# Patient Record
Sex: FEMALE | Race: BLACK OR AFRICAN AMERICAN | NOT HISPANIC OR LATINO | Employment: OTHER | URBAN - METROPOLITAN AREA
[De-identification: names, ages, dates, MRNs, and addresses within clinical notes are randomized per-mention and may not be internally consistent; named-entity substitution may affect disease eponyms.]

---

## 2017-08-10 ENCOUNTER — GENERIC CONVERSION - ENCOUNTER (OUTPATIENT)
Dept: OBGYN CLINIC | Facility: CLINIC | Age: 62
End: 2017-08-10

## 2017-09-12 LAB
25(OH)D3 SERPL-MCNC: 52 NG/ML (ref 30–100)
BUN SERPL-MCNC: 12 MG/DL (ref 7–25)
CHOLEST SERPL-MCNC: 241 MG/DL (ref 0–199)
CREAT SERPL-MCNC: 0.99 MG/DL (ref 0.6–1.2)
EGFR (HISTORICAL): 73 ML/MIN
EST. AVERAGE GLUCOSE BLD GHB EST-MCNC: 120 MG/DL
FOLATE SERPL-MCNC: >24.8 NG/ML (ref 5.9–24.8)
GLUCOSE (HISTORICAL): 93 MG/DL (ref 70–100)
HBA1C MFR BLD HPLC: 5.8 % (ref 4.6–6.2)
HDLC SERPL-MCNC: 69 MG/DL (ref 40–60)
Lab: 158 MG/DL (ref 0–100)
NON-HDL-CHOL (CHOL-HDL) (HISTORICAL): 172 MG/DL (ref 0–129)
T4 FREE SERPL-MCNC: 0.74 NG/DL (ref 0.61–1.12)
TRIGL SERPL-MCNC: 71 MG/DL (ref 0–149)
TSH SERPL DL<=0.05 MIU/L-ACNC: 1.96 UIU/ML (ref 0.34–5.6)

## 2017-11-28 ENCOUNTER — ALLSCRIPTS OFFICE VISIT (OUTPATIENT)
Dept: OTHER | Facility: OTHER | Age: 62
End: 2017-11-28

## 2017-11-28 DIAGNOSIS — R10.9 ABDOMINAL PAIN: ICD-10-CM

## 2017-11-29 NOTE — PROGRESS NOTES
Assessment    1  Female pelvic pain (625 9) (R10 2)    Plan  Abdominal pain    · 1 - Faizan COLLIER, Radha Vazquez (Gastroenterology) Co-Management  *  Status: Active -Retrospective By Protocol Authorization  Requested for: [id number]   Ordered; Abdominal pain; Ordered By: David Ortega Performed:  Due: [id number]; Last Updated By: Adolfo Medrano; 11/28/2017 10:16:59 AM  Care Summary provided  : Yes   · * US PELVIS COMPLETE (TRANSABDOMINAL AND TRANSVAGINAL); Status:Hold For -Scheduling,Retrospective By Protocol Authorization; Requested for:28Nov2017;    Perform:[facility]; [phone]; Last Updated Mihiria Gilberto; 11/28/2017 10:17:19 AM;Ordered;pain; Tucker Passe By:Kayla Rice; Advised to see Gastroenterology if pelvic ultrasound is normal to r/o diverticular issues, pt agrees   Discussion/Summary  Goals and Barriers: The patient has the current Goals: Resolve LLQ pain  The patent has the current Barriers: Losing insurance 1/2018  Patient's Capacity to Self-Care: Patient is able to Self-Care  Medication SE Review and Pt Understands Tx: Possible side effects of new medications were reviewed with the patient/guardian today  The treatment plan was reviewed with the patient/guardian  The patient/guardian understands and agrees with the treatment plan   Self Referrals:   Self Referrals: Yes      Chief Complaint  Chief Complaint Free Text Note Form: Patient here due to pain near the left side of her vaginal area that leads to her left pelvic area sometimes  Patient states had this pain x few months  History of Present Illness  HPI: Chelsea 58year-old here for evaluation of left lower quadrant pain comes and goes  She states she had a colonoscopy 3 years ago which was normal  She denies vaginal issues  She does have history of partial hysterectomy for fibroids  She denies abnormal Pap history and is up-to-date, last Pap 2017 normal  Reports blood-tinged with BMs recently        Review of Systems  Focused-Female:  Constitutional: No fever, no chills, feels well, no tiredness, no recent weight gain or loss  Gastrointestinal: abdominal pain-- and-- bloody stools, but-- no nausea,-- no vomiting,-- no constipation-- and-- no diarrhea  Genitourinary: no complaints of dysuria, no incontinence, no pelvic pain, no dysmenorrhea, no vaginal discharge or abnormal vaginal bleeding  ROS Reviewed:   ROS reviewed  Active Problems  1  Abdominal pain (789 00) (R10 9)    Past Medical History  1  History of ovarian cyst (V13 29) (Z87 42)  Active Problems And Past Medical History Reviewed: The active problems and past medical history were reviewed and updated today  Surgical History    1  History of Hysterectomy  Surgical History Reviewed: The surgical history was reviewed and updated today  Family History  Sister    1  Family history of malignant neoplasm of breast (V16 3) (Z80 3)  Multiple Family Members    2  Family history of diabetes mellitus (V18 0) (Z83 3)   3  Family history of hypertension (V17 49) (Z82 49)  Maternal Relatives    4  Family history of diabetes mellitus (V18 0) (Z83 3)   5  Family history of hypertension (V17 49) (Z82 49)  Family History Reviewed: The family history was reviewed and updated today  Denies fam hx of gyn or colon cancers  Alber First is still a little      Social History   · Never a smoker   · No alcohol use   · Not currently sexually active  Social History Reviewed: The social history was reviewed and updated today  Current Meds   1  No Reported Medications Recorded  Medication List Reviewed: The medication list was reviewed and updated today  Allergies  1   No Known Drug Allergies    Vitals  Vital Signs    Recorded: [id number] 09:52AM   Temperature 98 1 F, Oral   Systolic 590, LUE, Sitting   Diastolic 72, LUE, Sitting   Height 5 ft 1 in   Weight 152 lb    BMI Calculated 28 72   BSA Calculated 1 68       Physical Exam   Constitutional  General appearance: No acute distress, well appearing and well nourished  Pulmonary  Respiratory effort: No increased work of breathing or signs of respiratory distress  Genitourinary  External genitalia: Normal and no lesions appreciated  Vagina: Normal, no lesions or dryness appreciated  Urethral meatus: Normal    Cervix: Normal, no palpable masses  Uterus: Surgically absent  Adnexa/parametria: Normal, non-tender and no fullness or masses appreciated     Psychiatric  Orientation to person, place, and time: Normal    Mood and affect: Normal        Signatures   Electronically signed by : ANGELA Bangura; Nov 28 2017 10:24AM EST                       (Author)    Electronically signed by : Lashay Copeland MD; Nov 28 2017 12:59PM EST

## 2017-11-30 ENCOUNTER — HOSPITAL ENCOUNTER (OUTPATIENT)
Dept: ULTRASOUND IMAGING | Facility: CLINIC | Age: 62
Discharge: HOME | End: 2017-11-30
Payer: COMMERCIAL

## 2017-11-30 DIAGNOSIS — R10.9 ABDOMINAL PAIN: ICD-10-CM

## 2017-11-30 PROCEDURE — 76830 TRANSVAGINAL US NON-OB: CPT

## 2017-11-30 PROCEDURE — 76856 US EXAM PELVIC COMPLETE: CPT

## 2017-12-04 ENCOUNTER — GENERIC CONVERSION - ENCOUNTER (OUTPATIENT)
Dept: OTHER | Facility: OTHER | Age: 62
End: 2017-12-04

## 2017-12-12 ENCOUNTER — ALLSCRIPTS OFFICE VISIT (OUTPATIENT)
Dept: OTHER | Facility: OTHER | Age: 62
End: 2017-12-12

## 2017-12-13 ENCOUNTER — TRANSCRIBE ORDERS (OUTPATIENT)
Dept: ADMINISTRATIVE | Facility: HOSPITAL | Age: 62
End: 2017-12-13

## 2017-12-13 DIAGNOSIS — R10.9 STOMACH ACHE: Primary | ICD-10-CM

## 2017-12-13 NOTE — CONSULTS
Assessment    1  Abdominal pain (789 00) (R10 9)    Plan  Abdominal pain    · Dicyclomine HCl - 20 MG Oral Tablet; TAKE 1 TABLET EVERY 6 HOURS ASNEEDED   Rx By: Erik Hayden; Dispense: 10 Days ; #:40 Tablet; Refill: 0;Abdominal pain; SHARLENE = N; [facility] [address]   · * CT ABDOMEN PELVIS W CONTRAST; Status:Need Information - Financial Authorization; Requested for:[id number];    Perform:[facility]; UZP:[id number];[place]; Mark Todd; Ordered By:Starla Gloria; Discussion/Summary  Discussion Summary:   She 68-year-old female with left lower quadrant and left groin pain which I think is from an inguinal canal strain or from spasm  She had a normal colonoscopy 3 years ago  She is no change in her bowel habits pain  The pain is severe  we'll do CAT scan of abdomen and pelvis with contrast   will give dissecting trial     Counseling Documentation With Imm: The patient was counseled regarding diagnostic results,-- prognosis,-- risks and benefits of treatment options  History of Present Illness  HPI: She is a 68-year-old female with left lower quadrant and left groin abdominal pain that radiates down the leg a little bit  She reports bowel habits that are normal she has no melena hematochezia  She had a normal colonoscopy 3 years ago  She is no fevers chills nausea vomiting  It does not appear to be movement or musculoskeletal related  She does transvaginal ultrasound and a gynecological exam that was normal  She denies NSAID use  She is no medication trials  Review of Systems  Complete-Female GI Adult:  Constitutional: No fever, no chills, feels well, no tiredness, no recent weight gain or weight loss  Eyes: No complaints of eye pain, no red eyes, no eyesight problems, no discharge, no dry eyes, no itching of eyes  ENT: no complaints of earache, no loss of hearing, no nose bleeds, no nasal discharge, no sore throat, no hoarseness    Cardiovascular: No complaints of slow heart rate, no fast heart rate, no chest pain, no palpitations, no leg claudication, no lower extremity edema  Respiratory: No complaints of shortness of breath, no wheezing, no cough, no SOB on exertion, no orthopnea, no PND  Gastrointestinal: No complaints of abdominal pain, no constipation, no nausea or vomiting, no diarrhea, no bloody stools  Genitourinary: No complaints of dysuria, no incontinence, no pelvic pain, no dysmenorrhea, no vaginal discharge or bleeding  Musculoskeletal: No complaints of arthralgias, no myalgias, no joint swelling or stiffness, no limb pain or swelling  Integumentary: No complaints of skin rash or lesions, no itching, no skin wounds, no breast pain or lump  Neurological: No complaints of headache, no confusion, no convulsions, no numbness, no dizziness or fainting, no tingling, no limb weakness, no difficulty walking  Psychiatric: Not suicidal, no sleep disturbance, no anxiety or depression, no change in personality, no emotional problems  Endocrine: No complaints of proptosis, no hot flashes, no muscle weakness, no deepening of the voice, no feelings of weakness  Hematologic/Lymphatic: No complaints of swollen glands, no swollen glands in the neck, does not bleed easily, does not bruise easily  ROS Reviewed:   ROS reviewed  Active Problems  1  Abdominal pain (789 00) (R10 9)   2  Female pelvic pain (625 9) (R10 2)    Past Medical History  1  History of ovarian cyst (V13 29) (Z87 42)  Active Problems And Past Medical History Reviewed: The active problems and past medical history were reviewed and updated today  Surgical History  1  History of Hysterectomy  Surgical History Reviewed: The surgical history was reviewed and updated today  Family History  Sister    1  Family history of malignant neoplasm of breast (V16 3) (Z80 3)  Multiple Family Members    2  Family history of diabetes mellitus (V18 0) (Z83 3)   3   Family history of hypertension (V17 49) (Z82 49)  Maternal Relatives    4  Family history of diabetes mellitus (V18 0) (Z83 3)   5  Family history of hypertension (V17 49) (Z82 49)  Family History Reviewed: The family history was reviewed and updated today  Social History   · Never a smoker   · No alcohol use   · Not currently sexually active  Social History Reviewed: The social history was reviewed and updated today  The social history was reviewed and is unchanged  Current Meds   1  No Reported Medications Recorded  Medication List Reviewed: The medication list was reviewed and updated today  Allergies  1  No Known Drug Allergies    Vitals  Vital Signs    Recorded: 85Oak4939 03:09PM   Heart Rate 90   Systolic 685   Diastolic 84   Height 5 ft 1 in   Weight 155 lb 4 oz   BMI Calculated 29 33   BSA Calculated 1 7       Physical Exam   Constitutional  General appearance: No acute distress, well appearing and well nourished  Eyes  Conjunctiva and lids: No swelling, erythema or discharge  Pupils and irises: Equal, round and reactive to light  Ears, Nose, Mouth, and Throat  External inspection of ears and nose: Normal    Otoscopic examination: Tympanic membranes translucent with normal light reflex  Canals patent without erythema  Nasal mucosa, septum, and turbinates: Normal without edema or erythema  Oropharynx: Normal with no erythema, edema, exudate or lesions  Pulmonary  Respiratory effort: No increased work of breathing or signs of respiratory distress  Auscultation of lungs: Clear to auscultation  Cardiovascular  Palpation of heart: Normal PMI, no thrills  Auscultation of heart: Normal rate and rhythm, normal S1 and S2, without murmurs  Examination of extremities for edema and/or varicosities: Normal    Carotid pulses: Normal    Abdomen  Abdomen: Abnormal  -- tender to palp in LLQ  Liver and spleen: No hepatomegaly or splenomegaly  Lymphatic  Palpation of lymph nodes in neck: No lymphadenopathy     Musculoskeletal  Gait and station: Normal    Digits and nails: Normal without clubbing or cyanosis  Inspection/palpation of joints, bones, and muscles: Normal    Skin  Skin and subcutaneous tissue: Normal without rashes or lesions     Psychiatric  Orientation to person, place, and time: Normal    Mood and affect: Normal          Signatures   Electronically signed by : Yeni Mccallum MD; Dec 12 2017  3:43PM EST                       (Author)

## 2017-12-15 ENCOUNTER — HOSPITAL ENCOUNTER (OUTPATIENT)
Dept: CT IMAGING | Facility: CLINIC | Age: 62
Discharge: HOME | End: 2017-12-15
Payer: COMMERCIAL

## 2017-12-15 ENCOUNTER — GENERIC CONVERSION - ENCOUNTER (OUTPATIENT)
Dept: OTHER | Facility: OTHER | Age: 62
End: 2017-12-15

## 2017-12-15 DIAGNOSIS — R10.9 ABDOMINAL PAIN: ICD-10-CM

## 2017-12-15 PROCEDURE — 74177 CT ABD & PELVIS W/CONTRAST: CPT

## 2017-12-15 RX ADMIN — IOHEXOL 100 ML: 350 INJECTION, SOLUTION INTRAVENOUS at 13:47

## 2018-01-13 VITALS
SYSTOLIC BLOOD PRESSURE: 128 MMHG | HEIGHT: 61 IN | BODY MASS INDEX: 28.7 KG/M2 | WEIGHT: 152 LBS | TEMPERATURE: 98.1 F | DIASTOLIC BLOOD PRESSURE: 72 MMHG

## 2018-01-23 VITALS
HEART RATE: 90 BPM | DIASTOLIC BLOOD PRESSURE: 84 MMHG | SYSTOLIC BLOOD PRESSURE: 122 MMHG | HEIGHT: 61 IN | BODY MASS INDEX: 29.31 KG/M2 | WEIGHT: 155.25 LBS

## 2018-01-23 NOTE — MISCELLANEOUS
Message   Recorded as Task   Date: 2017 07:15 AM, Created By: Charle Hamman   Task Name: Follow Up   Assigned To: Jami Apgar   Regarding Patient: Hardy Sears, Status: In Progress   Comment:    Kayla Rice - 04 Dec 2017 7:15 AM     TASK CREATED  pls notify pt her pelvic u/s showed nml ovaries so I would like her to see GI as discussed at our last visit,thx   Madison Low - 04 Dec 2017 9:28 AM     TASK IN PROGRESS   Madison Low - 04 Dec 2017 9:31 AM     TASK EDITED  Patient is aware of results  She has already made an appointment with a GI doctor  Active Problems    1  Abdominal pain (789 00) (R10 9)   2  Female pelvic pain (625 9) (R10 2)    Current Meds   1  No Reported Medications Recorded    Allergies    1   No Known Drug Allergies    Signatures   Electronically signed by : Gilford Jourdain, ; Dec  4 2017  9:31AM EST                       (Author)

## 2018-01-23 NOTE — RESULT NOTES
Verified Results  * [facility] [id number] 09:37AM Deleta Tamara Order Number: [id number]    - Patient Instructions: To schedule this appointment, please contact Central Scheduling at [phone]  Test Name Result Flag Reference   US PELVIS COMPLETE (TRANSABDOMINAL AND TRANSVAGINAL) (Report)     PELVIC ULTRASOUND, COMPLETE     INDICATION: Status post hysterectomy  Left lower quadrant pain radiating down leg  COMPARISON: None  TECHNIQUE:  Transabdominal pelvic ultrasound was performed in sagittal and transverse planes with a curvilinear transducer  Additional transvaginal imaging was performed to better evaluate the endometrium and ovaries  Imaging included volumetric    sweeps as well as traditional still imaging technique  FINDINGS:     UTERUS:   Status post hysterectomy  ENDOMETRIUM:    Status post hysterectomy  OVARIES/ADNEXA:   Right ovary: 1 6 x 2 1 x 1 4 cm  No suspicious right ovarian abnormality  Doppler flow within normal limits  Left ovary: 2 1 x 1 0 x 1 4 cm  No suspicious left ovarian abnormality  Doppler flow within normal limits  No suspicious adnexal mass or loculated collections  There is no free fluid  IMPRESSION:        1  Status post hysterectomy  2  Normal ovaries            Workstation performed: [id number]     Signed by:   Kina Sears MD   12/1/17

## 2018-01-23 NOTE — RESULT NOTES
Verified Results  * CT ABDOMEN PELVIS W CONTRAST [id number] 01:12PM Romina Buchanancorrine Order Number: [id number]    - Patient Instructions: To schedule this appointment, please contact Central Scheduling at [phone]  Test Name Result Flag Reference   CT ABDOMEN PELVIS W CONTRAST (Report)     CT ABDOMEN AND PELVIS WITH IV CONTRAST     INDICATION: Left lower abdominal pain and groin pain  Left-sided vaginal pain  COMPARISON: None  TECHNIQUE: CT examination of the abdomen and pelvis was performed  Reformatted images were created in axial, sagittal, and coronal planes  Radiation dose length product (DLP) for this visit: 661 8 mGy-cm   This examination, like all CT scans performed in the [facility], was performed utilizing techniques to minimize radiation dose exposure, including the use of iterative    reconstruction and automated exposure control  IV Contrast: 100 mL of iohexol (OMNIPAQUE)        Enteric Contrast: Enteric contrast was not administered  FINDINGS:     ABDOMEN     LOWER CHEST: No significant abnormalities identified in the lower chest      LIVER/BILIARY TREE: Unremarkable  GALLBLADDER: No calcified gallstones  No pericholecystic inflammatory change  SPLEEN: Unremarkable  PANCREAS: Unremarkable  ADRENAL GLANDS: Unremarkable  KIDNEYS/URETERS: Unremarkable  No hydronephrosis  STOMACH AND BOWEL: Unremarkable  APPENDIX: No findings to suggest appendicitis  ABDOMINOPELVIC CAVITY: Trace simple pelvic free fluid identified  VESSELS: Unremarkable for patient's age  PELVIS     REPRODUCTIVE ORGANS: Unremarkable for patient's age  URINARY BLADDER: Unremarkable  ABDOMINAL WALL/INGUINAL REGIONS: Unremarkable  OSSEOUS STRUCTURES: No acute fracture or destructive osseous lesion  IMPRESSION:     No acute intra-abdominal abnormality   No free air, free fluid, mesenteric inflammatory process, or bowel wall thickening         Workstation performed: [id number]     Signed by:   Mariela Lee DO   12/15/17

## 2018-02-15 ENCOUNTER — OFFICE VISIT (OUTPATIENT)
Dept: INTERNAL MEDICINE CLINIC | Facility: CLINIC | Age: 63
End: 2018-02-15
Payer: COMMERCIAL

## 2018-02-15 VITALS
WEIGHT: 151 LBS | TEMPERATURE: 97.5 F | HEIGHT: 61 IN | HEART RATE: 99 BPM | OXYGEN SATURATION: 99 % | BODY MASS INDEX: 28.51 KG/M2 | DIASTOLIC BLOOD PRESSURE: 70 MMHG | SYSTOLIC BLOOD PRESSURE: 114 MMHG

## 2018-02-15 DIAGNOSIS — J20.8 VIRAL BRONCHITIS: Primary | ICD-10-CM

## 2018-02-15 DIAGNOSIS — Z71.82 EXERCISE COUNSELING: ICD-10-CM

## 2018-02-15 DIAGNOSIS — E66.3 OVERWEIGHT (BMI 25.0-29.9): Chronic | ICD-10-CM

## 2018-02-15 DIAGNOSIS — R73.03 PREDIABETES: Chronic | ICD-10-CM

## 2018-02-15 DIAGNOSIS — Z71.3 DIETARY COUNSELING AND SURVEILLANCE: ICD-10-CM

## 2018-02-15 DIAGNOSIS — E78.00 HYPERCHOLESTEROLEMIA: Chronic | ICD-10-CM

## 2018-02-15 PROBLEM — R87.810 CERVICAL HIGH RISK HPV (HUMAN PAPILLOMAVIRUS) TEST POSITIVE: Status: ACTIVE | Noted: 2017-08-10

## 2018-02-15 PROCEDURE — 99204 OFFICE O/P NEW MOD 45 MIN: CPT | Performed by: INTERNAL MEDICINE

## 2018-02-15 RX ORDER — PROMETHAZINE HYDROCHLORIDE AND CODEINE PHOSPHATE 6.25; 1 MG/5ML; MG/5ML
5 SYRUP ORAL EVERY 4 HOURS PRN
Qty: 473 ML | Refills: 1 | Status: SHIPPED | OUTPATIENT
Start: 2018-02-15 | End: 2018-09-13 | Stop reason: CLARIF

## 2018-02-15 NOTE — PROGRESS NOTES
INTERNAL MEDICINE INITIAL OFFICE VISIT  [facility] [address] [place] [address]    NAME: Jeffery Dorsey  AGE: 61 y o  SEX: female  : 1955     DATE: 2/15/2018    Assessment and Plan     1  Viral bronchitis    Patient's history and physical consistent with viral bronchitis  Discussed supportive treatment options  Cough medicine prescription was given to her today  Rest/fluids  Discussed signs/symptoms that would warrant re-evaluation  - promethazine-codeine (PHENERGAN WITH CODEINE) 6 25-10 mg/5 mL syrup; Take 5 mL by mouth every 4 (four) hours as needed for cough  Dispense: 473 mL; Refill: 1    2  Hypercholesterolemia    Discussed diet/exercise with patient  Would like to repeat labs in 4 months  If no improvement in her cholesterol, will likely recommend statin  - Lipid Panel with Direct LDL reflex; Future  - Basic metabolic panel; Future    3  Prediabetes    Discussed diet and exercise with patient  She was recommend to decrease cholesterol and carbohydrate intake  Will repeat A1C before next appointment  - Hemoglobin A1c; Future    4  Overweight (BMI 25 0-29  9)    Dietary and exercise counseling was given to the patient  BMI goal <25  She will work on making changes in her lifestyle  Barriers to treatment: no insurance    Chief Complaint     Chief Complaint   Patient presents with    Cough    Nasal Congestion    Establish Care     History of Present Illness     61 y o   female presents to establish care  She was last receiving [facility] at [facility]  Labs from 2017 were reviewed  Patient has a history of overweight, hypercholesterolemia, and pre-diabetes (A1C 5 8%)  She admits to excess carbohydrate intake and poor exercise  The reason she presents today is due to illness  She started getting sick on Saturday  Her symptoms overall are mild, but her cough is moderate she states   She has been experiencing harsh dry cough, nasal congestion, rhinorrhea, and feeling hot/cold  She has not taken her temperature at home  She is feeling a little better today, but she still has a nasty cough  She denies SOB, wheezing, tobacco use, or history of lung disease  She denies any history of heart disease  She denies any recent sick contacts  She denies difficulty lying flat  She is UTD with mammo, pap smear, and colonoscopy  She has colonoscopy report at home that she will bring in  She believes she had it in 2014  She does not get the flu shot  She is not depressed  The following portions of the patient's history were reviewed and updated as appropriate: allergies, current medications, past family history, past medical history, past social history, past surgical history and problem list     Review of Systems   Review of Systems   Constitutional: Negative for appetite change, chills, fatigue and fever  HENT: Positive for congestion and rhinorrhea  Negative for hearing loss, postnasal drip, sinus pain, sore throat, tinnitus and trouble swallowing  Eyes: Negative for pain, discharge, redness and visual disturbance  Respiratory: Positive for cough (Dry)  Negative for chest tightness, shortness of breath and wheezing  Cardiovascular: Negative for chest pain, palpitations and leg swelling  Gastrointestinal: Negative for abdominal distention, abdominal pain, blood in stool, constipation, diarrhea, nausea and vomiting  Endocrine: Negative for cold intolerance, heat intolerance, polydipsia, polyphagia and polyuria  Genitourinary: Negative for difficulty urinating, dysuria, frequency, hematuria and urgency  Musculoskeletal: Negative for arthralgias, back pain, gait problem, joint swelling, myalgias, neck pain and neck stiffness  Skin: Negative for color change and rash  Neurological: Negative for dizziness, tremors, seizures, syncope, speech difficulty, weakness, light-headedness, numbness and headaches     Hematological: Negative for adenopathy  Does not bruise/bleed easily  Psychiatric/Behavioral: Negative for agitation, behavioral problems, confusion, hallucinations, sleep disturbance and suicidal ideas  The patient is not nervous/anxious  Past Medical History     Past Medical History:   Diagnosis Date    Postmenopausal atrophic vaginitis     Uterine prolapse      Past Surgical History     Past Surgical History:   Procedure Laterality Date    COLONOSCOPY  2014    TOTAL ABDOMINAL HYSTERECTOMY      supracervical     Social History   She has no tobacco, alcohol, and drug history on file  Family History   No family history on file  Current Medications     Current Outpatient Prescriptions:     dicyclomine (BENTYL) 20 mg tablet, Take 20 mg by mouth every 6 (six) hours as needed, Disp: , Rfl: 0    Allergies   Allergies not on file    Objective   There were no vitals taken for this visit  Physical Exam   Constitutional: She is oriented to person, place, and time  She appears well-developed and well-nourished  No distress  HENT:   Head: Normocephalic and atraumatic  Nose: Mucosal edema and rhinorrhea present  Right sinus exhibits no maxillary sinus tenderness and no frontal sinus tenderness  Left sinus exhibits no maxillary sinus tenderness and no frontal sinus tenderness  Eyes: Conjunctivae and EOM are normal  Pupils are equal, round, and reactive to light  Right eye exhibits no discharge  Left eye exhibits no discharge  No scleral icterus  Neck: Normal range of motion  Neck supple  No JVD present  No tracheal deviation present  No thyromegaly present  Cardiovascular: Normal rate, regular rhythm, normal heart sounds and intact distal pulses  Exam reveals no gallop and no friction rub  No murmur heard  Pulmonary/Chest: Effort normal and breath sounds normal  No stridor  No respiratory distress  She has no wheezes  She has no rales  She exhibits no tenderness  Abdominal: Soft   Bowel sounds are normal  She exhibits no distension and no mass  There is no tenderness  There is no rebound and no guarding  Musculoskeletal: Normal range of motion  She exhibits no edema, tenderness or deformity  Lymphadenopathy:     She has no cervical adenopathy  Neurological: She is alert and oriented to person, place, and time  No cranial nerve deficit  She exhibits normal muscle tone  Coordination normal    Skin: Skin is warm and dry  No rash noted  She is not diaphoretic  No erythema  No pallor  Psychiatric: She has a normal mood and affect  Her behavior is normal    Vitals reviewed  Laboratory Results: I have personally reviewed the pertinent laboratory results/reports      Chemistry Profile:   Results from last 6 Months  Lab Units 09/12/17  1127   BUN mg/dL 12   CREATININE mg/dL 0 99     Endocrine Studies:   Results from last 6 Months  Lab Units 09/12/17  1127   HEMOGLOBIN A1C % 5 8   TSH 3RD GENERATON uIU/mL 1 955   FREE T4 ng/dL 0 74   TRIGLYCERIDES mg/dL 71   CHOLESTEROL mg/dL 241*   HDL mg/dL 69*   VIT D 25 HYDROXY ng/mL 52     Radiology/Other Diagnostic Testing Results: I have personally reviewed pertinent reports        Health Maintenance     Health Maintenance   Topic Date Due    HIV SCREENING  1955    Hepatitis C Screening  1955    COLONOSCOPY  1955    DTaP,Tdap,and Td Vaccines (1 - Tdap) 02/10/1962    Depression Screening PHQ-9  02/15/2019    MAMMOGRAM  08/10/2019    PAP SMEAR  07/25/2020    INFLUENZA VACCINE  Addressed (Patient declines/refuses)     Valentino Mcguire DO  [facility] [address] [place] [address]

## 2018-02-15 NOTE — PATIENT INSTRUCTIONS
Acute Bronchitis   AMBULATORY CARE:   Acute bronchitis  is swelling and irritation in the air passages of your lungs  This irritation may cause you to cough or have other breathing problems  Acute bronchitis often starts because of another illness, such as a cold or the flu  The illness spreads from your nose and throat to your windpipe and airways  Bronchitis is often called a chest cold  Acute bronchitis lasts about 3 to 6 weeks and is usually not a serious illness  Your cough can last for several weeks  You may have any of the following symptoms:   · A cough with sputum that may be clear, yellow, or green    · Feeling more tired than usual, and body aches    · A fever and chills    · Wheezing when you breathe    · A tight chest or pain when you breathe or cough  Seek care immediately if:   · You cough up blood  · Your lips or fingernails turn blue  · You feel like you are not getting enough air when you breathe  Contact your healthcare provider if:   · You have a fever  · Your breathing problems do not go away or get worse  · Your cough does not get better within 4 weeks  · You have questions or concerns about your condition or care  Self-care:   · Get more rest   Rest helps your body to heal  Slowly start to do more each day  Rest when you feel it is needed  · Avoid irritants in the air  Avoid chemicals, fumes, and dust  Wear a face mask if you must work around dust or fumes  Stay inside on days when air pollution levels are high  If you have allergies, stay inside when pollen counts are high  Do not use aerosol products, such as spray-on deodorant, bug spray, and hair spray  · Do not smoke or be around others who smoke  Nicotine and other chemicals in cigarettes and cigars damages the cilia that move mucus out of your lungs  Ask your healthcare provider for information if you currently smoke and need help to quit  E-cigarettes or smokeless tobacco still contain nicotine   Talk to your healthcare provider before you use these products  · Drink liquids as directed  Liquids help keep your air passages moist and help you cough up mucus  You may need to drink more liquids when you have acute bronchitis  Ask how much liquid to drink each day and which liquids are best for you  · Use a humidifier or vaporizer  Use a cool mist humidifier or a vaporizer to increase air moisture in your home  This may make it easier for you to breathe and help decrease your cough  Prevent acute bronchitis by doing the following:   · Get the vaccinations you need  Ask your healthcare provider if you should get vaccinated against the flu or pneumonia  · Prevent the spread of germs  You can decrease your risk of acute bronchitis and other illnesses by doing the following:     [facility] [place] hands often with soap and water  Carry germ-killing hand lotion or gel with you  You can use the lotion or gel to clean your hands when soap and water are not available  ¨ Do not touch your eyes, nose, or mouth unless you have washed your hands first     ¨ Always cover your mouth when you cough to prevent the spread of germs  It is best to cough into a tissue or your shirt sleeve instead of into your hand  Ask those around you cover their mouths when they cough  ¨ Try to avoid people who have a cold or the flu  If you are sick, stay away from others as much as possible  Medicines: Your healthcare provider may  give you any of the following:  · Ibuprofen or acetaminophen  are medicines that help lower your fever  They are available without a doctor's order  Ask your healthcare provider which medicine is right for you  Ask how much to take and how often to take it  Follow directions  These medicines can cause stomach bleeding if not taken correctly  Ibuprofen can cause kidney damage  Do not take ibuprofen if you have kidney disease, an ulcer, or allergies to aspirin  Acetaminophen can cause liver damage   Do not take more than 4,000 milligrams in 24 hours  · Decongestants  help loosen mucus in your lungs and make it easier to cough up  This can help you breathe easier  · Cough suppressants  decrease your urge to cough  If your cough produces mucus, do not take a cough suppressant unless your healthcare provider tells you to  Your healthcare provider may suggest that you take a cough suppressant at night so you can rest     · Inhalers  may be given  Your healthcare provider may give you one or more inhalers to help you breathe easier and cough less  An inhaler gives your medicine to open your airways  Ask your healthcare provider to show you how to use your inhaler correctly  Follow up with your healthcare provider as directed:  Write down questions you have so you will remember to ask them during your follow-up visits  © 2017 [employer] [employer]  Information is for End User's use only and may not be sold, redistributed or otherwise used for commercial purposes  All illustrations and images included in CareNotes® are the copyrighted property of [employer] M [employer]  or Clayton Kim  The above information is an  only  It is not intended as medical advice for individual conditions or treatments  Talk to your doctor, nurse or pharmacist before following any medical regimen to see if it is safe and effective for you  Hemoglobin A1c   WHAT YOU NEED TO KNOW:   What is a hemoglobin A1c test, and why do I need one? A hemoglobin A1c is a blood test that measures your average blood sugar level for the past 2 to 3 months  It is also called an HbA1c or glycohemoglobin test  An A1c test can help diagnose prediabetes or diabetes  It can also tell you how well your diabetes plan is working  A1c testing can help your healthcare provider make changes to your treatment plan  These changes can help improve or control your blood sugar levels   Good control of your blood sugar levels can decrease your risk for problems caused by diabetes  Examples include heart attack, stroke, blindness, kidney disease, and neuropathy (nerve problems)  What increases my risk for diabetes? You may need an A1c test if you have any of the following risk factors for diabetes:  · Heart disease    · High blood pressure    · Polycystic ovarian syndrome    · A first-degree relative with diabetes, such as a parent, brother, sister, or child    · Being overweight    · Lack of exercise or activity     · History of gestational diabetes and poor nutrition while pregnant  Who should get an A1c test?   · Adults who are overweight and have 1 or more risk factors for diabetes    · Adults 39years of age or older    · Children 7 to 25 years who are overweight and have 2 or more risk factors for diabetes    · Anyone with signs or symptoms of diabetes, such as increased thirst, slow-healing infections, or increased urination  What do the results of an A1c test mean? The results are given in percentages  · An A1c of 5 6% or lower means you do not have diabetes  · An A1c of 5 7% to 6 4% means you are at risk for diabetes  This is also called prediabetes  · An A1c of 6 5% or higher means you have diabetes  · If you currently have diabetes in good control, your A1c goal may be 7% or lower  Your healthcare provider will decide what your goal should be  This decision is based on your diabetes history and other medical conditions  You may need an A1c test 2 to 4 times each year, depending on your blood sugar level control  How should I prepare for the test?  You do not need to do anything to prepare for the test  Wear a short-sleeved or loose shirt to the test  This will make it easier to draw your blood  Other tests may be needed to diagnose or monitor diabetes if you have certain conditions   Tell your healthcare provider if you have any of the following:  · Iron-deficiency or [id number] anemia    · Cystic fibrosis    · Recent heavy blood loss or a blood transfusion    · Recent erythropoietin therapy    · Sickle cell disease or thalassemia    · Kidney failure or liver disease  What will happen after the test?  Schedule a follow-up appointment with your healthcare provider to talk about your test results  · If your A1c is lower than your goal , your medicines may be changed  · If your A1c is at your goal , you may not need any changes to your diabetes treatment plan  · If your A1c is higher than your goal , you may need changes to your medicines, eating plan, or exercise plan  What else should I know about an A1c test?   · You may get an estimated Average Glucose (eAG) with your A1c results  The eAG gives your A1c result in numbers like you see on your glucose meter  For example, an A1c of 6% will be reported as an eAG of 126 mg/dL  This means your average blood sugar level was 126 mg/dL over the last 2 to 3 months  The eAG can help you understand if your A1c result is on target for what your healthcare provider recommends  · You are at risk for an uncommon type of hemoglobin if you are , Mediterranean, or [facility]  This type of hemoglobin can affect your A1c test results  Tell your healthcare provider if you come from any of these backgrounds  You may need to have your A1c sent to a lab with certain equipment  This will help make sure your results are accurate  CARE AGREEMENT:   You have the right to help plan your care  To help with this plan, you must learn about your lab tests  You can then discuss the results with your caregivers  Work with them to decide what care may be used to treat you  You always have the right to refuse treatment  The above information is an  only  It is not intended as medical advice for individual conditions or treatments  Talk to your doctor, nurse or pharmacist before following any medical regimen to see if it is safe and effective for you    © 2017 [employer]  Information is for End User's use only and may not be sold, redistributed or otherwise used for commercial purposes  All illustrations and images included in CareNotes® are the copyrighted property of A D A M , Inc  or Clayton Kim

## 2018-02-26 ENCOUNTER — TELEPHONE (OUTPATIENT)
Dept: INTERNAL MEDICINE CLINIC | Facility: CLINIC | Age: 63
End: 2018-02-26

## 2018-02-26 DIAGNOSIS — R05.3 PERSISTENT COUGH: Primary | ICD-10-CM

## 2018-02-26 NOTE — TELEPHONE ENCOUNTER
Patient is request a chest xray, has a bad cough for weeks needs to get it done by Wednesday   Uvaldo Wynn  Please call when ready for

## 2018-03-01 ENCOUNTER — APPOINTMENT (OUTPATIENT)
Dept: RADIOLOGY | Facility: CLINIC | Age: 63
End: 2018-03-01
Payer: COMMERCIAL

## 2018-03-01 ENCOUNTER — TELEPHONE (OUTPATIENT)
Dept: INTERNAL MEDICINE CLINIC | Facility: CLINIC | Age: 63
End: 2018-03-01

## 2018-03-01 DIAGNOSIS — R05.3 PERSISTENT COUGH: ICD-10-CM

## 2018-03-01 PROCEDURE — 71046 X-RAY EXAM CHEST 2 VIEWS: CPT

## 2018-03-02 ENCOUNTER — TRANSCRIBE ORDERS (OUTPATIENT)
Dept: ADMINISTRATIVE | Facility: HOSPITAL | Age: 63
End: 2018-03-02

## 2018-03-02 ENCOUNTER — TELEPHONE (OUTPATIENT)
Dept: INTERNAL MEDICINE CLINIC | Facility: CLINIC | Age: 63
End: 2018-03-02

## 2018-03-02 DIAGNOSIS — Z12.39 SCREENING BREAST EXAMINATION: Primary | ICD-10-CM

## 2018-03-02 DIAGNOSIS — Z12.39 SCREENING FOR BREAST CANCER: Primary | ICD-10-CM

## 2018-03-02 NOTE — TELEPHONE ENCOUNTER
----- Message from Kenny Thomas DO sent at 3/1/2018  6:52 PM EST -----  Let patient know there was no evidence of pneumonia on chest x-ray

## 2018-03-05 ENCOUNTER — OFFICE VISIT (OUTPATIENT)
Dept: OBGYN CLINIC | Facility: CLINIC | Age: 63
End: 2018-03-05
Payer: COMMERCIAL

## 2018-03-05 VITALS
HEIGHT: 61 IN | SYSTOLIC BLOOD PRESSURE: 110 MMHG | WEIGHT: 153 LBS | BODY MASS INDEX: 28.89 KG/M2 | DIASTOLIC BLOOD PRESSURE: 62 MMHG

## 2018-03-05 DIAGNOSIS — R87.810 CERVICAL HIGH RISK HPV (HUMAN PAPILLOMAVIRUS) TEST POSITIVE: ICD-10-CM

## 2018-03-05 DIAGNOSIS — Z01.411 ENCOUNTER FOR GYNECOLOGICAL EXAMINATION (GENERAL) (ROUTINE) WITH ABNORMAL FINDINGS: Primary | ICD-10-CM

## 2018-03-05 PROCEDURE — G0124 SCREEN C/V THIN LAYER BY MD: HCPCS | Performed by: PATHOLOGY

## 2018-03-05 PROCEDURE — 99396 PREV VISIT EST AGE 40-64: CPT | Performed by: NURSE PRACTITIONER

## 2018-03-05 PROCEDURE — G0145 SCR C/V CYTO,THINLAYER,RESCR: HCPCS | Performed by: PATHOLOGY

## 2018-03-05 PROCEDURE — 87624 HPV HI-RISK TYP POOLED RSLT: CPT | Performed by: NURSE PRACTITIONER

## 2018-03-05 NOTE — PROGRESS NOTES
Assessment/Plan:    No problem-specific Assessment & Plan notes found for this encounter  Diagnoses and all orders for this visit:    Encounter for gynecological examination (general) (routine) with abnormal findings    Cervical high risk HPV (human papillomavirus) test positive        Calcium/vit d supplementation, call with any bleeding or spotting  Subjective:      Patient ID: Theodoro Fothergill is a 61 y o  female  Pleasant 61 y o  postmenopausal female here for annual exam  She denies postmenopausal bleeding  Hx of Supracervical Hysterectomy  Denies history of abnormal pap smears  Last pap nml but HPV + non 16/18  Denies vaginal issues  Denies any further pelvic pain-pelvic u/s 7/2017 showed nml ovaries  Denies menopausal/postmenopausal issues  Gynecologic Exam   Pertinent negatives include no chills, constipation, diarrhea, dysuria, fever or hematuria  The following portions of the patient's history were reviewed and updated as appropriate:   She  has a past medical history of Postmenopausal atrophic vaginitis and Uterine prolapse  She   Patient Active Problem List    Diagnosis Date Noted    Overweight (BMI 25 0-29 9) 02/15/2018    Hypercholesterolemia 02/15/2018    Prediabetes 02/15/2018    Cervical high risk HPV (human papillomavirus) test positive 08/10/2017     She  has a past surgical history that includes Colonoscopy (2014) and Partial hysterectomy  Her family history includes Breast cancer in her sister; No Known Problems in her father and mother  She  reports that she has never smoked  She has never used smokeless tobacco  She reports that she does not drink alcohol or use drugs  Current Outpatient Prescriptions   Medication Sig Dispense Refill    promethazine-codeine (PHENERGAN WITH CODEINE) 6 25-10 mg/5 mL syrup Take 5 mL by mouth every 4 (four) hours as needed for cough 473 mL 1     No current facility-administered medications for this visit        Current Outpatient Prescriptions on File Prior to Visit   Medication Sig    promethazine-codeine (PHENERGAN WITH CODEINE) 6 25-10 mg/5 mL syrup Take 5 mL by mouth every 4 (four) hours as needed for cough     No current facility-administered medications on file prior to visit  She has No Known Allergies       Review of Systems   Constitutional: Negative for chills, fatigue, fever and unexpected weight change  Respiratory: Negative for shortness of breath  Gastrointestinal: Negative for anal bleeding, blood in stool, constipation and diarrhea  Genitourinary: Negative for difficulty urinating, dysuria and hematuria  Objective:      /62 (BP Location: Right arm, Patient Position: Sitting)   Ht 5' 1" (1 549 m)   Wt 69 4 kg (153 lb)   Breastfeeding? No   BMI 28 91 kg/m²          Physical Exam   Constitutional: She appears well-developed and well-nourished  No distress  HENT:   Head: Normocephalic  Neck: Normal range of motion  Neck supple  Pulmonary/Chest: Effort normal  Right breast exhibits no inverted nipple, no mass, no nipple discharge, no skin change and no tenderness  Left breast exhibits no inverted nipple, no mass, no nipple discharge, no skin change and no tenderness  Breasts are symmetrical    Abdominal: Soft  Genitourinary: No breast swelling, tenderness, discharge or bleeding  Pelvic exam was performed with patient supine  No labial fusion  There is no rash, tenderness, lesion or injury on the right labia  There is no rash, tenderness, lesion or injury on the left labia  No erythema or tenderness in the vagina  No foreign body in the vagina  No signs of injury around the vagina  No vaginal discharge found  Lymphadenopathy:        Right: No inguinal adenopathy present  Left: No inguinal adenopathy present

## 2018-03-05 NOTE — PATIENT INSTRUCTIONS
Menopause   WHAT YOU NEED TO KNOW:   Menopause is a normal stage in a woman's life when her monthly periods stop  Menopause starts when the ovaries slowly stop making the female hormones estrogen and progesterone  A woman who has not had a period for a full year after the age of 39 is considered to be in menopause  Perimenopause is a stage before menopause that may cause signs and symptoms similar to menopause  Perimenopause can last an average of 4 to 5 years  DISCHARGE INSTRUCTIONS:   Follow up with your healthcare provider as directed:  Write down your questions so you remember to ask them during your visits  Self-care:   · Manage hot flashes  Hot flashes are brief periods of feeling very warm, flushed, and sweaty  Hot flashes can last from a few seconds to several minutes  They may happen many times during the day, and are common at night  Layer your clothing so that you can easily remove some clothing and cool yourself during a hot flash  Cold drinks may also be helpful  · Reduce vaginal dryness  by using over-the-counter vaginal creams  Vaginal dryness may cause you to have pain or discomfort during sex  Only use creams that are made for vaginal use  Do  not  use petroleum jelly  You may need an estrogen cream to put in and around your vagina  Estrogen cream may help decrease vaginal dryness and lower your risk of vaginal infections  · Continue to use birth control  during perimenopause if you do not want to get pregnant  You may need to use birth control until it has been 1 year since your periods stopped  Ask your healthcare provider when you can stop using birth control to prevent pregnancy  Lead a healthy lifestyle:  After menopause, your risk for heart disease and bone loss increases  Ask about these and other ways to stay healthy:  · Exercise regularly  Exercise helps you maintain a healthy weight  Exercise can also help to control your blood pressure and cholesterol levels   Include weight-bearing exercise for strong bones  Ask your healthcare provider about the best exercise plan for you  · Eat a variety of healthy foods  Include fruits, vegetables, whole grains (whole-wheat bread, pasta, and cereals), low-fat dairy, and lean protein foods (beans, poultry, and fish)  Limit foods high in sodium (salt)  Ask your healthcare provider for more information about a meal plan that is right for you  · Maintain a healthy weight  Check with your healthcare provider before you start any weight loss program      · Take supplements as directed  You may need extra calcium and vitamin D to help prevent osteoporosis  · Limit alcohol and caffeine  Alcohol and caffeine may worsen your symptoms  · Do not smoke  If you smoke, it is never too late to quit  You are more likely to have a heart attack, lung disease, blood clots, and cancer if you smoke  Ask your healthcare provider for information if you need help quitting  Contact your healthcare provider if:   · You have vaginal bleeding after menopause  · You have questions or concerns about your condition or care  © 2017 [employer] [employer] Information is for End User's use only and may not be sold, redistributed or otherwise used for commercial purposes  All illustrations and images included in CareNotes® are the copyrighted property of [employer]  or [employer] Kim  The above information is an  only  It is not intended as medical advice for individual conditions or treatments  Talk to your doctor, nurse or pharmacist before following any medical regimen to see if it is safe and effective for you

## 2018-03-07 LAB — HPV RRNA GENITAL QL NAA+PROBE: ABNORMAL

## 2018-03-08 ENCOUNTER — HOSPITAL ENCOUNTER (OUTPATIENT)
Dept: MAMMOGRAPHY | Facility: CLINIC | Age: 63
Discharge: HOME | End: 2018-03-08
Payer: COMMERCIAL

## 2018-03-08 DIAGNOSIS — Z12.39 SCREENING FOR BREAST CANCER: ICD-10-CM

## 2018-03-08 DIAGNOSIS — Z12.39 SCREENING BREAST EXAMINATION: ICD-10-CM

## 2018-03-08 PROCEDURE — 77067 SCR MAMMO BI INCL CAD: CPT

## 2018-03-08 PROCEDURE — 77063 BREAST TOMOSYNTHESIS BI: CPT

## 2018-03-13 LAB
LAB AP GYN PRIMARY INTERPRETATION: NORMAL
Lab: NORMAL
PATH INTERP SPEC-IMP: NORMAL

## 2018-03-14 ENCOUNTER — OFFICE VISIT (OUTPATIENT)
Dept: INTERNAL MEDICINE CLINIC | Facility: CLINIC | Age: 63
End: 2018-03-14
Payer: COMMERCIAL

## 2018-03-14 ENCOUNTER — TELEPHONE (OUTPATIENT)
Dept: OBGYN CLINIC | Facility: CLINIC | Age: 63
End: 2018-03-14

## 2018-03-14 VITALS
HEART RATE: 100 BPM | HEIGHT: 63 IN | OXYGEN SATURATION: 98 % | SYSTOLIC BLOOD PRESSURE: 126 MMHG | BODY MASS INDEX: 27 KG/M2 | WEIGHT: 152.4 LBS | DIASTOLIC BLOOD PRESSURE: 86 MMHG

## 2018-03-14 DIAGNOSIS — R25.2 MUSCLE CRAMPS: ICD-10-CM

## 2018-03-14 DIAGNOSIS — M25.50 ARTHRALGIA, UNSPECIFIED JOINT: ICD-10-CM

## 2018-03-14 DIAGNOSIS — E78.00 HYPERCHOLESTEROLEMIA: Primary | Chronic | ICD-10-CM

## 2018-03-14 DIAGNOSIS — R73.03 PREDIABETES: Chronic | ICD-10-CM

## 2018-03-14 DIAGNOSIS — Z71.3 DIETARY COUNSELING AND SURVEILLANCE: ICD-10-CM

## 2018-03-14 PROCEDURE — 99214 OFFICE O/P EST MOD 30 MIN: CPT | Performed by: INTERNAL MEDICINE

## 2018-03-14 NOTE — PATIENT INSTRUCTIONS
1  Get lab testing as requested  Will call with results  2  Drink 6-8 glasses of water per day  3   OTC magnesium supplementation  4   Daily exercise

## 2018-03-14 NOTE — TELEPHONE ENCOUNTER
----- Message from Soha Wilburn, [address] sent at 3/14/2018  7:00 AM EDT -----  Please arrange for colposcopy, pap was ascus HPV +  Thanks

## 2018-03-14 NOTE — PROGRESS NOTES
INTERNAL MEDICINE FOLLOW-UP OFFICE VISIT  [facility] [address] [place] [address]    NAME: Miguel Antonio  AGE: 61 y o  SEX: female  : 1955       DATE: 3/14/2018    Assessment and Plan     Assessment  1  Hypercholesterolemia  2  Prediabetes  3  Muscle cramps  4  Arthralgia, unspecified joint    Plan    Get lab testing as requested  Discussed diet and exercise  Discussed daily stretching  Discussed medications that are beneficial with arthritis pain  Drink plenty of water per day  Stay away from excess carbohydrates and trans fat  Increase intake of lean protein  Exercise at least 150 minutes/week  Fruits/vegetables advised  Will call with lab results    Orders Placed This Encounter   Procedures    Lipid Panel with Direct LDL reflex    HEMOGLOBIN A1C W/ EAG ESTIMATION    Magnesium    CK (with reflex to MB)    Comprehensive metabolic panel    Sedimentation rate, automated     Chief Complaint     Chief Complaint   Patient presents with    Follow-up     questions/concerns     History of Present Illness     61 y o   female presents for follow-up  Labs from 2017 were reviewed  Patient has a history of overweight, hypercholesterolemia, and pre-diabetes (A1C 5 8%)  She is going to be losing her insurance  Wanted to know if she could get labs done today  She also has been having a lot of cramping in her arms and legs  She is unemployed  She gets aches and pains in hands, elbows, and knees  No fam history of autoimmune disease  No joint swelling  No fever or chills  The following portions of the patient's history were reviewed and updated as appropriate: allergies, current medications, past family history, past medical history, past social history, past surgical history and problem list     Review of Systems     Review of Systems   Constitutional: Negative for activity change, appetite change and fatigue     Respiratory: Negative for apnea, cough, chest tightness, shortness of breath and wheezing  Cardiovascular: Negative for chest pain, palpitations and leg swelling  Gastrointestinal: Negative for abdominal distention, abdominal pain, blood in stool, constipation, diarrhea, nausea and vomiting  Musculoskeletal: Positive for arthralgias and myalgias  Negative for back pain, gait problem and joint swelling  Skin: Negative for rash and wound  Psychiatric/Behavioral: Negative for sleep disturbance and suicidal ideas  Active Problem List     Patient Active Problem List   Diagnosis    Cervical high risk HPV (human papillomavirus) test positive    Overweight (BMI 25 0-29  9)    Hypercholesterolemia    Prediabetes     Objective     /86 (BP Location: Left arm, Patient Position: Sitting, Cuff Size: Standard)   Pulse 100   Ht 5' 3" (1 6 m)   Wt 69 1 kg (152 lb 6 4 oz) Comment: w/shoe  SpO2 98%   BMI 27 00 kg/m²     Physical Exam  GENERAL: Appears well-developed and well-nourished  Appears in no acute distress   CARDIOVASCULAR: S1 and S2 are present  Regular rate and rhythm  No murmurs, rubs, or gallops  RESPIRATORY: CTA B/L, no rales, rhonci or wheezes  Normal respiratory expansion  ABDOMINAL: Bowel sounds present in all 4 quadrants, non-tender, soft, non-distended  No organomegaly, rebound, or guarding  EXTREMITIES: 2+ DP and PT pulses bilaterally; no cyanosis, clubbing, edema  ROM intact  QUEVEDO x4   MUSCULOSKELETAL: No joint tenderness, deformity or swelling, full range of motion without pain     PSYCHIATRIC: Normal mood and affect     Pertinent Laboratory/Diagnostic Studies:    Laboratory Results: I have personally reviewed the pertinent laboratory results/reports     Current Medications       Current Outpatient Prescriptions:     promethazine-codeine (PHENERGAN WITH CODEINE) 6 25-10 mg/5 mL syrup, Take 5 mL by mouth every 4 (four) hours as needed for cough, Disp: 473 mL, Rfl: 1    Health Maintenance     Health Maintenance   Topic Date Due    HIV SCREENING  1955    Hepatitis C Screening  1955    COLONOSCOPY  1955    DTaP,Tdap,and Td Vaccines (1 - Tdap) 02/10/1976    Depression Screening PHQ-9  02/15/2019    MAMMOGRAM  08/10/2019    PAP SMEAR  03/05/2021    INFLUENZA VACCINE  Addressed       There is no immunization history on file for this patient      Avel Temple DO  [facility]

## 2018-03-16 ENCOUNTER — APPOINTMENT (OUTPATIENT)
Dept: LAB | Facility: CLINIC | Age: 63
End: 2018-03-16
Payer: COMMERCIAL

## 2018-03-16 DIAGNOSIS — R25.2 MUSCLE CRAMPS: ICD-10-CM

## 2018-03-16 DIAGNOSIS — M25.50 ARTHRALGIA, UNSPECIFIED JOINT: ICD-10-CM

## 2018-03-16 DIAGNOSIS — R73.03 PREDIABETES: ICD-10-CM

## 2018-03-16 DIAGNOSIS — E78.00 HYPERCHOLESTEROLEMIA: ICD-10-CM

## 2018-03-16 LAB
ALBUMIN SERPL BCP-MCNC: 3.5 G/DL (ref 3.5–5)
ALP SERPL-CCNC: 85 U/L (ref 46–116)
ALT SERPL W P-5'-P-CCNC: 16 U/L (ref 12–78)
ANION GAP SERPL CALCULATED.3IONS-SCNC: 8 MMOL/L (ref 4–13)
AST SERPL W P-5'-P-CCNC: 15 U/L (ref 5–45)
BILIRUB SERPL-MCNC: 0.2 MG/DL (ref 0.2–1)
BUN SERPL-MCNC: 28 MG/DL (ref 5–25)
CALCIUM SERPL-MCNC: 8.8 MG/DL (ref 8.3–10.1)
CHLORIDE SERPL-SCNC: 107 MMOL/L (ref 100–108)
CHOLEST SERPL-MCNC: 228 MG/DL (ref 50–200)
CK MB SERPL-MCNC: 1.5 NG/ML (ref 0–5)
CK MB SERPL-MCNC: <1 % (ref 0–2.5)
CK SERPL-CCNC: 190 U/L (ref 26–192)
CO2 SERPL-SCNC: 25 MMOL/L (ref 21–32)
CREAT SERPL-MCNC: 1.25 MG/DL (ref 0.6–1.3)
ERYTHROCYTE [SEDIMENTATION RATE] IN BLOOD: 17 MM/HOUR (ref 0–20)
EST. AVERAGE GLUCOSE BLD GHB EST-MCNC: 128 MG/DL
GFR SERPL CREATININE-BSD FRML MDRD: 53 ML/MIN/1.73SQ M
GLUCOSE P FAST SERPL-MCNC: 94 MG/DL (ref 65–99)
HBA1C MFR BLD: 6.1 % (ref 4.2–6.3)
HDLC SERPL-MCNC: 64 MG/DL (ref 40–60)
LDLC SERPL CALC-MCNC: 147 MG/DL (ref 0–100)
MAGNESIUM SERPL-MCNC: 2.3 MG/DL (ref 1.6–2.6)
POTASSIUM SERPL-SCNC: 4.1 MMOL/L (ref 3.5–5.3)
PROT SERPL-MCNC: 7.3 G/DL (ref 6.4–8.2)
SODIUM SERPL-SCNC: 140 MMOL/L (ref 136–145)
TRIGL SERPL-MCNC: 84 MG/DL

## 2018-03-16 PROCEDURE — 82553 CREATINE MB FRACTION: CPT

## 2018-03-16 PROCEDURE — 83735 ASSAY OF MAGNESIUM: CPT

## 2018-03-16 PROCEDURE — 80061 LIPID PANEL: CPT

## 2018-03-16 PROCEDURE — 36415 COLL VENOUS BLD VENIPUNCTURE: CPT

## 2018-03-16 PROCEDURE — 85652 RBC SED RATE AUTOMATED: CPT

## 2018-03-16 PROCEDURE — 83036 HEMOGLOBIN GLYCOSYLATED A1C: CPT

## 2018-03-16 PROCEDURE — 80053 COMPREHEN METABOLIC PANEL: CPT

## 2018-03-16 PROCEDURE — 82550 ASSAY OF CK (CPK): CPT

## 2018-03-16 NOTE — TELEPHONE ENCOUNTER
Certified letter sent today to Pt's mailing address in Pt's EHR in lieu of several attempts to contact Pt by phone  Pt needs appointment for colposcopy due to Pt's recent Pap test being positive for ASCUS and HPV per ANGELA Rice

## 2018-03-19 ENCOUNTER — TELEPHONE (OUTPATIENT)
Dept: INTERNAL MEDICINE CLINIC | Facility: CLINIC | Age: 63
End: 2018-03-19

## 2018-03-19 NOTE — TELEPHONE ENCOUNTER
----- Message from Reza Yañez sent at 3/19/2018  5:30 PM EDT -----  Spoke with patient and gave the results to her mammo     ----- Message -----  From: Kenny Thomas DO  Sent: 3/19/2018   5:22 PM  To: [facility]    Let patient know mammogram was normal

## 2018-03-29 ENCOUNTER — DOCUMENTATION (OUTPATIENT)
Dept: OBGYN CLINIC | Facility: CLINIC | Age: 63
End: 2018-03-29

## 2018-09-13 ENCOUNTER — OFFICE VISIT (OUTPATIENT)
Dept: INTERNAL MEDICINE CLINIC | Facility: CLINIC | Age: 63
End: 2018-09-13
Payer: COMMERCIAL

## 2018-09-13 ENCOUNTER — TELEPHONE (OUTPATIENT)
Dept: PHYSICAL THERAPY | Facility: OTHER | Age: 63
End: 2018-09-13

## 2018-09-13 VITALS
HEIGHT: 63 IN | DIASTOLIC BLOOD PRESSURE: 76 MMHG | BODY MASS INDEX: 26.65 KG/M2 | WEIGHT: 150.4 LBS | SYSTOLIC BLOOD PRESSURE: 116 MMHG | HEART RATE: 92 BPM | OXYGEN SATURATION: 98 %

## 2018-09-13 DIAGNOSIS — M54.42 ACUTE LEFT-SIDED BACK PAIN WITH SCIATICA: Primary | ICD-10-CM

## 2018-09-13 DIAGNOSIS — Z13.31 DEPRESSION SCREENING NEGATIVE: ICD-10-CM

## 2018-09-13 PROCEDURE — 99213 OFFICE O/P EST LOW 20 MIN: CPT | Performed by: INTERNAL MEDICINE

## 2018-09-13 RX ORDER — METHYLPREDNISOLONE 4 MG/1
TABLET ORAL
Qty: 21 TABLET | Refills: 0 | Status: SHIPPED | OUTPATIENT
Start: 2018-09-13 | End: 2018-11-14 | Stop reason: ALTCHOICE

## 2018-09-13 RX ORDER — CYCLOBENZAPRINE HCL 5 MG
10 TABLET ORAL 3 TIMES DAILY PRN
Qty: 60 TABLET | Refills: 1 | Status: SHIPPED | OUTPATIENT
Start: 2018-09-13 | End: 2018-11-14 | Stop reason: ALTCHOICE

## 2018-09-13 NOTE — PROGRESS NOTES
INTERNAL MEDICINE ACUTE OFFICE VISIT  [facility] - [facility]    NAME: Pb Herrera  AGE: 61 y o  SEX: female  : 1955     DATE: 2018     Assessment and Plan:     1  Acute left-sided back pain with sciatica    Discussed conservative treatment options - will prescribe steroids, anti-inflammatories, and muscle relaxer  Discussed side effects  Apply heat prn  Exercises printed out for patient  Avoid heavy lifting  Activity modification  Referral given to comprehensive spine program     - Ambulatory Referral to Comprehensive Spine Program; Future  - Methylprednisolone 4 MG TBPK; Use as directed on package  Dispense: 21 tablet; Refill: 0  - cyclobenzaprine (FLEXERIL) 5 mg tablet; Take 2 tablets (10 mg total) by mouth 3 (three) times a day as needed for muscle spasms  Dispense: 60 tablet; Refill: 1  - diclofenac sodium (VOLTAREN) 50 mg EC tablet; Take 1 tablet (50 mg total) by mouth 2 (two) times a day  Dispense: 60 tablet; Refill: 1     Chief Complaint:     Chief Complaint   Patient presents with    Follow-up     RD3 referred pt up to us; (L) side pain going down foot      History of Present Illness:     Patient presents due to acute low back pain with left sided sciatica  Symptoms started 3 weeks ago  Denies any injury to her lower back or left leg  Pain starts at her low back, travels down her buttock, and then down the leg into her foot  She is getting a lot of numbness in her feet which is causing her gait to be affected  She has been trying ibuprofen for the pain  The pain/numbness has been fairly constant for 3 weeks  No recent imaging on her back  Denies bowel or bladder incontinence       The following portions of the patient's history were reviewed and updated as appropriate: allergies, current medications, past family history, past medical history, past social history, past surgical history and problem list      Review of Systems:     Review of Systems Constitutional: Negative  Respiratory: Negative  Cardiovascular: Negative  Musculoskeletal: Positive for back pain and gait problem  Negative for arthralgias  Neurological: Positive for numbness  Negative for weakness  Problem List:     Patient Active Problem List   Diagnosis    Cervical high risk HPV (human papillomavirus) test positive    Overweight (BMI 25 0-29  9)    Hypercholesterolemia    Prediabetes      Objective:     /76 (BP Location: Left arm, Patient Position: Sitting, Cuff Size: Standard)   Pulse 92   Ht 5' 3" (1 6 m) Comment: w/ shoe denied off  Wt 68 2 kg (150 lb 6 4 oz) Comment: w/ shoe denied off  SpO2 98%   BMI 26 64 kg/m²     Physical Exam   Constitutional: She appears well-developed and well-nourished  No distress  Musculoskeletal:   No tenderness to palpation lumbar spine or gluteal muscle on left; negative straight leg raise; increased pain in her back when lying flat; normal reflexes; decreased sensation to pinprick distal left foot; normal strength LLE   Skin: She is not diaphoretic  PHQ-9  Negative for depression with PHQ2 score of 0  Current Medications:     Current Outpatient Prescriptions   Medication Sig Dispense Refill    cyclobenzaprine (FLEXERIL) 5 mg tablet Take 2 tablets (10 mg total) by mouth 3 (three) times a day as needed for muscle spasms 60 tablet 1    diclofenac sodium (VOLTAREN) 50 mg EC tablet Take 1 tablet (50 mg total) by mouth 2 (two) times a day 60 tablet 1    Methylprednisolone 4 MG TBPK Use as directed on package 21 tablet 0     No current facility-administered medications for this visit          Mandeep Staley DO  [facility]

## 2018-09-13 NOTE — PATIENT INSTRUCTIONS
Lower Back Exercises   AMBULATORY CARE:   Lower back exercises  help heal and strengthen your back muscles to prevent another injury  Ask your healthcare provider if you need to see a physical therapist for more advanced exercises  Seek care immediately if:   · You have severe pain that prevents you from moving  Contact your healthcare provider if:   · Your pain becomes worse  · You have new pain  · You have questions or concerns about your condition or care  Do lower back exercises safely:   · Do the exercises on a mat or firm surface  (not on a bed) to support your spine and prevent low back pain  · Move slowly and smoothly  Avoid fast or jerky motions  · Breathe normally  Do not hold your breath  · Stop if you feel pain  It is normal to feel some discomfort at first  Regular exercise will help decrease your discomfort over time  Lower back exercises: Your healthcare provider may recommend that you do back exercises 10 to 30 minutes each day  He may also recommend that you do exercises 1 to 3 times each day  Ask your healthcare provider which exercises are best for you and how often to do them  · Ankle pumps:  Lie on your back  Move your foot up (with your toes pointing toward your head)  Then, move your foot down (with your toes pointing away from you)  Repeat this exercise 10 times on each side  · Heel slides:  Lie on your back  Slowly bend one leg and then straighten it  Next, bend the other leg and then straighten it  Repeat 10 times on each side  · Pelvic tilt:  Lie on your back with your knees bent and feet flat on the floor  Place your arms in a relaxed position beside your body  Tighten the muscles of your abdomen and flatten your back against the floor  Hold for 5 seconds  Repeat 5 times  · Back stretch:  Lie on your back with your hands behind your head  Bend your knees and turn the lower half of your body to one side   Hold this position for 10 seconds  Repeat 3 times on each side  · Straight leg raises:  Lie on your back with one leg straight  Bend the other knee  Tighten your abdomen and then slowly lift the straight leg up about 6 to 12 inches off the floor  Hold for 1 to 5 seconds  Lower your leg slowly  Repeat 10 times on each leg  · Knee-to-chest:  Lie on your back with your knees bent and feet flat on the floor  Pull one of your knees toward your chest and hold it there for 5 seconds  Return your leg to the starting position  Lift the other knee toward your chest and hold for 5 seconds  Do this 5 times on each side  · Cat and camel:  Place your hands and knees on the floor  Arch your back upward toward the ceiling and lower your head  Round out your spine as much as you can  Hold for 5 seconds  Lift your head upward and push your chest downward toward the floor  Hold for 5 seconds  Do 3 sets or as directed  · Wall squats:  Stand with your back against a wall  Tighten the muscles of your abdomen  Slowly lower your body until your knees are bent at a 45 degree angle  Hold this position for 5 seconds  Slowly move back up to a standing position  Repeat 10 times  · Curl up:  Lie on your back with your knees bent and feet flat on the floor  Place your hands, palms down, underneath the curve in your lower back  Next, with your elbows on the floor, lift your shoulders and chest 2 to 3 inches  Keep your head in line with your shoulders  Hold this position for 5 seconds  When you can do this exercise without pain for 10 to 15 seconds, you may add a rotation  While your shoulders and chest are lifted off the ground, turn slightly to the left and hold  Repeat on the other side  · Bird dog:  Place your hands and knees on the floor  Keep your wrists directly below your shoulders and your knees directly below your hips  Pull your belly button in toward your spine  Do not flatten or arch your back   Tighten your abdominal muscles  Raise one arm straight out so that it is aligned with your head  Next, raise the leg opposite your arm  Hold this position for 15 seconds  Lower your arm and leg slowly and change sides  Do 5 sets  © 2017 [employer] Information is for End User's use only and may not be sold, redistributed or otherwise used for commercial purposes  All illustrations and images included in CareNotes® are the copyrighted property of A D A M [employer] Inc  or Clayton Kim  The above information is an  only  It is not intended as medical advice for individual conditions or treatments  Talk to your doctor, nurse or pharmacist before following any medical regimen to see if it is safe and effective for you

## 2018-09-14 ENCOUNTER — NURSE TRIAGE (OUTPATIENT)
Dept: PHYSICAL THERAPY | Facility: OTHER | Age: 63
End: 2018-09-14

## 2018-09-14 DIAGNOSIS — M54.42 ACUTE LEFT-SIDED LOW BACK PAIN WITH LEFT-SIDED SCIATICA: Primary | ICD-10-CM
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
## 2018-10-08 ENCOUNTER — TELEPHONE (OUTPATIENT)
Dept: OBGYN CLINIC | Facility: CLINIC | Age: 63
End: 2018-10-08

## 2018-10-08 NOTE — TELEPHONE ENCOUNTER
Pt was sen a letter 3/14/18 - she needs a colp - ascus pos hpv  Pt has not had ins since then but now has [employer]  I asked Mariann Padron if we take that ins - not sure  Pt calling ins and will cb  If pt says we take her ins, will explain about colp and sent task to Mariann Padron    Await call back

## 2018-10-08 NOTE — TELEPHONE ENCOUNTER
Pt is calling regarding an issue that her prov [place] asked her to call about,  Pt not sure why,   A  Letter was sent,  pls call pt to advise,  thanks

## 2018-10-24 ENCOUNTER — TELEPHONE (OUTPATIENT)
Dept: OBGYN CLINIC | Facility: CLINIC | Age: 63
End: 2018-10-24

## 2018-10-24 NOTE — TELEPHONE ENCOUNTER
Pt quite annoyed - had no ins and now wants colp asap  Pt originally told me her ins was " [place] [employer]"  When she called carrier she was told to say it is [employer]  She said phone # to ins is [phone] or [phone]  I discussed this with Milena Wadsworth 2 weeks ago and sent It to her but no one ever got back to pt  Not sure whom I should send this to now  So  Sorry

## 2018-10-24 NOTE — TELEPHONE ENCOUNTER
Pt is calling back to speak to Jade Smith regarding colpo Pt said she doesn't want to wait another week she want to get it schedule but is waiting on clearance? ?

## 2018-10-24 NOTE — TELEPHONE ENCOUNTER
Pt called back and I am unable to access previous note  She said to disregard previous phone numbers  She said her ID # is [id number]  Phone # to verify coverage is [phone]  I am so sorry about these tasks

## 2018-10-25 NOTE — TELEPHONE ENCOUNTER
Left message to call back to [facility] her colp - she has Multi plan

## 2018-11-05 NOTE — TELEPHONE ENCOUNTER
Please call patient to schedule this colpo ASAP  Dr Rg Zuniga has some availability on Wednesday 11/14 and Dr Rohit Arambula on 11/20  If no response please forward back to triage for a letter to go out

## 2018-11-14 ENCOUNTER — OFFICE VISIT (OUTPATIENT)
Dept: OBGYN CLINIC | Facility: CLINIC | Age: 63
End: 2018-11-14
Payer: COMMERCIAL

## 2018-11-14 VITALS — WEIGHT: 150.8 LBS | SYSTOLIC BLOOD PRESSURE: 120 MMHG | BODY MASS INDEX: 26.71 KG/M2 | DIASTOLIC BLOOD PRESSURE: 72 MMHG

## 2018-11-14 DIAGNOSIS — R87.810 CERVICAL HIGH RISK HPV (HUMAN PAPILLOMAVIRUS) TEST POSITIVE: Primary | ICD-10-CM

## 2018-11-14 PROCEDURE — 88305 TISSUE EXAM BY PATHOLOGIST: CPT | Performed by: PATHOLOGY

## 2018-11-14 PROCEDURE — 99213 OFFICE O/P EST LOW 20 MIN: CPT | Performed by: OBSTETRICS & GYNECOLOGY

## 2018-11-14 PROCEDURE — 57454 BX/CURETT OF CERVIX W/SCOPE: CPT | Performed by: OBSTETRICS & GYNECOLOGY

## 2018-11-14 NOTE — PROGRESS NOTES
Colposcopy  Date/Time: 11/14/2018 2:18 PM  Performed by: Boogie Majano  Authorized by: Boogie Majano     Consent:     Consent obtained:  Written    Consent given by:  Patient    Procedural risks discussed:  Bleeding and infection    Patient questions answered: yes      Patient agrees, verbalizes understanding, and wants to proceed: yes    Pre-procedure:     Prepped with: acetic acid    Indication:     Indication:  ASC-H  Procedure:     Procedure: Colposcopy w/ cervical biopsy and ECC      Under satisfactory analgesia the patient was prepped and draped in the dorsal lithotomy position: yes      [place] speculum was placed in the vagina: yes      Under colposcopic examination the transition zone was seen in entirety: yes      Endocervix was curetted using a Kevorkian curette: yes      Cervical biopsy performed with a cervical biopsy punch: yes      Monsel's solution was applied: yes      Biopsy(s): yes      Location:  2 O'Clock    Specimen to pathology: yes    Post-procedure:     Patient tolerance of procedure: Tolerated well, no immediate complications  Comments:      Colposcopic impression is that of normal ectocervix  Ectocervical biopsy and ECC were obtained  Patient instructed that I will call with results and likely recommendation will be to have Pap repeated at her annual visit in March

## 2019-01-15 ENCOUNTER — TELEPHONE (OUTPATIENT)
Dept: OBGYN CLINIC | Facility: CLINIC | Age: 64
End: 2019-01-15

## 2019-01-15 NOTE — TELEPHONE ENCOUNTER
Pt is calling for colpo results done in Nov  - pt unhappy - that she has not been notified of her results yet- pls call

## 2019-01-18 ENCOUNTER — APPOINTMENT (OUTPATIENT)
Dept: URGENT CARE | Facility: CLINIC | Age: 64
End: 2019-01-18

## 2019-01-18 ENCOUNTER — TRANSCRIBE ORDERS (OUTPATIENT)
Dept: URGENT CARE | Facility: CLINIC | Age: 64
End: 2019-01-18

## 2019-01-18 ENCOUNTER — APPOINTMENT (OUTPATIENT)
Dept: LAB | Facility: CLINIC | Age: 64
End: 2019-01-18

## 2019-01-18 DIAGNOSIS — Z02.1 PHYSICAL EXAM, PRE-EMPLOYMENT: Primary | ICD-10-CM

## 2019-01-18 DIAGNOSIS — Z02.1 PHYSICAL EXAM, PRE-EMPLOYMENT: ICD-10-CM

## 2019-01-18 LAB — RUBV IGG SERPL IA-ACNC: 120.6 IU/ML

## 2019-01-18 PROCEDURE — 36415 COLL VENOUS BLD VENIPUNCTURE: CPT

## 2019-01-18 PROCEDURE — 86762 RUBELLA ANTIBODY: CPT

## 2019-01-18 PROCEDURE — 86480 TB TEST CELL IMMUN MEASURE: CPT

## 2019-01-18 PROCEDURE — 86706 HEP B SURFACE ANTIBODY: CPT

## 2019-01-18 PROCEDURE — 86765 RUBEOLA ANTIBODY: CPT

## 2019-01-18 PROCEDURE — 86787 VARICELLA-ZOSTER ANTIBODY: CPT

## 2019-01-18 PROCEDURE — 86735 MUMPS ANTIBODY: CPT

## 2019-01-19 LAB — HBV SURFACE AB SER-ACNC: 5.7 MIU/ML

## 2019-01-21 LAB
GAMMA INTERFERON BACKGROUND BLD IA-ACNC: 0.03 IU/ML
M TB IFN-G BLD-IMP: NEGATIVE
M TB IFN-G CD4+ BCKGRND COR BLD-ACNC: -0.01 IU/ML
M TB IFN-G CD4+ BCKGRND COR BLD-ACNC: -0.01 IU/ML
MITOGEN IGNF BCKGRD COR BLD-ACNC: >10 IU/ML
VZV IGG SER IA-ACNC: NORMAL

## 2019-01-22 LAB
MEV IGG SER QL: NORMAL
MUV IGG SER QL: NORMAL

## 2019-02-06 ENCOUNTER — HOSPITAL ENCOUNTER (EMERGENCY)
Facility: HOSPITAL | Age: 64
Discharge: HOME | End: 2019-02-06
Attending: EMERGENCY MEDICINE | Admitting: EMERGENCY MEDICINE
Payer: OTHER MISCELLANEOUS

## 2019-02-06 VITALS
WEIGHT: 153.44 LBS | TEMPERATURE: 97.9 F | OXYGEN SATURATION: 99 % | RESPIRATION RATE: 18 BRPM | BODY MASS INDEX: 28.97 KG/M2 | SYSTOLIC BLOOD PRESSURE: 140 MMHG | DIASTOLIC BLOOD PRESSURE: 84 MMHG | HEIGHT: 61 IN | HEART RATE: 87 BPM

## 2019-02-06 DIAGNOSIS — S29.012A MUSCLE STRAIN OF RIGHT UPPER BACK, INITIAL ENCOUNTER: Primary | ICD-10-CM

## 2019-02-06 DIAGNOSIS — M79.2 RADICULAR PAIN IN RIGHT ARM: ICD-10-CM

## 2019-02-06 PROCEDURE — 99283 EMERGENCY DEPT VISIT LOW MDM: CPT

## 2019-02-06 RX ORDER — NAPROXEN 500 MG/1
500 TABLET ORAL 2 TIMES DAILY WITH MEALS
Qty: 10 TABLET | Refills: 0 | Status: SHIPPED | OUTPATIENT
Start: 2019-02-06 | End: 2019-02-14 | Stop reason: CLARIF

## 2019-02-06 RX ORDER — LIDOCAINE 50 MG/G
1 PATCH TOPICAL ONCE
Status: DISCONTINUED | OUTPATIENT
Start: 2019-02-06 | End: 2019-02-06 | Stop reason: HOSPADM

## 2019-02-06 RX ORDER — NAPROXEN 250 MG/1
500 TABLET ORAL ONCE
Status: COMPLETED | OUTPATIENT
Start: 2019-02-06 | End: 2019-02-06

## 2019-02-06 RX ADMIN — NAPROXEN 500 MG: 250 TABLET ORAL at 08:45

## 2019-02-06 RX ADMIN — LIDOCAINE 1 PATCH: 50 PATCH TOPICAL at 08:45

## 2019-02-06 NOTE — DISCHARGE INSTRUCTIONS
Muscle Strain   WHAT YOU NEED TO KNOW:   A muscle strain is a twist, pull, or tear of a muscle or tendon  A tendon is a strong elastic tissue that connects a muscle to a bone  Signs of a strained muscle include bruising and swelling over the area, pain with movement, and loss of strength  DISCHARGE INSTRUCTIONS:   Return to the emergency department if:   · You suddenly cannot feel or move your injured muscle  Contact your healthcare provider if:   · Your pain and swelling worsen or do not go away  · You have questions or concerns about your condition or care  Medicines:   · NSAIDs  help decrease swelling and pain or fever  This medicine is available with or without a doctor's order  NSAIDs can cause stomach bleeding or kidney problems in certain people  If you take blood thinner medicine, always ask your healthcare provider if NSAIDs are safe for you  Always read the medicine label and follow directions  · Muscle relaxers  help decrease pain and muscle spasms  · Take your medicine as directed  Contact your healthcare provider if you think your medicine is not helping or if you have side effects  Tell him of her if you are allergic to any medicine  Keep a list of the medicines, vitamins, and herbs you take  Include the amounts, and when and why you take them  Bring the list or the pill bottles to follow-up visits  Carry your medicine list with you in case of an emergency  Follow up with your healthcare provider as directed: Your healthcare provider may suggest that you have a follow-up visit before you go back to your usual activity  Write down your questions so you remember to ask them during your visits  Self-care:   · 3 to 7 days after the injury:  Use Rest, Ice, Compression, and Elevation (RICE) to help stop bruising and decrease pain and swelling  ¨ Rest:  Rest your muscle to allow your injury to heal  When the pain decreases, begin normal, slow movements   For mild and moderate muscle strains, you should rest your muscles for about 2 days  However, if you have a severe muscle strain, you should rest for 10 to 14 days  You may need to use crutches to walk if your muscle strain is in your legs or lower body  ¨ Ice:  Put an ice pack on the injured area  Put a towel between the ice pack and your skin  Do not put the ice pack directly on your skin  You can use a package of frozen peas instead of an ice pack  ¨ Compression:  You may need to wrap an elastic bandage around the area to decrease swelling  It should be tight enough for you to feel support  Do not wrap it too tightly  ¨ Elevation:  Keep the injured muscle raised above your heart if possible  For example if you have a strain of your lower leg muscle, lie down and prop your leg up on pillows  This helps decrease pain and swelling  · 3 to 21 days after the injury:  Start to slowly and regularly exercise your muscle  This will help it heal  If you feel pain, decrease how hard you are exercising  · 1 to 6 weeks after the injury:  Stretch the injured muscle  Hold the stretch for about 30 seconds  Do this 4 times a day  You may stretch the muscle until you feel a slight pull  Stop stretching if you feel pain  · 2 weeks to 6 months after the injury:  The goal of this phase is to return to the activity you were doing before the injury happened, without hurting the muscle again  · 3 weeks to 6 months after the injury:  Keep stretching and strengthening your muscles to avoid injury  Slowly increase the time and distance that you exercise  You may have signs and symptoms of muscle strain 6 months after the injury, even if you do things to help it heal  In this case, you may need surgery on the muscle  © 2017 [address] [employer] Information is for End User's use only and may not be sold, redistributed or otherwise used for commercial purposes   All illustrations and images included in CareNotes® are the copyrighted property of [employer]  or Clayton Kim  The above information is an  only  It is not intended as medical advice for individual conditions or treatments  Talk to your doctor, nurse or pharmacist before following any medical regimen to see if it is safe and effective for you

## 2019-02-06 NOTE — ED PROVIDER NOTES
History  Chief Complaint   Patient presents with    Back Pain     Pt states she had back pain and right arm pain last night  Pt states she feels better today  This is a pleasant 71-year-old female that presents here with a work-related injury  Yesterday she was exiting the laundry room when she accidentally tripped over a coworkers foot who was washing her hands at the time  This caused her to stumble into the hallway but she did not actually fall but she reports that she did sort of a running stumble  While she was falling she was reaching for objects and then later that night she started to developed right upper back pain and then some right-sided arm radiculopathy  Today she states she feels better and that she came here just as a formality  Prior to Admission Medications   Prescriptions Last Dose Informant Patient Reported? Taking?   diclofenac sodium (VOLTAREN) 50 mg EC tablet   No No   Sig: Take 1 tablet (50 mg total) by mouth 2 (two) times a day      Facility-Administered Medications: None       Past Medical History:   Diagnosis Date    Ovarian cyst     Postmenopausal atrophic vaginitis     Uterine prolapse        Past Surgical History:   Procedure Laterality Date    COLONOSCOPY  2014    PARTIAL HYSTERECTOMY      supracervical       Family History   Problem Relation Age of Onset    No Known Problems Mother     No Known Problems Father     Breast cancer Sister     Diabetes Family     Hypertension Family     Diabetes Other     Hypertension Other      I have reviewed and agree with the history as documented  Social History   Substance Use Topics    Smoking status: Never Smoker    Smokeless tobacco: Never Used    Alcohol use No        Review of Systems   Constitutional: Negative for activity change, fatigue and fever  HENT: Negative for congestion, ear pain, rhinorrhea and sore throat  Eyes: Negative      Respiratory: Negative for cough, shortness of breath and wheezing  Gastrointestinal: Negative for abdominal pain, diarrhea, nausea and vomiting  Endocrine: Negative  Genitourinary: Negative for difficulty urinating, dyspareunia, dysuria, flank pain, frequency, menstrual problem, pelvic pain, urgency, vaginal bleeding, vaginal discharge and vaginal pain  Musculoskeletal: Positive for back pain  Negative for arthralgias and myalgias  Skin: Negative for color change and pallor  Neurological: Negative for dizziness, speech difficulty, weakness and headaches  Hematological: Negative for adenopathy  Psychiatric/Behavioral: Negative for confusion  Physical Exam  Physical Exam   Constitutional: She is oriented to person, place, and time  She appears well-developed and well-nourished  She is cooperative  Non-toxic appearance  She does not have a sickly appearance  She does not appear ill  No distress  HENT:   Head: Normocephalic and atraumatic  Right Ear: Tympanic membrane and external ear normal    Left Ear: Tympanic membrane and external ear normal    Nose: No rhinorrhea, sinus tenderness or nasal deformity  No epistaxis  Right sinus exhibits no maxillary sinus tenderness and no frontal sinus tenderness  Left sinus exhibits no maxillary sinus tenderness and no frontal sinus tenderness  Mouth/Throat: Oropharynx is clear and moist and mucous membranes are normal  Normal dentition  Eyes: Pupils are equal, round, and reactive to light  EOM are normal    Neck: Normal range of motion  Neck supple  Cardiovascular: Normal rate, regular rhythm and normal heart sounds  No murmur heard  Pulmonary/Chest: Effort normal and breath sounds normal  No accessory muscle usage  No respiratory distress  She has no wheezes  She has no rales  She exhibits no tenderness  Abdominal: Soft  She exhibits no distension  There is no guarding  Musculoskeletal: Normal range of motion  She exhibits no edema          Thoracic back: She exhibits tenderness, pain and spasm         Back:    Lymphadenopathy:     She has no cervical adenopathy  Neurological: She is alert and oriented to person, place, and time  She exhibits normal muscle tone  Skin: Skin is warm and dry  No rash noted  No erythema  Psychiatric: She has a normal mood and affect  Nursing note and vitals reviewed        Vital Signs  ED Triage Vitals [02/06/19 0805]   Temperature Pulse Respirations Blood Pressure SpO2   97 9 °F (36 6 °C) 87 18 140/84 99 %      Temp Source Heart Rate Source Patient Position - Orthostatic VS BP Location FiO2 (%)   Oral Monitor Sitting Right arm --      Pain Score       No Pain           Vitals:    02/06/19 0805   BP: 140/84   Pulse: 87   Patient Position - Orthostatic VS: Sitting       Visual Acuity      ED Medications  Medications   lidocaine (LIDODERM) 5 % patch 1 patch (1 patch Topical Medication Applied 2/6/19 0845)   naproxen (NAPROSYN) tablet 500 mg (500 mg Oral Given 2/6/19 0845)       Diagnostic Studies  Results Reviewed     None                 No orders to display              Procedures  Procedures       Phone Contacts  ED Phone Contact    ED Course                               MDM  Number of Diagnoses or Management Options  Muscle strain of right upper back, initial encounter: new and requires workup  Radicular pain in right arm: new and requires workup  Diagnosis management comments: Supportive therapy with stretching and anti-inflammatories as needed if pain persist follow up with occupational medicine or Sports Medicine or PCP    Patient Progress  Patient progress: stable      Disposition  Final diagnoses:   Muscle strain of right upper back, initial encounter   Radicular pain in right arm     Time reflects when diagnosis was documented in both MDM as applicable and the Disposition within this note     Time User Action Codes Description Comment    2/6/2019  8:43 AM Melany Carlson Add [S29 012A] Muscle strain of right upper back, initial encounter     2/6/2019  8:43 AIDA Kwan Add [M79 2] Radicular pain in right arm       ED Disposition     ED Disposition Condition Date/Time Comment    Discharge  Wed Feb 6, 2019  8:43 AM Theodoro Fothergill discharge to home/self care  Condition at discharge: Stable        Follow-up Information     Follow up With Specialties Details Why [address]  Schedule an appointment as soon as possible for a visit For Continued Evaluation [address] [place] [address]  [phone]          Discharge Medication List as of 2/6/2019  8:45 AM      START taking these medications    Details   naproxen (NAPROSYN) 500 mg tablet Take 1 tablet (500 mg total) by mouth 2 (two) times a day with meals for 5 days, Starting Wed 2/6/2019, Until Mon 2/11/2019, Print         STOP taking these medications       diclofenac sodium (VOLTAREN) 50 mg EC tablet Comments:   Reason for Stopping:             No discharge procedures on file      ED Provider  Electronically Signed by           ANGELA Zhao  02/06/19 7077

## 2019-02-14 ENCOUNTER — OFFICE VISIT (OUTPATIENT)
Dept: INTERNAL MEDICINE CLINIC | Facility: CLINIC | Age: 64
End: 2019-02-14
Payer: COMMERCIAL

## 2019-02-14 ENCOUNTER — TELEPHONE (OUTPATIENT)
Dept: GASTROENTEROLOGY | Facility: CLINIC | Age: 64
End: 2019-02-14

## 2019-02-14 VITALS
DIASTOLIC BLOOD PRESSURE: 82 MMHG | TEMPERATURE: 99.1 F | HEIGHT: 61 IN | OXYGEN SATURATION: 98 % | HEART RATE: 98 BPM | WEIGHT: 147.6 LBS | SYSTOLIC BLOOD PRESSURE: 122 MMHG | BODY MASS INDEX: 27.87 KG/M2

## 2019-02-14 DIAGNOSIS — J06.9 ACUTE URI: Primary | ICD-10-CM

## 2019-02-14 DIAGNOSIS — R21 RASH: ICD-10-CM

## 2019-02-14 DIAGNOSIS — Z12.11 SCREENING FOR COLON CANCER: ICD-10-CM

## 2019-02-14 PROCEDURE — 3008F BODY MASS INDEX DOCD: CPT | Performed by: INTERNAL MEDICINE

## 2019-02-14 PROCEDURE — 99213 OFFICE O/P EST LOW 20 MIN: CPT | Performed by: INTERNAL MEDICINE

## 2019-02-14 RX ORDER — PROMETHAZINE HYDROCHLORIDE AND CODEINE PHOSPHATE 6.25; 1 MG/5ML; MG/5ML
5 SYRUP ORAL EVERY 4 HOURS PRN
Qty: 473 ML | Refills: 1 | Status: SHIPPED | OUTPATIENT
Start: 2019-02-14 | End: 2021-03-26 | Stop reason: CLARIF

## 2019-02-14 RX ORDER — TRIAMCINOLONE ACETONIDE 1 MG/G
CREAM TOPICAL 2 TIMES DAILY
Qty: 30 G | Refills: 0 | Status: SHIPPED | OUTPATIENT
Start: 2019-02-14 | End: 2021-03-26 | Stop reason: CLARIF

## 2019-02-14 NOTE — TELEPHONE ENCOUNTER
ptn passed OA, please call to schedule with Dr Terence Melgar   she only has ins till the end of the month

## 2019-02-14 NOTE — PATIENT INSTRUCTIONS
Urticaria   AMBULATORY CARE:   Urticaria  is also called hives  Hives can change size and shape, and appear anywhere on your skin  They can be mild or severe and last from a few minutes to a few days  Hives may be a sign of a severe allergic reaction called anaphylaxis that needs immediate treatment  Urticaria that lasts longer than 6 weeks may be a chronic condition that needs long-term treatment  Call 911 for signs or symptoms of anaphylaxis,  such as trouble breathing, swelling in your mouth or throat, or wheezing  You may also have itching, a rash, or feel like you are going to faint  Seek care immediately if:   · Your heart is beating faster than it normally does  · You have cramping or severe pain in your abdomen  Contact your healthcare provider if:   · You have a fever  · Your skin still itches 24 hours after you take your medicine  · You still have hives after 7 days  · Your joints are painful and swollen  · You have questions or concerns about your condition or care  Steps to take for signs or symptoms of anaphylaxis:   · Immediately  give 1 shot of epinephrine only into the outer thigh muscle  · Leave the shot in place  as directed  Your healthcare provider may recommend you leave it in place for up to 10 seconds before you remove it  This helps make sure all of the epinephrine is delivered  · Call 911 and go to the emergency department,  even if the shot improved symptoms  Do not drive yourself  Bring the used epinephrine shot with you  Treatment for mild urticaria  may not be needed  Chronic urticaria may need to be treated with more than one medicine, or other medicines than listed below  The following are common medicines used to treat urticaria:  · Antihistamines  decrease mild symptoms such as itching or a rash  · Steroids  decrease redness, pain, and swelling  · Epinephrine  is used to treat severe allergic reactions such as anaphylaxis    Safety precautions to take if you are at risk for anaphylaxis:   · Keep 2 shots of epinephrine with you at all times  You may need a second shot, because epinephrine only works for about 20 minutes and symptoms may return  Your healthcare provider can show you and family members how to give the shot  Check the expiration date every month and replace it before it expires  · Create an action plan  Your healthcare provider can help you create a written plan that explains the allergy and an emergency plan to treat a reaction  The plan explains when to give a second epinephrine shot if symptoms return or do not improve after the first  Give copies of the action plan and emergency instructions to family members, work and school staff, and  providers  Show them how to give a shot of epinephrine  · Be careful when you exercise  If you have had exercise-induced anaphylaxis, do not exercise right after you eat  Stop exercising right away if you start to develop any signs or symptoms of anaphylaxis  You may first feel tired, warm, or have itchy skin  Hives, swelling, and severe breathing problems may develop if you continue to exercise  · Carry medical alert identification  Wear medical alert jewelry or carry a card that explains the allergy  Ask your healthcare provider where to get these items  · Keep a record of triggers and symptoms  Record everything you eat, drink, or apply to your skin for 3 weeks  Include stressful events and what you were doing right before your hives started  Bring the record with you to follow-up visits with your healthcare provider  Manage urticaria:   · Cool your skin  This may help decrease itching  Apply a cool pack to your hives  Dip a hand towel in cool water, wring it out, and place it on your hives  You may also soak your skin in a cool oatmeal bath  · Do not rub your hives  This can irritate your skin and cause more hives  · Wear loose clothing    Tight clothes may irritate your skin and cause more hives  · Manage stress  Stress may trigger hives, or make them worse  Learn new ways to relax, such as deep breathing  Follow up with your healthcare provider as directed:  Write down your questions so you remember to ask them during your visits  © 2017 [address] [employer] Information is for End User's use only and may not be sold, redistributed or otherwise used for commercial purposes  All illustrations and images included in CareNotes® are the copyrighted property of [employer]  or Clayton Kim  The above information is an  only  It is not intended as medical advice for individual conditions or treatments  Talk to your doctor, nurse or pharmacist before following any medical regimen to see if it is safe and effective for you

## 2019-02-14 NOTE — PROGRESS NOTES
[facility] VIS[facility]  [facility] [address] [place] [address]    NAME: Dagmar Walsh  AGE: 59 y o  SEX: female  : 1955     DATE: 2019     Assessment and Plan:     1  Acute URI    Discussed supportive care  Use cough medicine as needed  No need for antibiotics  Increase fluid intake  Call if symptoms not improving     - promethazine-codeine (PHENERGAN WITH CODEINE) 6 25-10 mg/5 mL syrup; Take 5 mL by mouth every 4 (four) hours as needed for cough  Dispense: 473 mL; Refill: 1    2  Rash    Recommend use of steroid cream and anti-histamines OTC    - triamcinolone (KENALOG) 0 1 % cream; Apply topically 2 (two) times a day for 28 days  Dispense: 30 g; Refill: 0    3  Screening for colon cancer  - Ambulatory referral to Gastroenterology; Future    BMI Counseling: Body mass index is 27 89 kg/m²  Discussed the patient's BMI with her  The BMI is above average  BMI counseling and education was provided to the patient  Exercise recommendations include exercising 3-5 times per week  Chief Complaint:     Chief Complaint   Patient presents with    Sore Throat     since; Monday; hurts and dry in the morning  (very thristy in the morning)    Rash     (L) thigh, (R) arm, (both) feet, very itchy    Earache     (L) ear    Tongue Swelling    Headache      History of Present Illness:     Patient with sore/scratchy throat, ear ache, eye puffiness, and rash since Monday  A couple raised spots on right arm and left thigh  Dry cough at times  No nasal congestion or post-nasal drip  No sneezing  The following portions of the patient's history were reviewed and updated as appropriate: allergies, current medications, past family history, past medical history, past social history, past surgical history and problem list      Review of Systems:     Review of Systems   Constitutional: Negative for activity change, chills, fatigue and fever  HENT: Positive for sore throat  Negative for congestion  Respiratory: Positive for cough  Cardiovascular: Negative  Gastrointestinal: Negative  Skin: Positive for rash  Neurological: Positive for headaches  Negative for dizziness and light-headedness  Problem List:     Patient Active Problem List   Diagnosis    Cervical high risk HPV (human papillomavirus) test positive    Overweight (BMI 25 0-29  9)    Hypercholesterolemia    Prediabetes      Objective:     /82 (BP Location: Left arm, Patient Position: Sitting, Cuff Size: Standard)   Pulse 98   Temp 99 1 °F (37 3 °C) (Tympanic)   Ht 5' 1" (1 549 m) Comment: w/ shoe denied off  Wt 67 kg (147 lb 9 6 oz) Comment: w/ shoe denied off  SpO2 98%   BMI 27 89 kg/m²     Physical Exam   Constitutional: She appears well-developed and well-nourished  Non-toxic appearance  She does not appear ill  No distress  HENT:   Head: Normocephalic and atraumatic  Right Ear: Hearing, tympanic membrane and ear canal normal    Left Ear: Hearing, tympanic membrane and ear canal normal    Mouth/Throat: Uvula is midline, oropharynx is clear and moist and mucous membranes are normal  No tonsillar exudate  Eyes: Pupils are equal, round, and reactive to light  EOM are normal    Neck: Normal range of motion  Neck supple  Cardiovascular: Normal rate and normal heart sounds  No murmur heard  Pulmonary/Chest: Effort normal and breath sounds normal    Abdominal: Soft  Bowel sounds are normal    Lymphadenopathy:     She has no cervical adenopathy  Skin: Rash (hives) noted         Sabrina Ontiveros DO  [facility] [address] [place] [address]

## 2019-02-15 PROBLEM — Z12.11 SCREEN FOR COLON CANCER: Status: ACTIVE | Noted: 2019-02-15

## 2019-02-18 ENCOUNTER — TELEPHONE (OUTPATIENT)
Dept: OBGYN CLINIC | Facility: CLINIC | Age: 64
End: 2019-02-18

## 2019-02-18 NOTE — TELEPHONE ENCOUNTER
Pt said she called numerous times about her colp - never got results  I told her it looked normal to me - was done in Nov  She wants f/u, results and wants them in writing  Can we affirm all is neg  When to repap?   May I send her copy of tissue exam   Thx

## 2019-02-19 ENCOUNTER — TELEPHONE (OUTPATIENT)
Dept: GASTROENTEROLOGY | Facility: CLINIC | Age: 64
End: 2019-02-19

## 2019-02-19 ENCOUNTER — ANESTHESIA EVENT (OUTPATIENT)
Dept: PERIOP | Facility: HOSPITAL | Age: 64
End: 2019-02-19
Payer: COMMERCIAL

## 2019-02-19 NOTE — TELEPHONE ENCOUNTER
RODRIGUEZ: Spoke with patient  Patient states she "chugged" a glass of miralax prep and vomited some of it  Advised patient to take a break, start again in an hour, suck on a lemon wedge, drink prep slower

## 2019-02-19 NOTE — TELEPHONE ENCOUNTER
ptn is throwing up and shaking when drinking the miralax solution for her colon prep   Please advise

## 2019-02-19 NOTE — TELEPHONE ENCOUNTER
Yes, please  I signed-off the normal result  I am not sure why the message never got to her  I did tell her at time of colpo that I felt it was normal and that followup would be a PAP with her annual in March  I tried to call her this evening to offer my apologies, there was no answer

## 2019-02-20 ENCOUNTER — HOSPITAL ENCOUNTER (OUTPATIENT)
Facility: HOSPITAL | Age: 64
Setting detail: OUTPATIENT SURGERY
Discharge: HOME | End: 2019-02-20
Attending: INTERNAL MEDICINE | Admitting: INTERNAL MEDICINE
Payer: COMMERCIAL

## 2019-02-20 ENCOUNTER — ANESTHESIA (OUTPATIENT)
Dept: PERIOP | Facility: HOSPITAL | Age: 64
End: 2019-02-20
Payer: COMMERCIAL

## 2019-02-20 VITALS
DIASTOLIC BLOOD PRESSURE: 69 MMHG | TEMPERATURE: 98.9 F | WEIGHT: 146.39 LBS | RESPIRATION RATE: 16 BRPM | HEART RATE: 85 BPM | OXYGEN SATURATION: 100 % | HEIGHT: 61 IN | BODY MASS INDEX: 27.64 KG/M2 | SYSTOLIC BLOOD PRESSURE: 101 MMHG

## 2019-02-20 DIAGNOSIS — Z12.11 SCREEN FOR COLON CANCER: ICD-10-CM

## 2019-02-20 PROCEDURE — 88305 TISSUE EXAM BY PATHOLOGIST: CPT | Performed by: PATHOLOGY

## 2019-02-20 PROCEDURE — 45385 COLONOSCOPY W/LESION REMOVAL: CPT | Performed by: INTERNAL MEDICINE

## 2019-02-20 RX ORDER — PHENOL 1.4 %
600 AEROSOL, SPRAY (ML) MUCOUS MEMBRANE 2 TIMES DAILY WITH MEALS
COMMUNITY
End: 2021-03-26 | Stop reason: CLARIF

## 2019-02-20 RX ORDER — MULTIVITAMIN
1 CAPSULE ORAL DAILY
COMMUNITY

## 2019-02-20 RX ORDER — LIDOCAINE HYDROCHLORIDE 10 MG/ML
INJECTION, SOLUTION INFILTRATION; PERINEURAL AS NEEDED
Status: DISCONTINUED | OUTPATIENT
Start: 2019-02-20 | End: 2019-02-20 | Stop reason: SURG

## 2019-02-20 RX ORDER — MELATONIN
1000 DAILY
COMMUNITY

## 2019-02-20 RX ORDER — PROPOFOL 10 MG/ML
INJECTION, EMULSION INTRAVENOUS AS NEEDED
Status: DISCONTINUED | OUTPATIENT
Start: 2019-02-20 | End: 2019-02-20 | Stop reason: SURG

## 2019-02-20 RX ORDER — SODIUM CHLORIDE, SODIUM LACTATE, POTASSIUM CHLORIDE, CALCIUM CHLORIDE 600; 310; 30; 20 MG/100ML; MG/100ML; MG/100ML; MG/100ML
INJECTION, SOLUTION INTRAVENOUS CONTINUOUS PRN
Status: DISCONTINUED | OUTPATIENT
Start: 2019-02-20 | End: 2019-02-20 | Stop reason: SURG

## 2019-02-20 RX ORDER — ASPIRIN 81 MG/1
81 TABLET, CHEWABLE ORAL EVERY OTHER DAY
COMMUNITY
End: 2021-03-26 | Stop reason: CLARIF

## 2019-02-20 RX ORDER — SODIUM CHLORIDE, SODIUM LACTATE, POTASSIUM CHLORIDE, CALCIUM CHLORIDE 600; 310; 30; 20 MG/100ML; MG/100ML; MG/100ML; MG/100ML
125 INJECTION, SOLUTION INTRAVENOUS CONTINUOUS
Status: DISCONTINUED | OUTPATIENT
Start: 2019-02-20 | End: 2019-02-20 | Stop reason: HOSPADM

## 2019-02-20 RX ORDER — CHOLECALCIFEROL (VITAMIN D3) 125 MCG
CAPSULE ORAL DAILY
COMMUNITY

## 2019-02-20 RX ADMIN — LIDOCAINE HYDROCHLORIDE 50 MG: 10 INJECTION, SOLUTION INFILTRATION; PERINEURAL at 09:47

## 2019-02-20 RX ADMIN — PROPOFOL 50 MG: 10 INJECTION, EMULSION INTRAVENOUS at 09:57

## 2019-02-20 RX ADMIN — PROPOFOL 50 MG: 10 INJECTION, EMULSION INTRAVENOUS at 09:51

## 2019-02-20 RX ADMIN — PROPOFOL 130 MG: 10 INJECTION, EMULSION INTRAVENOUS at 09:47

## 2019-02-20 RX ADMIN — SODIUM CHLORIDE, SODIUM LACTATE, POTASSIUM CHLORIDE, AND CALCIUM CHLORIDE: .6; .31; .03; .02 INJECTION, SOLUTION INTRAVENOUS at 09:47

## 2019-02-20 RX ADMIN — SODIUM CHLORIDE, SODIUM LACTATE, POTASSIUM CHLORIDE, AND CALCIUM CHLORIDE 125 ML/HR: .6; .31; .03; .02 INJECTION, SOLUTION INTRAVENOUS at 09:41

## 2019-02-20 NOTE — OP NOTE
Postoperative Note  PATIENT NAME: Montez Bryant  : 1955  MRN: [id number]  [facility] RO[facility] 01    Surgery Date: 2019    POST-OP DIAGNOSIS: See the impression below    SEDATION: Monitored anesthesia care, check anesthesia records    PHYSICAL EXAM:  Vitals:    19 0935   BP: 114/72   Pulse: 76   Resp: 16   Temp: 97 9 °F (36 6 °C)   SpO2: 99%     Body mass index is 27 66 kg/m²  General: NAD  Heart: S1 & S2 normal, RRR  Lungs: CTA, No rales or rhonchi  Abdomen: Soft, nontender, nondistended, good bowel sounds    CONSENT:  Informed consent was obtained for the procedure, including sedation after explaining the risks and benefits of the procedure  Risks including but not limited to bleeding, perforation, infection, aspiration were discussed in detail  Also explained about less than 100%$ sensitivity with the exam and other alternatives  DESCRIPTION:   Procedure:  Fiberoptic colonoscopy with cold snare polypectomy    Indications:  80-year-old female for average risk screening colonoscopy  Last colonoscopy 10 years ago    ASA 2    Estimated blood loss: Insignificant    Premedicated with mac anesthesia    Patient is identified by me  She is examined prior to the procedure and found to be in stable condition  She is attached to the cardiac monitor and pulse oximeter and placed in the left lateral position  After adequate intravenous sedation the Olympus video colonoscope was inserted and passed easily the cecum  The ileocecal valve and the appendiceal orifice are identified and the scope slowly withdrawn with excellent circumferential visualization of the mucosa  Preparation is excellent  In the splenic flexure there is a single 5 mm sessile polyp removed with cold snare polypectomy technique with excellent hemostasis  The polyp was retrieved and sent to pathology  No other inflammatory neoplastic or vascular lesions noted    Retroversion is normal   She tolerated the procedure well was stable throughout  My impression:  Splenic flexure polyp, status post cold snare polypectomy    RECOMMENDATIONS:  Follow up biopsy results in 1 week  Follow-up colonoscopy in 5 years    COMPLICATIONS:  None; patient tolerated the procedure well  DISPOSITION: PACU  CONDITION: Stable    Melanie Higgins MD  2/20/2019,10:04 AM        Portions of the record may have been created with voice recognition software   Occasional wrong word or "sound a like" substitutions may have occurred due to the inherent limitations of voice recognition software   Read the chart carefully and recognize, using context, where substitutions have occurred

## 2019-02-20 NOTE — ANESTHESIA POSTPROCEDURE EVALUATION
Post-Op Assessment Note    CV Status:  Stable  Pain Score: 2    Pain management: adequate     Mental Status:  Alert and awake   Hydration Status:  Euvolemic   PONV Controlled:  None   Airway Patency:  Patent   Post Op Vitals Reviewed: Yes      Staff: Anesthesiologist, with CRNAs           BP      Temp      Pulse     Resp      SpO2

## 2019-02-20 NOTE — DISCHARGE INSTR - AVS FIRST PAGE
Postoperative Note  PATIENT NAME: Kaitlyn George  : 1955  MRN: [id number]  [facility] 01    Surgery Date: 2019    POST-OP DIAGNOSIS: See the impression below    SEDATION: Monitored anesthesia care, check anesthesia records    PHYSICAL EXAM:  Vitals:    19 0935   BP: 114/72   Pulse: 76   Resp: 16   Temp: 97 9 °F (36 6 °C)   SpO2: 99%     Body mass index is 27 66 kg/m²  General: NAD  Heart: S1 & S2 normal, RRR  Lungs: CTA, No rales or rhonchi  Abdomen: Soft, nontender, nondistended, good bowel sounds    CONSENT:  Informed consent was obtained for the procedure, including sedation after explaining the risks and benefits of the procedure  Risks including but not limited to bleeding, perforation, infection, aspiration were discussed in detail  Also explained about less than 100%$ sensitivity with the exam and other alternatives  DESCRIPTION:   Procedure:  Fiberoptic colonoscopy with cold snare polypectomy    Indications:  54-year-old female for average risk screening colonoscopy  Last colonoscopy 10 years ago    ASA 2    Estimated blood loss: Insignificant    Premedicated with mac anesthesia    Patient is identified by me  She is examined prior to the procedure and found to be in stable condition  She is attached to the cardiac monitor and pulse oximeter and placed in the left lateral position  After adequate intravenous sedation the Olympus video colonoscope was inserted and passed easily the cecum  The ileocecal valve and the appendiceal orifice are identified and the scope slowly withdrawn with excellent circumferential visualization of the mucosa  Preparation is excellent  In the splenic flexure there is a single 5 mm sessile polyp removed with cold snare polypectomy technique with excellent hemostasis  The polyp was retrieved and sent to pathology  No other inflammatory neoplastic or vascular lesions noted    Retroversion is normal   She tolerated the procedure well was stable throughout  My impression:  Splenic flexure polyp, status post cold snare polypectomy    RECOMMENDATIONS:  Follow up biopsy results in 1 week  Follow-up colonoscopy in 5 years    COMPLICATIONS:  None; patient tolerated the procedure well  DISPOSITION: PACU  CONDITION: Stable    Sonia Morejon MD  2/20/2019,10:04 AM        Portions of the record may have been created with voice recognition software   Occasional wrong word or "sound a like" substitutions may have occurred due to the inherent limitations of voice recognition software   Read the chart carefully and recognize, using context, where substitutions have occurred

## 2019-02-20 NOTE — ANESTHESIA PREPROCEDURE EVALUATION
Review of Systems/Medical History  Patient summary reviewed        Cardiovascular  Hyperlipidemia,    Pulmonary  Negative pulmonary ROS        GI/Hepatic  Negative GI/hepatic ROS          Negative  ROS        Endo/Other  Negative endo/other ROS      GYN  Negative gynecology ROS          Hematology  Negative hematology ROS      Musculoskeletal  Negative musculoskeletal ROS        Neurology  Negative neurology ROS      Psychology   Negative psychology ROS                   Anesthesia Plan  ASA Score- 2     Anesthesia Type- IV sedation with anesthesia with ASA Monitors  Additional Monitors:   Airway Plan:         Plan Factors-    Induction- intravenous  Postoperative Plan-     Informed Consent- Anesthetic plan and risks discussed with patient  I personally reviewed this patient with the CRNA  Discussed and agreed on the Anesthesia Plan with the CRNA  Lawson Smith

## 2019-02-20 NOTE — DISCHARGE INSTRUCTIONS
Colonoscopy   WHAT YOU NEED TO KNOW:   A colonoscopy is a procedure to examine the inside of your colon (intestine) with a scope  Polyps or tissue growths may have been removed during your colonoscopy  It is normal to feel bloated and to have some abdominal discomfort  You should be passing gas  If you have hemorrhoids or you had polyps removed, you may have a small amount of bleeding  DISCHARGE INSTRUCTIONS:   Seek care immediately if:   · You have a large amount of bright red blood in your bowel movements  · Your abdomen is hard and firm and you have severe pain  · You have sudden trouble breathing  Contact your healthcare provider if:   · You develop a rash or hives  · You have a fever within 24 hours of your procedure  · You have not had a bowel movement for 3 days after your procedure  · You have questions or concerns about your condition or care  Activity:   · Do not lift, strain, or run  for 3 days after your procedure  · Rest after your procedure  You have been given medicine to relax you  Do not  drive or make important decisions until the day after your procedure  Return to your normal activity as directed  · Relieve gas and discomfort from bloating  by lying on your right side with a heating pad on your abdomen  You may need to take short walks to help the gas move out  Eat small meals until bloating is relieved  If you had polyps removed: For 7 days after your procedure:    · Do not  go on long car rides  Help prevent constipation:   · Eat a variety of healthy foods  Healthy foods include fruit, vegetables, whole-grain breads, low-fat dairy products, beans, lean meat, and fish  Ask if you need to be on a special diet  Your healthcare provider may recommend that you eat high-fiber foods such as cooked beans  Fiber helps you have regular bowel movements  · Drink liquids as directed  Adults should drink between 9 and 13 eight-ounce cups of liquid every day   Ask what amount is best for you  For most people, good liquids to drink are water, juice, and milk  · Exercise as directed  Talk to your healthcare provider about the best exercise plan for you  Exercise can help prevent constipation, decrease your blood pressure and improve your health  Follow up with your healthcare provider as directed:  Write down your questions so you remember to ask them during your visits  © 2017 [address] [employer]  Information is for End User's use only and may not be sold, redistributed or otherwise used for commercial purposes  All illustrations and images included in CareNotes® are the copyrighted property of [employer]  or Clayton Kim  The above information is an  only  It is not intended as medical advice for individual conditions or treatments  Talk to your doctor, nurse or pharmacist before following any medical regimen to see if it is safe and effective for you

## 2019-02-20 NOTE — TELEPHONE ENCOUNTER
Pt aware of normal colp results - f/u pap in March   I did send pt colposcopy report with note stating March, 2020 repap

## 2019-02-27 ENCOUNTER — ANNUAL EXAM (OUTPATIENT)
Dept: OBGYN CLINIC | Age: 64
End: 2019-02-27
Payer: COMMERCIAL

## 2019-02-27 VITALS
BODY MASS INDEX: 28.13 KG/M2 | SYSTOLIC BLOOD PRESSURE: 120 MMHG | WEIGHT: 149 LBS | DIASTOLIC BLOOD PRESSURE: 62 MMHG | HEIGHT: 61 IN

## 2019-02-27 DIAGNOSIS — R87.810 CERVICAL HIGH RISK HPV (HUMAN PAPILLOMAVIRUS) TEST POSITIVE: ICD-10-CM

## 2019-02-27 DIAGNOSIS — Z01.419 ENCOUNTER FOR GYNECOLOGICAL EXAMINATION WITHOUT ABNORMAL FINDING: Primary | ICD-10-CM

## 2019-02-27 DIAGNOSIS — Z12.31 ENCOUNTER FOR SCREENING MAMMOGRAM FOR MALIGNANT NEOPLASM OF BREAST: ICD-10-CM

## 2019-02-27 PROCEDURE — 87624 HPV HI-RISK TYP POOLED RSLT: CPT | Performed by: NURSE PRACTITIONER

## 2019-02-27 PROCEDURE — G0145 SCR C/V CYTO,THINLAYER,RESCR: HCPCS | Performed by: NURSE PRACTITIONER

## 2019-02-27 PROCEDURE — 99396 PREV VISIT EST AGE 40-64: CPT | Performed by: NURSE PRACTITIONER

## 2019-02-27 NOTE — PATIENT INSTRUCTIONS
HPV (Human Papillomavirus)   WHAT YOU NEED TO KNOW:   What is human papillomavirus (HPV)? HPV is the most common infection spread by sexual contact  It can also be spread from a mother to her baby during delivery  HPV may cause oral and genital warts or tumors in your nose, mouth, throat, and lungs  HPV may also cause vaginal, penile, and anal cancers  You may not show symptoms of any of these conditions for several years after being exposed to HPV  What are the symptoms of HPV? · Painless warts    · Genital or anal discharge, bleeding, itching, or pain    · Pain when you urinate  How is HPV diagnosed and treated? Your healthcare provider may use a vinegar liquid to help diagnose HPV genital warts  He or she may take tissue samples to test for HPV infection  There is no cure for HPV  There is treatment for the conditions that are caused by HPV  You will need to be closely monitored for these conditions  Ask your healthcare provider for more information about monitoring, conditions caused by HPV, and treatments  How can HPV infection be prevented? Vaccinations can help stop the spread of HPV  The vaccine is most effective if given before sexual activity begins  This allows your body to build almost complete protection against HPV before having contact with the virus  The HPV vaccine is still effective up to the age of 32 if it is given after sexual activity has already begun  Who should get the HPV vaccine? The first dose of the vaccine may be given as early as 5years of age  The following should also get the vaccine:  · All females and males 6to 15years of age    · Females 15 through 32years of age, and males 15 through 24years of age, who have not been vaccinated     · Men up to 32years of age who have sex with other men, and have not been vaccinated     · Anyone with a weak immune system, including infection with HIV, up to 32years of age  Who should not get the vaccine or should wait to get it? Do not get a second dose if you had a severe allergic reaction to the first dose  Do not get the vaccine if you are pregnant  If you are sick, wait to get the vaccine until symptoms go away  How is the vaccine given? The HPV vaccine can be given with other vaccinations  The vaccine is given in 3 doses to adults:  · The first dose  is given at any time  · The second dose  is given 1 to 2 months after the first dose  · The third dose  is given 6 months after the first dose  What else do I need to know about how the vaccine is given? You may need 1 more dose of the HPV vaccine if any of the following is true:  · You only received 1 dose of the HPV vaccine before 13years of age    · You received 2 doses of the HPV vaccine less than 5 months apart before 13years of age  When should I contact my healthcare provider? · You have warts in your genital or anal area  · You have genital or anal discharge, bleeding, itching, or pain  · You have pain when you urinate  · You have questions or concerns about your condition or care  CARE AGREEMENT:   You have the right to help plan your care  Learn about your health condition and how it may be treated  Discuss treatment options with your caregivers to decide what care you want to receive  You always have the right to refuse treatment  The above information is an  only  It is not intended as medical advice for individual conditions or treatments  Talk to your doctor, nurse or pharmacist before following any medical regimen to see if it is safe and effective for you  © 2017 [address] [employer]  Information is for End User's use only and may not be sold, redistributed or otherwise used for commercial purposes  All illustrations and images included in CareNotes® are the copyrighted property of [employer]  or Clayton Kim

## 2019-02-27 NOTE — PROGRESS NOTES
Diagnoses and all orders for this visit:    Encounter for gynecological examination without abnormal finding  -     Liquid-based pap, screening    Cervical high risk HPV (human papillomavirus) test positive    Encounter for screening mammogram for malignant neoplasm of breast  -     Mammo screening bilateral w 3d & cad; Future        Call as needed, encouraged calcium/vit D in her diet, call with any PMB, all questions answered        Pleasant 59 y o  postmenopausal female here for annual exam  She denies postmenopausal bleeding  +history of HPV+, last colpo 2018 nml, pap today  Denies vaginal issues  Denies pelvic pain  Denies postmenopausal issues  Sexually active  Colonoscopy due 2022  Past Medical History:   Diagnosis Date    Ovarian cyst     Postmenopausal atrophic vaginitis     Uterine prolapse      Past Surgical History:   Procedure Laterality Date    COLONOSCOPY  2014    PARTIAL HYSTERECTOMY      supracervical    [place] COLONOSCOPY FLX DX W/COLLJ SPEC WHEN PFRMD N/A 2/20/2019    Procedure: COLONOSCOPY;  Surgeon: Kaylen Dao MD;  Location: [facility];   Service: Gastroenterology     Family History   Problem Relation Age of Onset    No Known Problems Mother     No Known Problems Father     Breast cancer Sister     Diabetes Family     Hypertension Family     Diabetes Other     Hypertension Other     Colon cancer Neg Hx     Ovarian cancer Neg Hx     Uterine cancer Neg Hx     Cervical cancer Neg Hx      Social History     Tobacco Use    Smoking status: Never Smoker    Smokeless tobacco: Never Used   Substance Use Topics    Alcohol use: No    Drug use: No       Current Outpatient Medications:     aspirin 81 mg chewable tablet, Chew 81 mg daily, Disp: , Rfl:     calcium carbonate (OS-VIELKA) 600 MG tablet, Take 600 mg by mouth 2 (two) times a day with meals, Disp: , Rfl:     cholecalciferol (VITAMIN D3) 1,000 units tablet, Take 1,000 Units by mouth daily, Disp: , Rfl:     [employer] OMEGA-3 KRILL  MG CAPS, Take by mouth, Disp: , Rfl:     Multiple Vitamin (MULTIVITAMIN) capsule, Take 1 capsule by mouth daily, Disp: , Rfl:     promethazine-codeine (PHENERGAN WITH CODEINE) 6 25-10 mg/5 mL syrup, Take 5 mL by mouth every 4 (four) hours as needed for cough (Patient not taking: Reported on 2019), Disp: 473 mL, Rfl: 1    triamcinolone (KENALOG) 0 1 % cream, Apply topically 2 (two) times a day for 28 days (Patient not taking: Reported on 2019), Disp: 30 g, Rfl: 0  Patient Active Problem List    Diagnosis Date Noted    Encounter for gynecological examination without abnormal finding 2019    Encounter for screening mammogram for malignant neoplasm of breast 2019    Screen for colon cancer 02/15/2019    Overweight (BMI 25 0-29 9) 02/15/2018    Hypercholesterolemia 02/15/2018    Prediabetes 02/15/2018    Cervical high risk HPV (human papillomavirus) test positive 08/10/2017       No Known Allergies    OB History    Para Term  AB Living   1 1 1     1   SAB TAB Ectopic Multiple Live Births           1      # Outcome Date GA Lbr Piero/2nd Weight Sex Delivery Anes PTL Lv   1 Term              Son has 4 grandchildren, oldest is 32 and youngest is 15  Resigned from [address] [facility] 68:    19 0950   BP: 120/62   BP Location: Right arm   Patient Position: Sitting   Weight: 67 6 kg (149 lb)   Height: 5' 1" (1 549 m)     Body mass index is 28 15 kg/m²  Review of Systems   Constitutional: Negative for chills, fatigue, fever and unexpected weight change  Respiratory: Negative for shortness of breath  Gastrointestinal: Negative for anal bleeding, blood in stool, constipation and diarrhea  Genitourinary: Negative for difficulty urinating, dysuria and hematuria  Physical Exam   Constitutional: She appears well-developed and well-nourished  No distress  HENT:   Head: Normocephalic  Neck: Normal range of motion  Neck supple  Pulmonary: Effort normal   Breasts: bilateral without masses, skin changes or nipple discharge  Bilaterally soft and warm to touch  No areas of erythema or pain  Abdominal: Soft  Pelvic exam was performed with patient supine  No labial fusion  There is no rash, tenderness, lesion or injury on the right labia  There is no rash, tenderness, lesion or injury on the left labia  Uterus is surgically absent  Cervix exhibits no motion tenderness, no discharge and no friability  Right adnexum displays no mass, no tenderness and no fullness  Left adnexum displays no mass, no tenderness and no fullness  No erythema or tenderness in the vagina  No foreign body in the vagina  No signs of injury around the vagina  No vaginal discharge found  Lymphadenopathy:        Right: No inguinal adenopathy present  Left: No inguinal adenopathy present

## 2019-02-28 ENCOUNTER — HOSPITAL ENCOUNTER (OUTPATIENT)
Dept: MAMMOGRAPHY | Facility: CLINIC | Age: 64
Discharge: HOME | End: 2019-02-28
Payer: COMMERCIAL

## 2019-02-28 VITALS — BODY MASS INDEX: 27.75 KG/M2 | HEIGHT: 61 IN | WEIGHT: 147 LBS

## 2019-02-28 DIAGNOSIS — Z12.31 ENCOUNTER FOR SCREENING MAMMOGRAM FOR MALIGNANT NEOPLASM OF BREAST: ICD-10-CM

## 2019-02-28 LAB
HPV HR 12 DNA CVX QL NAA+PROBE: POSITIVE
HPV16 DNA CVX QL NAA+PROBE: NEGATIVE
HPV18 DNA CVX QL NAA+PROBE: NEGATIVE

## 2019-02-28 PROCEDURE — 77063 BREAST TOMOSYNTHESIS BI: CPT

## 2019-02-28 PROCEDURE — 77067 SCR MAMMO BI INCL CAD: CPT

## 2019-03-04 ENCOUNTER — TELEPHONE (OUTPATIENT)
Dept: OBGYN CLINIC | Facility: CLINIC | Age: 64
End: 2019-03-04

## 2019-03-04 LAB
LAB AP GYN PRIMARY INTERPRETATION: NORMAL
Lab: NORMAL

## 2019-03-04 NOTE — TELEPHONE ENCOUNTER
----- Message from [facility] sent at 3/4/2019  2:14 PM EST -----  Pls notify pt pap was nml but HPV positive (non 16/18)  Based on the guidelines we can repeat her pap and hpv in 1 yr

## 2020-02-15 ENCOUNTER — TRANSCRIBE ORDERS (OUTPATIENT)
Dept: ADMINISTRATIVE | Facility: HOSPITAL | Age: 65
End: 2020-02-15

## 2020-02-15 DIAGNOSIS — Z12.31 ENCOUNTER FOR SCREENING MAMMOGRAM FOR MALIGNANT NEOPLASM OF BREAST: Primary | ICD-10-CM

## 2020-03-05 ENCOUNTER — ANNUAL EXAM (OUTPATIENT)
Dept: OBGYN CLINIC | Facility: CLINIC | Age: 65
End: 2020-03-05
Payer: MEDICARE

## 2020-03-05 DIAGNOSIS — Z01.419 ENCOUNTER FOR GYNECOLOGICAL EXAMINATION WITHOUT ABNORMAL FINDING: ICD-10-CM

## 2020-03-05 DIAGNOSIS — Z12.31 ENCOUNTER FOR SCREENING MAMMOGRAM FOR MALIGNANT NEOPLASM OF BREAST: ICD-10-CM

## 2020-03-05 DIAGNOSIS — R87.810 CERVICAL HIGH RISK HPV (HUMAN PAPILLOMAVIRUS) TEST POSITIVE: ICD-10-CM

## 2020-03-05 DIAGNOSIS — Z13.820 SCREENING FOR OSTEOPOROSIS: Primary | ICD-10-CM

## 2020-03-05 PROCEDURE — G0101 CA SCREEN;PELVIC/BREAST EXAM: HCPCS | Performed by: NURSE PRACTITIONER

## 2020-03-06 ENCOUNTER — HOSPITAL ENCOUNTER (OUTPATIENT)
Dept: MAMMOGRAPHY | Facility: CLINIC | Age: 65
Discharge: HOME | End: 2020-03-06
Payer: MEDICARE

## 2020-03-06 VITALS — BODY MASS INDEX: 27.75 KG/M2 | WEIGHT: 147 LBS | HEIGHT: 61 IN

## 2020-03-06 DIAGNOSIS — Z12.31 ENCOUNTER FOR SCREENING MAMMOGRAM FOR MALIGNANT NEOPLASM OF BREAST: ICD-10-CM

## 2020-03-06 PROCEDURE — 77067 SCR MAMMO BI INCL CAD: CPT

## 2020-03-06 PROCEDURE — 77063 BREAST TOMOSYNTHESIS BI: CPT

## 2020-03-06 PROCEDURE — 87624 HPV HI-RISK TYP POOLED RSLT: CPT | Performed by: NURSE PRACTITIONER

## 2020-03-06 PROCEDURE — G0145 SCR C/V CYTO,THINLAYER,RESCR: HCPCS | Performed by: NURSE PRACTITIONER

## 2020-03-08 VITALS
BODY MASS INDEX: 29.83 KG/M2 | SYSTOLIC BLOOD PRESSURE: 122 MMHG | HEIGHT: 61 IN | DIASTOLIC BLOOD PRESSURE: 78 MMHG | WEIGHT: 158 LBS

## 2020-03-08 PROBLEM — Z12.11 SCREEN FOR COLON CANCER: Status: RESOLVED | Noted: 2019-02-15 | Resolved: 2020-03-08

## 2020-03-08 NOTE — PROGRESS NOTES
Diagnoses and all orders for this visit:    Screening for osteoporosis  -     DXA bone density spine hip and pelvis; Future    Encounter for gynecological examination without abnormal finding  -     Liquid-based pap, screening    Encounter for screening mammogram for malignant neoplasm of breast  -     Mammo screening bilateral w 3d & cad; Future    Cervical high risk HPV (human papillomavirus) test positive        Call as needed, encouraged calcium/vit D in her diet, call with any PMB, all questions answered        Pleasant 72 y o  postmenopausal female here for annual exam  She denies postmenopausal bleeding  +history of HPV+ 2018 AND 2019, last colpo 2018 nml, pap today  Denies vaginal issues  Denies pelvic pain  Denies postmenopausal issues  Sexually active  Colonoscopy due 2024  Past Medical History:   Diagnosis Date    Ovarian cyst     Postmenopausal atrophic vaginitis     Uterine prolapse      Past Surgical History:   Procedure Laterality Date    BREAST BIOPSY Left     benign- does not recall when    COLONOSCOPY  2014    HYSTERECTOMY  1986    PARTIAL HYSTERECTOMY      supracervical    [place] COLONOSCOPY FLX DX W/COLLJ SPEC WHEN PFRMD N/A 2/20/2019    Procedure: COLONOSCOPY;  Surgeon: Cuco Carrillo MD;  Location: [facility];   Service: Gastroenterology     Family History   Problem Relation Age of Onset    No Known Problems Mother     No Known Problems Father     Breast cancer Sister 48    Diabetes Family     Hypertension Family     Diabetes Other     Hypertension Other     Colon cancer Neg Hx     Ovarian cancer Neg Hx     Uterine cancer Neg Hx     Cervical cancer Neg Hx      Social History     Tobacco Use    Smoking status: Never Smoker    Smokeless tobacco: Never Used   Substance Use Topics    Alcohol use: No    Drug use: No       Current Outpatient Medications:     aspirin 81 mg chewable tablet, Chew 81 mg daily, Disp: , Rfl:     calcium carbonate (OS-VIELKA) 600 MG tablet, Take 600 mg by mouth 2 (two) times a day with meals, Disp: , Rfl:     cholecalciferol (VITAMIN D3) 1,000 units tablet, Take 1,000 Units by mouth daily, Disp: , Rfl:     [employer] OMEGA-3 KRILL  MG CAPS, Take by mouth, Disp: , Rfl:     Multiple Vitamin (MULTIVITAMIN) capsule, Take 1 capsule by mouth daily, Disp: , Rfl:     promethazine-codeine (PHENERGAN WITH CODEINE) 6 25-10 mg/5 mL syrup, Take 5 mL by mouth every 4 (four) hours as needed for cough (Patient not taking: Reported on 2019), Disp: 473 mL, Rfl: 1    triamcinolone (KENALOG) 0 1 % cream, Apply topically 2 (two) times a day for 28 days (Patient not taking: Reported on 2019), Disp: 30 g, Rfl: 0  Patient Active Problem List    Diagnosis Date Noted    Encounter for gynecological examination without abnormal finding 2019    Encounter for screening mammogram for malignant neoplasm of breast 2019    Overweight (BMI 25 0-29 9) 02/15/2018    Hypercholesterolemia 02/15/2018    Prediabetes 02/15/2018    Cervical high risk HPV (human papillomavirus) test positive 08/10/2017       No Known Allergies    OB History    Para Term  AB Living   1 1 1     1   SAB TAB Ectopic Multiple Live Births           1      # Outcome Date GA Lbr Piero/2nd Weight Sex Delivery Anes PTL Lv   1 Term              Son has 4 grandchildren, oldest is 32 and youngest is 15  Resigned from [address] [facility] 68:    20 1310   BP: 122/78   Weight: 71 7 kg (158 lb)   Height: 5' 1" (1 549 m)     Body mass index is 29 85 kg/m²  Review of Systems   Constitutional: Negative for chills, fatigue, fever and unexpected weight change  Respiratory: Negative for shortness of breath  Gastrointestinal: Negative for anal bleeding, blood in stool, constipation and diarrhea  Genitourinary: Negative for difficulty urinating, dysuria and hematuria  Physical Exam   Constitutional: She appears well-developed and well-nourished   No distress  HENT:   Head: Normocephalic  Neck: Normal range of motion  Neck supple  Pulmonary: Effort normal   Breasts: bilateral without masses, skin changes or nipple discharge  Bilaterally soft and warm to touch  No areas of erythema or pain  Abdominal: Soft  Pelvic exam was performed with patient supine  No labial fusion  There is no rash, tenderness, lesion or injury on the right labia  There is no rash, tenderness, lesion or injury on the left labia  Uterus is surgically absent  Cervix exhibits no motion tenderness, no discharge and no friability  Right adnexum displays no mass, no tenderness and no fullness  Left adnexum displays no mass, no tenderness and no fullness  No erythema or tenderness in the vagina  No foreign body in the vagina  No signs of injury around the vagina  No vaginal discharge found  Lymphadenopathy:        Right: No inguinal adenopathy present  Left: No inguinal adenopathy present

## 2020-03-11 LAB
LAB AP GYN PRIMARY INTERPRETATION: NORMAL
Lab: NORMAL

## 2020-03-17 ENCOUNTER — TELEPHONE (OUTPATIENT)
Dept: OBGYN CLINIC | Facility: CLINIC | Age: 65
End: 2020-03-17

## 2020-03-17 NOTE — TELEPHONE ENCOUNTER
----- Message from Stefan Garcia, [address] sent at 3/17/2020  9:02 AM EDT -----  Please arrange for repeat colposcopy, her pap was NORMAL but  HPV + for a second yr in a row  Thanks

## 2020-03-17 NOTE — TELEPHONE ENCOUNTER
Spoke with patient, discussed results and recommendations   Colpo scheduled for 3/18/20 at 11:20am, emailed appt information to patient, as per her request

## 2020-03-18 ENCOUNTER — PROCEDURE VISIT (OUTPATIENT)
Dept: OBGYN CLINIC | Facility: CLINIC | Age: 65
End: 2020-03-18
Payer: MEDICARE

## 2020-03-18 VITALS — DIASTOLIC BLOOD PRESSURE: 78 MMHG | WEIGHT: 155 LBS | BODY MASS INDEX: 29.29 KG/M2 | SYSTOLIC BLOOD PRESSURE: 148 MMHG

## 2020-03-18 DIAGNOSIS — R87.810 CERVICAL HIGH RISK HPV (HUMAN PAPILLOMAVIRUS) TEST POSITIVE: Primary | ICD-10-CM

## 2020-03-18 PROCEDURE — 88305 TISSUE EXAM BY PATHOLOGIST: CPT | Performed by: PATHOLOGY

## 2020-03-18 PROCEDURE — 57454 BX/CURETT OF CERVIX W/SCOPE: CPT | Performed by: NURSE PRACTITIONER

## 2020-03-18 NOTE — PROGRESS NOTES
Colposcopy  Date/Time: 3/18/2020 11:38 AM  Performed by: ANGELA Barksdale  Authorized by: ANGELA Barksdale     Consent:     Consent obtained:  Written    Consent given by:  Patient    Procedural risks discussed:  Bleeding, infection, possible continued pain and repeat procedure    Patient questions answered: yes      Patient agrees, verbalizes understanding, and wants to proceed: yes      Educational handouts given: yes      Instructions and paperwork completed: yes    Universal protocol:     Patient states understanding of procedure being performed: yes      Relevant documents present and verified: yes      Site marked: yes    Pre-procedure:     Pre-procedure timeout performed: yes      Prepped with: acetic acid and Lugol    Indication:     Indications: HPV + non 16/18  Procedure:     Procedure: Colposcopy w/ cervical biopsy and ECC      Under satisfactory analgesia the patient was prepped and draped in the dorsal lithotomy position: yes      [place] speculum was placed in the vagina: yes      Under colposcopic examination the transition zone was seen in entirety: yes      Intracervical block was performed: no      Endocervix was curetted using a Kevorkian curette: yes      Cervical biopsy performed with a cervical biopsy punch: yes      Tampon inserted: no      Monsel's solution was applied: yes      Biopsy(s): yes      Location:  12 and 3    Specimen to pathology: yes    Post-procedure:     Findings: White epithelium      Impression: Low grade cervical dysplasia      Patient tolerance of procedure: Tolerated well, no immediate complications  Comments:      Repap in 1 yr if biopsies are low level abnormalities  All questions answered  Post procedure handout given  Briefly discussed LEEP for next yr if HPV persists

## 2020-03-20 ENCOUNTER — TELEPHONE (OUTPATIENT)
Dept: OBGYN CLINIC | Facility: CLINIC | Age: 65
End: 2020-03-20

## 2020-03-20 NOTE — TELEPHONE ENCOUNTER
----- Message from Lesly Mayberry sent at 3/20/2020  1:39 PM EDT -----  Please let patient know her colposcopy biopsy results were low level abnmlities so we can repeat pap smear in 1 year  Thanks

## 2020-03-20 NOTE — TELEPHONE ENCOUNTER
----- Message from Cristina Zavala, Lesly Stahl sent at 3/20/2020  1:39 PM EDT -----  Please let patient know her colposcopy biopsy results were low level abnmlities so we can repeat pap smear in 1 year  Thanks

## 2020-03-23 NOTE — TELEPHONE ENCOUNTER
Pt contacted # [phone] vm- per hippa communication consent on file- lmom advising as directed pt may call to schedule now for one year by calling us at [phone]

## 2020-06-03 ENCOUNTER — TELEPHONE (OUTPATIENT)
Dept: OBGYN CLINIC | Facility: CLINIC | Age: 65
End: 2020-06-03

## 2020-07-21 ENCOUNTER — APPOINTMENT (OUTPATIENT)
Dept: LAB | Facility: CLINIC | Age: 65
End: 2020-07-21
Payer: MEDICARE

## 2020-07-21 ENCOUNTER — OFFICE VISIT (OUTPATIENT)
Dept: INTERNAL MEDICINE CLINIC | Facility: CLINIC | Age: 65
End: 2020-07-21
Payer: MEDICARE

## 2020-07-21 VITALS
OXYGEN SATURATION: 97 % | RESPIRATION RATE: 14 BRPM | SYSTOLIC BLOOD PRESSURE: 118 MMHG | BODY MASS INDEX: 29.97 KG/M2 | WEIGHT: 158.6 LBS | HEART RATE: 84 BPM | DIASTOLIC BLOOD PRESSURE: 78 MMHG | TEMPERATURE: 98.2 F

## 2020-07-21 DIAGNOSIS — H43.392 VISUAL FLOATERS, LEFT: Primary | ICD-10-CM

## 2020-07-21 DIAGNOSIS — R23.8 ABNORMAL BRUISING: ICD-10-CM

## 2020-07-21 DIAGNOSIS — Z13.29 SCREENING FOR THYROID DISORDER: ICD-10-CM

## 2020-07-21 DIAGNOSIS — R73.03 PREDIABETES: Chronic | ICD-10-CM

## 2020-07-21 DIAGNOSIS — E78.00 HYPERCHOLESTEROLEMIA: Chronic | ICD-10-CM

## 2020-07-21 PROBLEM — R23.3 ABNORMAL BRUISING: Status: ACTIVE | Noted: 2020-07-21

## 2020-07-21 LAB
ALBUMIN SERPL BCP-MCNC: 3.6 G/DL (ref 3.5–5)
ALP SERPL-CCNC: 81 U/L (ref 46–116)
ALT SERPL W P-5'-P-CCNC: 22 U/L (ref 12–78)
ANION GAP SERPL CALCULATED.3IONS-SCNC: 6 MMOL/L (ref 4–13)
AST SERPL W P-5'-P-CCNC: 17 U/L (ref 5–45)
BASOPHILS # BLD AUTO: 0.03 THOUSANDS/ΜL (ref 0–0.1)
BASOPHILS NFR BLD AUTO: 1 % (ref 0–1)
BILIRUB SERPL-MCNC: 0.49 MG/DL (ref 0.2–1)
BUN SERPL-MCNC: 20 MG/DL (ref 5–25)
CALCIUM SERPL-MCNC: 9.1 MG/DL (ref 8.3–10.1)
CHLORIDE SERPL-SCNC: 108 MMOL/L (ref 100–108)
CHOLEST SERPL-MCNC: 235 MG/DL (ref 50–200)
CO2 SERPL-SCNC: 27 MMOL/L (ref 21–32)
CREAT SERPL-MCNC: 1.03 MG/DL (ref 0.6–1.3)
EOSINOPHIL # BLD AUTO: 0.1 THOUSAND/ΜL (ref 0–0.61)
EOSINOPHIL NFR BLD AUTO: 2 % (ref 0–6)
ERYTHROCYTE [DISTWIDTH] IN BLOOD BY AUTOMATED COUNT: 13 % (ref 11.6–15.1)
EST. AVERAGE GLUCOSE BLD GHB EST-MCNC: 120 MG/DL
GFR SERPL CREATININE-BSD FRML MDRD: 66 ML/MIN/1.73SQ M
GLUCOSE P FAST SERPL-MCNC: 84 MG/DL (ref 65–99)
HBA1C MFR BLD: 5.8 %
HCT VFR BLD AUTO: 38.7 % (ref 34.8–46.1)
HDLC SERPL-MCNC: 66 MG/DL
HGB BLD-MCNC: 12.5 G/DL (ref 11.5–15.4)
IMM GRANULOCYTES # BLD AUTO: 0.01 THOUSAND/UL (ref 0–0.2)
IMM GRANULOCYTES NFR BLD AUTO: 0 % (ref 0–2)
INR PPP: 0.98 (ref 0.84–1.19)
LDLC SERPL CALC-MCNC: 155 MG/DL (ref 0–100)
LYMPHOCYTES # BLD AUTO: 1.91 THOUSANDS/ΜL (ref 0.6–4.47)
LYMPHOCYTES NFR BLD AUTO: 41 % (ref 14–44)
MCH RBC QN AUTO: 30.6 PG (ref 26.8–34.3)
MCHC RBC AUTO-ENTMCNC: 32.3 G/DL (ref 31.4–37.4)
MCV RBC AUTO: 95 FL (ref 82–98)
MONOCYTES # BLD AUTO: 0.46 THOUSAND/ΜL (ref 0.17–1.22)
MONOCYTES NFR BLD AUTO: 10 % (ref 4–12)
NEUTROPHILS # BLD AUTO: 2.17 THOUSANDS/ΜL (ref 1.85–7.62)
NEUTS SEG NFR BLD AUTO: 46 % (ref 43–75)
NONHDLC SERPL-MCNC: 169 MG/DL
NRBC BLD AUTO-RTO: 0 /100 WBCS
PLATELET # BLD AUTO: 344 THOUSANDS/UL (ref 149–390)
PMV BLD AUTO: 10.3 FL (ref 8.9–12.7)
POTASSIUM SERPL-SCNC: 4 MMOL/L (ref 3.5–5.3)
PROT SERPL-MCNC: 7.2 G/DL (ref 6.4–8.2)
PROTHROMBIN TIME: 13 SECONDS (ref 11.6–14.5)
RBC # BLD AUTO: 4.08 MILLION/UL (ref 3.81–5.12)
SODIUM SERPL-SCNC: 141 MMOL/L (ref 136–145)
TRIGL SERPL-MCNC: 70 MG/DL
TSH SERPL DL<=0.05 MIU/L-ACNC: 1.01 UIU/ML (ref 0.36–3.74)
WBC # BLD AUTO: 4.68 THOUSAND/UL (ref 4.31–10.16)

## 2020-07-21 PROCEDURE — 99214 OFFICE O/P EST MOD 30 MIN: CPT | Performed by: NURSE PRACTITIONER

## 2020-07-21 PROCEDURE — 80061 LIPID PANEL: CPT

## 2020-07-21 PROCEDURE — 80053 COMPREHEN METABOLIC PANEL: CPT

## 2020-07-21 PROCEDURE — 83036 HEMOGLOBIN GLYCOSYLATED A1C: CPT

## 2020-07-21 PROCEDURE — 85610 PROTHROMBIN TIME: CPT

## 2020-07-21 PROCEDURE — 84443 ASSAY THYROID STIM HORMONE: CPT

## 2020-07-21 PROCEDURE — 36415 COLL VENOUS BLD VENIPUNCTURE: CPT

## 2020-07-21 PROCEDURE — 85025 COMPLETE CBC W/AUTO DIFF WBC: CPT

## 2020-07-21 PROCEDURE — 1036F TOBACCO NON-USER: CPT | Performed by: NURSE PRACTITIONER

## 2020-07-21 NOTE — ASSESSMENT & PLAN NOTE
Patient has complaints of floaters in the left eye which began approximately 2 weeks ago  Patient states that she has not experienced this in the past   Has no history of hypertension, diabetes, or extremely elevated cholesterol levels  Referral placed for optometry consult

## 2020-07-21 NOTE — PATIENT INSTRUCTIONS
Visual Floaters   WHAT YOU NEED TO KNOW:   What are visual floaters? Floaters are specks that appear to move around in your field of vision  They are dark and shaped like spots or cobwebs  If you try to look straight at them, they seem to dart away  What causes visual floaters? Floaters may be caused by tiny particles of debris or blood that float in the vitreous  The vitreous is the gel that fills most of your eye  As you age, the vitreous shrinks and pulls away from the retina  The retina is the thin layer that lines the back of your eye  When this happens, the tissue becomes stringy and casts a shadow on the retina  These shadows can cause you to see floaters  Floaters may also be caused by problems with the retina  What increases my risk of visual floaters? · Aging     · Nearsightedness (trouble seeing things far away)     · Inflammation inside the eye, sometimes caused by infection     · Medical conditions, such as diabetes or high blood pressure     · Eye injuries or cataract surgery     · Problems with the retina  How are visual floaters diagnosed? Your healthcare provider will examine your eyes and check your vision  He will ask you about your overall health, any eye conditions or injuries, and if you are nearsighted  A slit-lamp exam may be done  This exam uses a microscope with a strong light to check inside your eye  How are visual floaters treated? Most floaters are not harmful and do not require treatment  Surgery may be needed if the floaters are so bad that you cannot see well  How can I protect my eyes? · Get annual eye exams  Your ophthalmologist or optometrist will examine your eyes and test your vision  · Wear a hat and sunglasses  with ultraviolet (UV) protection lenses to protect your eyes when you are outdoors  · Manage your health conditions    See your healthcare provider regularly if you have a health condition that may affect your vision, such as diabetes or high blood pressure  When should I contact my healthcare provider? · You start to see more floaters over time  · You have eye pain or blurred vision, or light hurts your eyes  · You have questions or concerns about your care  When should I seek immediate care or call 911? · You suddenly see more floaters, or flashing lights  · You have a gradual or sudden loss of vision, or a change in your vision  CARE AGREEMENT:   You have the right to help plan your care  Learn about your health condition and how it may be treated  Discuss treatment options with your caregivers to decide what care you want to receive  You always have the right to refuse treatment  The above information is an  only  It is not intended as medical advice for individual conditions or treatments  Talk to your doctor, nurse or pharmacist before following any medical regimen to see if it is safe and effective for you  © 2017 [address] Information is for End User's use only and may not be sold, redistributed or otherwise used for commercial purposes  All illustrations and images included in CareNotes® are the copyrighted property of A D [employer] M [employer]  or [employer] Kim

## 2020-07-21 NOTE — ASSESSMENT & PLAN NOTE
Patient complains of what appears to be bruises on her lower extremities  There is 1 healing bruise of the right lower leg and 1 new a bruise on the left thigh  Patient denies trauma however these look typical of a normal bruise reaction after slight trauma  Patient denies any abnormal bruising or bleeding in the past   Will order CBC and PT INR

## 2020-07-21 NOTE — PROGRESS NOTES
INTERNAL MEDICINE FOLLOW-UP OFFICE VISIT  [facility] - [facility] [address] [place] [address]    NAME: Montez Bryant  AGE: 72 y o  SEX: female    DATE OF ENCOUNTER: 7/21/2020   Assessment and Plan:     Problem List Items Addressed This Visit        Other    Hypercholesterolemia (Chronic)     Last blood work was completed in 2018  Will repeat lipid panel  Relevant Orders    CBC and differential    Comprehensive metabolic panel    Lipid panel    HEMOGLOBIN A1C W/ EAG ESTIMATION    TSH, 3rd generation with Free T4 reflex    Prediabetes (Chronic)    Relevant Orders    Comprehensive metabolic panel    HEMOGLOBIN A1C W/ EAG ESTIMATION    Visual floaters, left - Primary     Patient has complaints of floaters in the left eye which began approximately 2 weeks ago  Patient states that she has not experienced this in the past   Has no history of hypertension, diabetes, or extremely elevated cholesterol levels  Referral placed for optometry consult  Relevant Orders    Ambulatory referral to Optometry    Abnormal bruising     Patient complains of what appears to be bruises on her lower extremities  There is 1 healing bruise of the right lower leg and 1 new a bruise on the left thigh  Patient denies trauma however these look typical of a normal bruise reaction after slight trauma  Patient denies any abnormal bruising or bleeding in the past   Will order CBC and PT INR  Relevant Orders    Protime-INR      Other Visit Diagnoses     Screening for thyroid disorder        Relevant Orders    TSH, 3rd generation with Free T4 reflex          No follow-ups on file  Counseling:     · Medication Side Effects - Adverse side effects of medications were reviewed with the patient/guardian today: Yes  · Counseling was given regarding: Risks and benefits of tx options, Intructions for management, Patient and family education and Importance of tx compliance  · Barriers to treatment include:  No identified barriers      Chief Complaint:     Chief Complaint   Patient presents with    Physical Exam    Eye Problem     floaters        History of Present Illness:     Patient here for routine yearly physical exam   No changes in medical history since last seen  Patient has complaints of floaters which she has noted in her left eye over the past 2 weeks  Patient states that she has never experienced this in the past   Denies any history of high blood pressure, diabetes, and has never been on cholesterol medications  Patient does have over-the-counter reading glasses for as needed otherwise denies any other vision abnormalities  Patient states that the floaters are more annoying than anything  She is still able to drive and it does not cause any dizziness  Patient states she has not seen an eye doctor in approximately 3 years  Patient also has complaints of abnormal blotches on her skin  She states this has been occurring for approximately 3 years and she went to see a specialist in [place] for this concern  Patient states that she was told there is nothing that can be done for condition on less she showed up when the lesion 1st appeared  Patient has wound healing spots on the right lower extremity and 1 spot on the left inner thigh which appear to be bruises  Patient denies any trauma to the areas  Denies any other discoloration areas to the upper extremities or torso area  Patient is overall healthy just takes over-the-counter supplements  The following portions of the patient's history were reviewed and updated as appropriate: allergies, current medications, past family history, past medical history, past social history, past surgical history and problem list      Review of Systems:     Review of Systems   Constitutional: Negative for chills, fatigue and fever  HENT: Positive for tinnitus  Negative for congestion, postnasal drip and sinus pressure      Respiratory: Negative for chest tightness and shortness of breath  Cardiovascular: Negative for chest pain, palpitations and leg swelling  Gastrointestinal: Negative for abdominal pain, constipation, diarrhea, nausea and vomiting  Musculoskeletal: Negative for arthralgias and myalgias  Skin: Negative for wound  Neurological: Negative for dizziness and headaches  Hematological: Bruises/bleeds easily  Psychiatric/Behavioral: Negative for dysphoric mood  The patient is not nervous/anxious  Problem List:     Patient Active Problem List   Diagnosis    Cervical high risk HPV (human papillomavirus) test positive    Overweight (BMI 25 0-29  9)    Hypercholesterolemia    Prediabetes    Encounter for gynecological examination without abnormal finding    Encounter for screening mammogram for malignant neoplasm of breast    Visual floaters, left    Abnormal bruising        Objective:     /78 (BP Location: Left arm, Patient Position: Sitting, Cuff Size: Standard)   Pulse 84   Temp 98 2 °F (36 8 °C) (Temporal) Comment: NO NSAIDS  Resp 14   Wt 71 9 kg (158 lb 9 6 oz) Comment: w/ shoes denied off  SpO2 97%   BMI 29 97 kg/m²     Physical Exam   Constitutional: She is oriented to person, place, and time  She appears well-developed and well-nourished  No distress  HENT:   Head: Normocephalic and atraumatic  Right Ear: Hearing, tympanic membrane, external ear and ear canal normal    Left Ear: Hearing, tympanic membrane, external ear and ear canal normal    Nose: Nose normal  No mucosal edema or rhinorrhea  Mouth/Throat: Uvula is midline, oropharynx is clear and moist and mucous membranes are normal  No dental caries  No oropharyngeal exudate  Eyes: Pupils are equal, round, and reactive to light  Conjunctivae, EOM and lids are normal  Right eye exhibits no discharge  Left eye exhibits no discharge  Neck: Trachea normal, normal range of motion and phonation normal  Neck supple  No tracheal deviation present   No thyromegaly present  Cardiovascular: Normal rate, regular rhythm, normal heart sounds, intact distal pulses and normal pulses  No murmur heard  Pulmonary/Chest: Effort normal and breath sounds normal  No respiratory distress  She has no wheezes  Abdominal: Soft  Normal appearance and bowel sounds are normal  She exhibits no distension  There is no splenomegaly or hepatomegaly  There is no tenderness  Musculoskeletal: Normal range of motion  She exhibits no edema or tenderness  Lymphadenopathy:     She has no cervical adenopathy  Neurological: She is alert and oriented to person, place, and time  No sensory deficit  Skin: Skin is warm, dry and intact  Capillary refill takes less than 2 seconds  No rash noted  She is not diaphoretic  Psychiatric: She has a normal mood and affect  Her speech is normal and behavior is normal  Judgment and thought content normal  Cognition and memory are normal      BMI Counseling: Body mass index is 29 97 kg/m²  The BMI is above normal  Nutrition recommendations include encouraging healthy choices of fruits and vegetables, consuming healthier snacks, increasing intake of lean protein, reducing intake of saturated and trans fat and reducing intake of cholesterol  Exercise recommendations include exercising 3-5 times per week  No pharmacotherapy was ordered  Pertinent Laboratory/Diagnostic Studies:    Laboratory Results: I have personally reviewed the pertinent laboratory results/reports   Radiology/Other Diagnostic Testing Results: I have personally reviewed pertinent reports         Current Medications:     Current Outpatient Medications   Medication Sig Dispense Refill    aspirin 81 mg chewable tablet Chew 81 mg every other day       calcium carbonate (OS-VIELKA) 600 MG tablet Take 600 mg by mouth 2 (two) times a day with meals      cholecalciferol (VITAMIN D3) 1,000 units tablet Take 1,000 Units by mouth daily      [employer] OMEGA-3 KRILL  MG CAPS Take by mouth      Multiple Vitamin (MULTIVITAMIN) capsule Take 1 capsule by mouth daily      promethazine-codeine (PHENERGAN WITH CODEINE) 6 25-10 mg/5 mL syrup Take 5 mL by mouth every 4 (four) hours as needed for cough (Patient not taking: Reported on 2/27/2019) 473 mL 1    triamcinolone (KENALOG) 0 1 % cream Apply topically 2 (two) times a day for 28 days (Patient not taking: Reported on 2/27/2019) 30 g 0     No current facility-administered medications for this visit  There are no Patient Instructions on file for this visit      ANGELA Dumont  [facility]

## 2020-07-22 ENCOUNTER — TELEPHONE (OUTPATIENT)
Dept: INTERNAL MEDICINE CLINIC | Facility: CLINIC | Age: 65
End: 2020-07-22

## 2020-07-22 NOTE — TELEPHONE ENCOUNTER
----- Message from Louisa Terry sent at 7/22/2020  8:16 AM EDT -----  Please let patient know her A1C has improved since last blood work, but her cholesterol and LDL slightly increased  We will continue to monitor this, it can improve with diet and exercise  The blood work for the bruising was normal  It is possible that since she is taking aspirin and fish oil that even a slight bump of the leg or crossing the legs can lead to the bruises on her lower legs because those medications both thin out the blood  Thank you!

## 2021-03-10 DIAGNOSIS — Z23 ENCOUNTER FOR IMMUNIZATION: ICD-10-CM

## 2021-03-26 ENCOUNTER — OFFICE VISIT (OUTPATIENT)
Dept: INTERNAL MEDICINE CLINIC | Facility: CLINIC | Age: 66
End: 2021-03-26
Payer: MEDICARE

## 2021-03-26 VITALS
SYSTOLIC BLOOD PRESSURE: 122 MMHG | HEART RATE: 88 BPM | OXYGEN SATURATION: 98 % | WEIGHT: 161.4 LBS | TEMPERATURE: 98.1 F | HEIGHT: 61 IN | BODY MASS INDEX: 30.47 KG/M2 | DIASTOLIC BLOOD PRESSURE: 82 MMHG

## 2021-03-26 DIAGNOSIS — R73.03 PREDIABETES: Chronic | ICD-10-CM

## 2021-03-26 DIAGNOSIS — Z23 ENCOUNTER FOR IMMUNIZATION: ICD-10-CM

## 2021-03-26 DIAGNOSIS — Z78.0 ASYMPTOMATIC POSTMENOPAUSAL STATE: ICD-10-CM

## 2021-03-26 DIAGNOSIS — R60.0 LOWER EXTREMITY EDEMA: ICD-10-CM

## 2021-03-26 DIAGNOSIS — E78.00 HYPERCHOLESTEROLEMIA: Primary | Chronic | ICD-10-CM

## 2021-03-26 DIAGNOSIS — R06.00 DYSPNEA ON EXERTION: ICD-10-CM

## 2021-03-26 DIAGNOSIS — Z11.59 NEED FOR HEPATITIS C SCREENING TEST: ICD-10-CM

## 2021-03-26 DIAGNOSIS — Z00.00 MEDICARE ANNUAL WELLNESS VISIT, INITIAL: ICD-10-CM

## 2021-03-26 PROCEDURE — 99214 OFFICE O/P EST MOD 30 MIN: CPT | Performed by: INTERNAL MEDICINE

## 2021-03-26 PROCEDURE — G0438 PPPS, INITIAL VISIT: HCPCS | Performed by: INTERNAL MEDICINE

## 2021-03-26 PROCEDURE — 1123F ACP DISCUSS/DSCN MKR DOCD: CPT | Performed by: INTERNAL MEDICINE

## 2021-03-26 PROCEDURE — 90732 PPSV23 VACC 2 YRS+ SUBQ/IM: CPT

## 2021-03-26 PROCEDURE — G0009 ADMIN PNEUMOCOCCAL VACCINE: HCPCS

## 2021-03-26 NOTE — PROGRESS NOTES
[facility] - [facility] [address] [place] [address]    NAME: Randy Rocha  AGE: 77 y o  SEX: female  : 1955     DATE: 3/26/2021     Assessment and Plan:     1  Hypercholesterolemia    The 10-year ASCVD risk score (Yuki Corey et al , 2013) is: 8 7%    Values used to calculate the score:      Age: 77 years      Sex: Female      Is Non- : Yes      Diabetic: No      Tobacco smoker: No      Systolic Blood Pressure: 536 mmHg      Is BP treated: No      HDL Cholesterol: 66 mg/dL      Total Cholesterol: 235 mg/dL     Will check UTD labs  Follow heart healthy diet  - Lipid Panel with Direct LDL reflex; Future  - CBC; Future  - Comprehensive metabolic panel; Future  - TSH, 3rd generation with Free T4 reflex; Future    2  Prediabetes    Most recent A1c was 5 8 % on 2020  Avoid excess carbs and exercise more  - CBC; Future  - Comprehensive metabolic panel; Future  - Hemoglobin A1C; Future  - TSH, 3rd generation with Free T4 reflex; Future    3  Lower extremity edema    Check ECHO stress test and BNP  No significant swelling that I can appreciate  Discussed dietary changes, being more active, and weight loss     - NT-BNP PRO; Future  - Echo stress test w contrast if indicated; Future    4  Dyspnea on exertion    Could just be a byproduct of deconditioning and her needing to exercise more  No pitting edema, orthopnea, PND  Will check ECHO stress test due to her age to evaluate for underlying coronary disease  Her lungs were clear  - Echo stress test w contrast if indicated; Future        Return in about 6 months (around 10/5/2021) for Follow-up  Chief Complaint:     Chief Complaint   Patient presents with    Medicare Wellness Visit    Breathing Problem     heavy    Swelling     legs (both)        History of Present Illness:     Patient presents for follow-up  History of high cholesterol and pre-diabetes      Upset that her weight keeps going up little by little  Notices some mild swelling in her legs at time  No pitting edema  No pain in legs  Also her friend who is a nurse has been worried about her recently with breathing sounding more labored with mild exertion  Patient denies chest tightness or chest pain  No cough  She states only get symptoms with exertion  She does not exercise regularly  Review of Systems:     Review of Systems   Constitutional: Negative for activity change, appetite change and fatigue  Respiratory: Positive for shortness of breath  Negative for apnea, cough, chest tightness and wheezing  Cardiovascular: Positive for leg swelling  Negative for chest pain and palpitations  Gastrointestinal: Negative for abdominal distention, abdominal pain, blood in stool, constipation, diarrhea, nausea and vomiting  Musculoskeletal: Negative for arthralgias, back pain, gait problem, joint swelling and myalgias  Skin: Negative for rash and wound  Neurological: Negative for dizziness, weakness, light-headedness, numbness and headaches  Psychiatric/Behavioral: Negative for behavioral problems, confusion, hallucinations, sleep disturbance and suicidal ideas  The patient is not nervous/anxious  Objective:     /82 (BP Location: Left arm, Patient Position: Sitting, Cuff Size: Standard)   Pulse 88   Temp 98 1 °F (36 7 °C) (Temporal) Comment: NO NSAIDS  Ht 5' 1" (1 549 m)   Wt 73 2 kg (161 lb 6 4 oz) Comment: w/ shoes denied off  SpO2 98%   BMI 30 50 kg/m²     Physical Exam  Vitals signs reviewed  Constitutional:       General: She is not in acute distress  Appearance: She is well-developed  She is not diaphoretic  Eyes:      General: No scleral icterus  Right eye: No discharge  Left eye: No discharge  Conjunctiva/sclera: Conjunctivae normal    Neck:      Musculoskeletal: Neck supple  Thyroid: No thyromegaly  Vascular: No JVD     Cardiovascular:      Rate and Rhythm: Normal rate and regular rhythm  Heart sounds: Normal heart sounds  No murmur  Pulmonary:      Effort: Pulmonary effort is normal  No respiratory distress  Breath sounds: Normal breath sounds  No wheezing or rales  Abdominal:      General: Bowel sounds are normal  There is no distension  Palpations: Abdomen is soft  Tenderness: There is no abdominal tenderness  Musculoskeletal:      Right lower leg: Edema (trace) present  Left lower leg: Edema (trace) present  Lymphadenopathy:      Cervical: No cervical adenopathy  Skin:     General: Skin is warm and dry  Neurological:      Mental Status: She is alert     Psychiatric:         Mood and Affect: Mood normal          Behavior: Behavior normal        Naz Ag,   MEDICAL [id number]

## 2021-03-26 NOTE — PROGRESS NOTES
Assessment and Plan:     1  Medicare annual wellness visit, initial    2  Need for hepatitis C screening test  - Hepatitis C Antibody ([facility]); Future    3  Encounter for immunization  - PNEUMOCOCCAL POLYSACCHARIDE VACCINE 23-VALENT =>1YO SQ IM    4  Asymptomatic postmenopausal state  - DXA bone density spine hip and pelvis; Future     BMI Counseling: Body mass index is 30 5 kg/m²  The BMI is above normal  Nutrition recommendations include decreasing portion sizes, encouraging healthy choices of fruits and vegetables, limiting drinks that contain sugar, moderation in carbohydrate intake and increasing intake of lean protein  Exercise recommendations include exercising 3-5 times per week  Preventive health issues were discussed with patient, and age appropriate screening tests were ordered as noted in patient's After Visit Summary  Personalized health advice and appropriate referrals for health education or preventive services given if needed, as noted in patient's After Visit Summary  History of Present Illness:     Patient presents for Medicare Annual Wellness visit    Patient Care Team:  Dayana Cedeño DO as PCP - General (Internal Medicine)  Ami Samuels MD as Endoscopist     Problem List:     Patient Active Problem List   Diagnosis    Cervical high risk HPV (human papillomavirus) test positive    Overweight (BMI 25 0-29  9)    Hypercholesterolemia    Prediabetes    Encounter for gynecological examination without abnormal finding    Encounter for screening mammogram for malignant neoplasm of breast    Visual floaters, left    Abnormal bruising      Past Medical and Surgical History:     Past Medical History:   Diagnosis Date    Ovarian cyst     Postmenopausal atrophic vaginitis     Uterine prolapse      Past Surgical History:   Procedure Laterality Date    BREAST BIOPSY Left     benign- does not recall when    COLONOSCOPY  2014   Rusty Gillespie HYSTERECTOMY      supracervical    MI COLONOSCOPY FLX DX W/COLLJ SPEC WHEN PFRMD N/A 2/20/2019    Procedure: COLONOSCOPY;  Surgeon: Concepción Rosas MD;  Location: [facility];   Service: Gastroenterology      Family History:     Family History   Problem Relation Age of Onset    No Known Problems Mother     No Known Problems Father     Breast cancer Sister 48    Diabetes Family     Hypertension Family     Diabetes Other     Hypertension Other     Colon cancer Neg Hx     Ovarian cancer Neg Hx     Uterine cancer Neg Hx     Cervical cancer Neg Hx       Social History:     E-Cigarette/Vaping    E-Cigarette Use Never User      E-Cigarette/Vaping Substances    Nicotine No     THC No     CBD No     Flavoring No     Other No     Unknown No      Social History     Socioeconomic History    Marital status: /Civil Union     Spouse name: None    Number of children: None    Years of education: None    Highest education level: None   Occupational History    None   Social Needs    Financial resource strain: None    Food insecurity     Worry: None     Inability: None    Transportation needs     Medical: None     Non-medical: None   Tobacco Use    Smoking status: Never Smoker    Smokeless tobacco: Never Used   Substance and Sexual Activity    Alcohol use: No    Drug use: No    Sexual activity: Yes     Partners: Male     Birth control/protection: Surgical   Lifestyle    Physical activity     Days per week: 0 days     Minutes per session: 0 min    Stress: Not at all   Relationships    Social connections     Talks on phone: None     Gets together: None     Attends Hinduism service: None     Active member of club or organization: None     Attends meetings of clubs or organizations: None     Relationship status: None    Intimate partner violence     Fear of current or ex partner: None     Emotionally abused: None     Physically abused: None     Forced sexual activity: None   Other Topics Concern  None   Social History Narrative    None      Medications and Allergies:     Current Outpatient Medications   Medication Sig Dispense Refill    cholecalciferol (VITAMIN D3) 1,000 units tablet Take 1,000 Units by mouth daily      CVS OMEGA-3 KRILL  MG CAPS Take by mouth daily       Multiple Vitamin (MULTIVITAMIN) capsule Take 1 capsule by mouth daily      aspirin 81 mg chewable tablet Chew 81 mg every other day       calcium carbonate (OS-VIELKA) 600 MG tablet Take 600 mg by mouth 2 (two) times a day with meals      promethazine-codeine (PHENERGAN WITH CODEINE) 6 25-10 mg/5 mL syrup Take 5 mL by mouth every 4 (four) hours as needed for cough (Patient not taking: Reported on 2/27/2019) 473 mL 1    triamcinolone (KENALOG) 0 1 % cream Apply topically 2 (two) times a day for 28 days (Patient not taking: Reported on 2/27/2019) 30 g 0     No current facility-administered medications for this visit  No Known Allergies   Immunizations: There is no immunization history on file for this patient  Health Maintenance:         Topic Date Due    Hepatitis C Screening  Never done    MAMMOGRAM  03/06/2022    Cervical Cancer Screening  03/06/2023    Colonoscopy Surveillance  02/20/2024    Colorectal Cancer Screening  02/20/2029         Topic Date Due    COVID-19 Vaccine (1) Never done    DTaP,Tdap,and Td Vaccines (1 - Tdap) Never done    Pneumococcal Vaccine: 65+ Years (1 of 1 - PPSV23) Never done    Influenza Vaccine (1) Never done      Medicare Health Risk Assessment:     /82 (BP Location: Left arm, Patient Position: Sitting, Cuff Size: Standard)   Pulse 88   Temp 98 1 °F (36 7 °C) (Temporal) Comment: NO NSAIDS  Wt 73 2 kg (161 lb 6 4 oz) Comment: w/ shoes denied off  SpO2 98%   BMI 30 50 kg/m²      Mike Rios is here for her Initial Wellness visit  Health Risk Assessment:   Patient rates overall health as good  Patient feels that their physical health rating is slightly worse   Patient is satisfied with their life  Eyesight was rated as same  Hearing was rated as slightly worse  Patient feels that their emotional and mental health rating is same  Patients states they are sometimes angry  Patient states they are sometimes unusually tired/fatigued  Pain experienced in the last 7 days has been none  Patient states that she has experienced weight loss or gain in last 6 months  Depression Screening:   PHQ-2 Score: 0      Fall Risk Screening: In the past year, patient has experienced: no history of falling in past year      Urinary Incontinence Screening:   Patient has leaked urine accidently in the last six months  Home Safety:  Patient does not have trouble with stairs inside or outside of their home  Patient has working smoke alarms and has working carbon monoxide detector  Home safety hazards include: none  Nutrition:   Current diet is Regular  Medications:   Patient is currently taking over-the-counter supplements  OTC medications include: see medication list  Patient is able to manage medications  Activities of Daily Living (ADLs)/Instrumental Activities of Daily Living (IADLs):   Walk and transfer into and out of bed and chair?: Yes  Dress and groom yourself?: Yes    Bathe or shower yourself?: Yes    Feed yourself?  Yes  Do your laundry/housekeeping?: Yes  Manage your money, pay your bills and track your expenses?: Yes  Make your own meals?: Yes    Do your own shopping?: Yes    Durable Medical Equipment Suppliers  none    Previous Hospitalizations:   Any hospitalizations or ED visits within the last 12 months?: No      Advance Care Planning:   Living will: No    Durable POA for healthcare: No    Advanced directive: No    Five wishes given: Yes      Cognitive Screening:   Provider or family/friend/caregiver concerned regarding cognition?: No    PREVENTIVE SCREENINGS      Cardiovascular Screening:    General: Screening Not Indicated and History Lipid Disorder      Diabetes Screening:     General: Screening Current      Colorectal Cancer Screening:     General: Screening Current      Breast Cancer Screening:     General: Screening Current      Cervical Cancer Screening:    General: Screening Not Indicated      Osteoporosis Screening:    General: Screening Current    Due for: DXA Axial      Abdominal Aortic Aneurysm (AAA) Screening:        General: Screening Not Indicated      Lung Cancer Screening:     General: Screening Not Indicated      Hepatitis C Screening:    General: Risks and Benefits Discussed    Hep C Screening Accepted: Yes      Screening, Brief Intervention, and Referral to Treatment (SBIRT)    Screening  Typical number of drinks in a day: 0  Typical number of drinks in a week: 0  Interpretation: Low risk drinking behavior      AUDIT-C Screenin) How often did you have a drink containing alcohol in the past year? never  2) How many drinks did you have on a typical day when you were drinking in the past year? never  3) How often did you have 6 or more drinks on one occasion in the past year? never    AUDIT-C Score: 0  Interpretation: Score 0-2 (female): Negative screen for alcohol misuse    Single Item Drug Screening:  How often have you used an illegal drug (including marijuana) or a prescription medication for non-medical reasons in the past year? never    Single Item Drug Screen Score: 0  Interpretation: Negative screen for possible drug use disorder      John Martinez, DO

## 2021-03-26 NOTE — PATIENT INSTRUCTIONS
Medicare Preventive Visit Patient Instructions  Thank you for completing your Welcome to Medicare Visit or Medicare Annual Wellness Visit today  Your next wellness visit will be due in one year (3/27/2022)  The screening/preventive services that you may require over the next 5-10 years are detailed below  Some tests may not apply to you based off risk factors and/or age  Screening tests ordered at today's visit but not completed yet may show as past due  Also, please note that scanned in results may not display below  Preventive Screenings:  Service Recommendations Previous Testing/Comments   Colorectal Cancer Screening  * Colonoscopy    * Fecal Occult Blood Test (FOBT)/Fecal Immunochemical Test (FIT)  * Fecal DNA/Cologuard Test  * Flexible Sigmoidoscopy Age: 54-65 years old   Colonoscopy: every 10 years (may be performed more frequently if at higher risk)  OR  FOBT/FIT: every 1 year  OR  Cologuard: every 3 years  OR  Sigmoidoscopy: every 5 years  Screening may be recommended earlier than age 48 if at higher risk for colorectal cancer  Also, an individualized decision between you and your healthcare provider will decide whether screening between the ages of 74-80 would be appropriate  Colonoscopy: 02/20/2019  FOBT/FIT: 04/18/2018  Cologuard: Not on file  Sigmoidoscopy: Not on file    Screening Current     Breast Cancer Screening Age: 36 years old  Frequency: every 1-2 years  Not required if history of left and right mastectomy Mammogram: 03/06/2020    Screening Current   Cervical Cancer Screening Between the ages of 21-29, pap smear recommended once every 3 years  Between the ages of 33-67, can perform pap smear with HPV co-testing every 5 years     Recommendations may differ for women with a history of total hysterectomy, cervical cancer, or abnormal pap smears in past  Pap Smear: 03/06/2020    Screening Not Indicated   Hepatitis C Screening Once for adults born between 1945 and 1965  More frequently in patients at high risk for Hepatitis C Hep C Antibody: Not on file    Risks and Benefits Discussed  Due for Hepatitis C screening   Diabetes Screening 1-2 times per year if you're at risk for diabetes or have pre-diabetes Fasting glucose: 84 mg/dL   A1C: 5 8 %    Screening Current   Cholesterol Screening Once every 5 years if you don't have a lipid disorder  May order more often based on risk factors  Lipid panel: 07/21/2020    Screening Not Indicated  History Lipid Disorder     Other Preventive Screenings Covered by Medicare:  1  Abdominal Aortic Aneurysm (AAA) Screening: covered once if your at risk  You're considered to be at risk if you have a family history of AAA  2  Lung Cancer Screening: covers low dose CT scan once per year if you meet all of the following conditions: (1) Age 50-69; (2) No signs or symptoms of lung cancer; (3) Current smoker or have quit smoking within the last 15 years; (4) You have a tobacco smoking history of at least 30 pack years (packs per day multiplied by number of years you smoked); (5) You get a written order from a healthcare provider  3  Glaucoma Screening: covered annually if you're considered high risk: (1) You have diabetes OR (2) Family history of glaucoma OR (3)  aged 48 and older OR (3)  American aged 72 and older  3  Osteoporosis Screening: covered every 2 years if you meet one of the following conditions: (1) You're estrogen deficient and at risk for osteoporosis based off medical history and other findings; (2) Have a vertebral abnormality; (3) On glucocorticoid therapy for more than 3 months; (4) Have primary hyperparathyroidism; (5) On osteoporosis medications and need to assess response to drug therapy  · Last bone density test (DXA Scan): Not on file  5  HIV Screening: covered annually if you're between the age of 12-76  Also covered annually if you are younger than 13 and older than 72 with risk factors for HIV infection   For pregnant patients, it is covered up to 3 times per pregnancy  Immunizations:  Immunization Recommendations   Influenza Vaccine Annual influenza vaccination during flu season is recommended for all persons aged >= 6 months who do not have contraindications   Pneumococcal Vaccine (Prevnar and Pneumovax)  * Prevnar = PCV13  * Pneumovax = PPSV23   Adults 25-60 years old: 1-3 doses may be recommended based on certain risk factors  Adults 72 years old: Prevnar (PCV13) vaccine recommended followed by Pneumovax (PPSV23) vaccine  If already received PPSV23 since turning 65, then PCV13 recommended at least one year after PPSV23 dose  Hepatitis B Vaccine 3 dose series if at intermediate or high risk (ex: diabetes, end stage renal disease, liver disease)   Tetanus (Td) Vaccine - COST NOT COVERED BY MEDICARE PART B Following completion of primary series, a booster dose should be given every 10 years to maintain immunity against tetanus  Td may also be given as tetanus wound prophylaxis  Tdap Vaccine - COST NOT COVERED BY MEDICARE PART B Recommended at least once for all adults  For pregnant patients, recommended with each pregnancy  Shingles Vaccine (Shingrix) - COST NOT COVERED BY MEDICARE PART B  2 shot series recommended in those aged 48 and above     Health Maintenance Due:      Topic Date Due    Hepatitis C Screening  Never done    MAMMOGRAM  03/06/2022    Cervical Cancer Screening  03/06/2023    Colonoscopy Surveillance  02/20/2024    Colorectal Cancer Screening  02/20/2029     Immunizations Due:      Topic Date Due    COVID-19 Vaccine (1) Never done    DTaP,Tdap,and Td Vaccines (1 - Tdap) Never done    Pneumococcal Vaccine: 65+ Years (1 of 1 - PPSV23) Never done    Influenza Vaccine (1) Never done     Advance Directives   What are advance directives? Advance directives are legal documents that state your wishes and plans for medical care   These plans are made ahead of time in case you lose your ability to make decisions for yourself  Advance directives can apply to any medical decision, such as the treatments you want, and if you want to donate organs  What are the types of advance directives? There are many types of advance directives, and each state has rules about how to use them  You may choose a combination of any of the following:  · Living will: This is a written record of the treatment you want  You can also choose which treatments you do not want, which to limit, and which to stop at a certain time  This includes surgery, medicine, IV fluid, and tube feedings  · Durable power of  for healthcare [facility]): This is a written record that states who you want to make healthcare choices for you when you are unable to make them for yourself  This person, called a proxy, is usually a family member or a friend  You may choose more than 1 proxy  · Do not resuscitate (DNR) order:  A DNR order is used in case your heart stops beating or you stop breathing  It is a request not to have certain forms of treatment, such as CPR  A DNR order may be included in other types of advance directives  · Medical directive: This covers the care that you want if you are in a coma, near death, or unable to make decisions for yourself  You can list the treatments you want for each condition  Treatment may include pain medicine, surgery, blood transfusions, dialysis, IV or tube feedings, and a ventilator (breathing machine)  · Values history: This document has questions about your views, beliefs, and how you feel and think about life  This information can help others choose the care that you would choose  Why are advance directives important? An advance directive helps you control your care  Although spoken wishes may be used, it is better to have your wishes written down  Spoken wishes can be misunderstood, or not followed  Treatments may be given even if you do not want them   An advance directive may make it easier for your family to make difficult choices about your care  Urinary Incontinence   Urinary incontinence (UI)  is when you lose control of your bladder  UI develops because your bladder cannot store or empty urine properly  The 3 most common types of UI are stress incontinence, urge incontinence, or both  Medicines:   · May be given to help strengthen your bladder control  Report any side effects of medication to your healthcare provider  Do pelvic muscle exercises often:  Your pelvic muscles help you stop urinating  Squeeze these muscles tight for 5 seconds, then relax for 5 seconds  Gradually work up to squeezing for 10 seconds  Do 3 sets of 15 repetitions a day, or as directed  This will help strengthen your pelvic muscles and improve bladder control  Train your bladder:  Go to the bathroom at set times, such as every 2 hours, even if you do not feel the urge to go  You can also try to hold your urine when you feel the urge to go  For example, hold your urine for 5 minutes when you feel the urge to go  As that becomes easier, hold your urine for 10 minutes  Self-care:   · Keep a UI record  Write down how often you leak urine and how much you leak  Make a note of what you were doing when you leaked urine  · Drink liquids as directed  You may need to limit the amount of liquid you drink to help control your urine leakage  Do not drink any liquid right before you go to bed  Limit or do not have drinks that contain caffeine or alcohol  · Prevent constipation  Eat a variety of high-fiber foods  Good examples are high-fiber cereals, beans, vegetables, and whole-grain breads  Walking is the best way to trigger your intestines to have a bowel movement  · Exercise regularly and maintain a healthy weight  Weight loss and exercise will decrease pressure on your bladder and help you control your leakage  · Use a catheter as directed  to help empty your bladder   A catheter is a tiny, plastic tube that is put into your bladder to drain your urine  · Go to behavior therapy as directed  Behavior therapy may be used to help you learn to control your urge to urinate  Weight Management   Why it is important to manage your weight:  Being overweight increases your risk of health conditions such as heart disease, high blood pressure, type 2 diabetes, and certain types of cancer  It can also increase your risk for osteoarthritis, sleep apnea, and other respiratory problems  Aim for a slow, steady weight loss  Even a small amount of weight loss can lower your risk of health problems  How to lose weight safely:  A safe and healthy way to lose weight is to eat fewer calories and get regular exercise  You can lose up about 1 pound a week by decreasing the number of calories you eat by 500 calories each day  Healthy meal plan for weight management:  A healthy meal plan includes a variety of foods, contains fewer calories, and helps you stay healthy  A healthy meal plan includes the following:  · Eat whole-grain foods more often  A healthy meal plan should contain fiber  Fiber is the part of grains, fruits, and vegetables that is not broken down by your body  Whole-grain foods are healthy and provide extra fiber in your diet  Some examples of whole-grain foods are whole-wheat breads and pastas, oatmeal, brown rice, and bulgur  · Eat a variety of vegetables every day  Include dark, leafy greens such as spinach, kale, natalie greens, and mustard greens  Eat yellow and orange vegetables such as carrots, sweet potatoes, and winter squash  · Eat a variety of fruits every day  Choose fresh or canned fruit (canned in its own juice or light syrup) instead of juice  Fruit juice has very little or no fiber  · Eat low-fat dairy foods  Drink fat-free (skim) milk or 1% milk  Eat fat-free yogurt and low-fat cottage cheese  Try low-fat cheeses such as mozzarella and other reduced-fat cheeses    · Choose meat and other protein foods that are low in fat  Choose beans or other legumes such as split peas or lentils  Choose fish, skinless poultry (chicken or turkey), or lean cuts of red meat (beef or pork)  Before you cook meat or poultry, cut off any visible fat  · Use less fat and oil  Try baking foods instead of frying them  Add less fat, such as margarine, sour cream, regular salad dressing and mayonnaise to foods  Eat fewer high-fat foods  Some examples of high-fat foods include french fries, doughnuts, ice cream, and cakes  · Eat fewer sweets  Limit foods and drinks that are high in sugar  This includes candy, cookies, regular soda, and sweetened drinks  Exercise:  Exercise at least 30 minutes per day on most days of the week  Some examples of exercise include walking, biking, dancing, and swimming  You can also fit in more physical activity by taking the stairs instead of the elevator or parking farther away from stores  Ask your healthcare provider about the best exercise plan for you  © Copyright [employer] 2018 Information is for End User's use only and may not be sold, redistributed or otherwise used for commercial purposes   All illustrations and images included in CareNotes® are the copyrighted property of [employer]  or [address] [place] [address]

## 2021-03-29 ENCOUNTER — TELEPHONE (OUTPATIENT)
Dept: INTERNAL MEDICINE CLINIC | Facility: CLINIC | Age: 66
End: 2021-03-29

## 2021-03-29 ENCOUNTER — APPOINTMENT (OUTPATIENT)
Dept: LAB | Facility: CLINIC | Age: 66
End: 2021-03-29
Payer: MEDICARE

## 2021-03-29 DIAGNOSIS — E78.00 HYPERCHOLESTEROLEMIA: Chronic | ICD-10-CM

## 2021-03-29 DIAGNOSIS — Z11.59 NEED FOR HEPATITIS C SCREENING TEST: ICD-10-CM

## 2021-03-29 DIAGNOSIS — R60.0 LOWER EXTREMITY EDEMA: ICD-10-CM

## 2021-03-29 DIAGNOSIS — R73.03 PREDIABETES: Chronic | ICD-10-CM

## 2021-03-29 DIAGNOSIS — E78.00 HYPERCHOLESTEROLEMIA: Primary | Chronic | ICD-10-CM

## 2021-03-29 LAB
ALBUMIN SERPL BCP-MCNC: 3.8 G/DL (ref 3.5–5)
ALP SERPL-CCNC: 82 U/L (ref 46–116)
ALT SERPL W P-5'-P-CCNC: 22 U/L (ref 12–78)
ANION GAP SERPL CALCULATED.3IONS-SCNC: 5 MMOL/L (ref 4–13)
AST SERPL W P-5'-P-CCNC: 16 U/L (ref 5–45)
BILIRUB SERPL-MCNC: 0.22 MG/DL (ref 0.2–1)
BUN SERPL-MCNC: 16 MG/DL (ref 5–25)
CALCIUM SERPL-MCNC: 9.4 MG/DL (ref 8.3–10.1)
CHLORIDE SERPL-SCNC: 109 MMOL/L (ref 100–108)
CHOLEST SERPL-MCNC: 271 MG/DL (ref 50–200)
CO2 SERPL-SCNC: 28 MMOL/L (ref 21–32)
CREAT SERPL-MCNC: 1.1 MG/DL (ref 0.6–1.3)
ERYTHROCYTE [DISTWIDTH] IN BLOOD BY AUTOMATED COUNT: 12.9 % (ref 11.6–15.1)
EST. AVERAGE GLUCOSE BLD GHB EST-MCNC: 117 MG/DL
GFR SERPL CREATININE-BSD FRML MDRD: 60 ML/MIN/1.73SQ M
GLUCOSE P FAST SERPL-MCNC: 94 MG/DL (ref 65–99)
HBA1C MFR BLD: 5.7 %
HCT VFR BLD AUTO: 41.5 % (ref 34.8–46.1)
HCV AB SER QL: NORMAL
HDLC SERPL-MCNC: 74 MG/DL
HGB BLD-MCNC: 13.5 G/DL (ref 11.5–15.4)
LDLC SERPL CALC-MCNC: 185 MG/DL (ref 0–100)
MCH RBC QN AUTO: 30.6 PG (ref 26.8–34.3)
MCHC RBC AUTO-ENTMCNC: 32.5 G/DL (ref 31.4–37.4)
MCV RBC AUTO: 94 FL (ref 82–98)
NT-PROBNP SERPL-MCNC: 23 PG/ML
PLATELET # BLD AUTO: 372 THOUSANDS/UL (ref 149–390)
PMV BLD AUTO: 9.8 FL (ref 8.9–12.7)
POTASSIUM SERPL-SCNC: 4.3 MMOL/L (ref 3.5–5.3)
PROT SERPL-MCNC: 7.6 G/DL (ref 6.4–8.2)
RBC # BLD AUTO: 4.41 MILLION/UL (ref 3.81–5.12)
SODIUM SERPL-SCNC: 142 MMOL/L (ref 136–145)
TRIGL SERPL-MCNC: 60 MG/DL
TSH SERPL DL<=0.05 MIU/L-ACNC: 1.2 UIU/ML (ref 0.36–3.74)
WBC # BLD AUTO: 5.21 THOUSAND/UL (ref 4.31–10.16)

## 2021-03-29 PROCEDURE — 83880 ASSAY OF NATRIURETIC PEPTIDE: CPT

## 2021-03-29 PROCEDURE — 84443 ASSAY THYROID STIM HORMONE: CPT

## 2021-03-29 PROCEDURE — 36415 COLL VENOUS BLD VENIPUNCTURE: CPT

## 2021-03-29 PROCEDURE — 85027 COMPLETE CBC AUTOMATED: CPT

## 2021-03-29 PROCEDURE — 86803 HEPATITIS C AB TEST: CPT

## 2021-03-29 PROCEDURE — 80053 COMPREHEN METABOLIC PANEL: CPT

## 2021-03-29 PROCEDURE — 80061 LIPID PANEL: CPT

## 2021-03-29 PROCEDURE — 83036 HEMOGLOBIN GLYCOSYLATED A1C: CPT

## 2021-03-29 RX ORDER — ATORVASTATIN CALCIUM 20 MG/1
20 TABLET, FILM COATED ORAL
Qty: 90 TABLET | Refills: 1 | Status: SHIPPED | OUTPATIENT
Start: 2021-03-29 | End: 2021-09-20

## 2021-03-29 NOTE — TELEPHONE ENCOUNTER
Spoke to the patient and notified about the message,The patient says that she would like to speak directly with the doctor before starting to take any type of medicine, she will be waiting for the call  AH

## 2021-03-29 NOTE — TELEPHONE ENCOUNTER
----- Message from Sofya Eli DO sent at 3/29/2021  6:31 PM EDT -----  Labs do not show any evidence of fluid overload/heart failure  Thyroid function is normal  Kidney function and electrolytes look good  Her cholesterol is pretty high and I would recommend atorvastatin 20mg

## 2021-04-07 ENCOUNTER — TELEPHONE (OUTPATIENT)
Dept: INTERNAL MEDICINE CLINIC | Facility: CLINIC | Age: 66
End: 2021-04-07

## 2021-04-07 NOTE — TELEPHONE ENCOUNTER
Call back # [phone]    Patient is requesting a call back in regards to her last visit--did not give any other info

## 2021-04-08 NOTE — TELEPHONE ENCOUNTER
She got pneumonia shot on 3/26 and is scheduled for covid vaccine on 4/13  I told her it was ok to get covid vaccine

## 2021-04-13 ENCOUNTER — IMMUNIZATIONS (OUTPATIENT)
Dept: FAMILY MEDICINE CLINIC | Facility: HOSPITAL | Age: 66
End: 2021-04-13
Attending: INTERNAL MEDICINE

## 2021-04-13 DIAGNOSIS — Z23 ENCOUNTER FOR IMMUNIZATION: Primary | ICD-10-CM

## 2021-04-13 PROCEDURE — 91300 SARS-COV-2 / COVID-19 MRNA VACCINE (PFIZER-BIONTECH) 30 MCG: CPT

## 2021-04-13 PROCEDURE — 0001A SARS-COV-2 / COVID-19 MRNA VACCINE (PFIZER-BIONTECH) 30 MCG: CPT

## 2021-04-19 ENCOUNTER — ANNUAL EXAM (OUTPATIENT)
Dept: OBGYN CLINIC | Facility: CLINIC | Age: 66
End: 2021-04-19
Payer: MEDICARE

## 2021-04-19 VITALS
SYSTOLIC BLOOD PRESSURE: 118 MMHG | WEIGHT: 160.4 LBS | HEIGHT: 60 IN | DIASTOLIC BLOOD PRESSURE: 82 MMHG | BODY MASS INDEX: 31.49 KG/M2

## 2021-04-19 DIAGNOSIS — R87.810 CERVICAL HIGH RISK HPV (HUMAN PAPILLOMAVIRUS) TEST POSITIVE: ICD-10-CM

## 2021-04-19 DIAGNOSIS — Z01.419 ENCOUNTER FOR GYNECOLOGICAL EXAMINATION WITHOUT ABNORMAL FINDING: Primary | ICD-10-CM

## 2021-04-19 PROCEDURE — G0145 SCR C/V CYTO,THINLAYER,RESCR: HCPCS | Performed by: NURSE PRACTITIONER

## 2021-04-19 PROCEDURE — G0101 CA SCREEN;PELVIC/BREAST EXAM: HCPCS | Performed by: NURSE PRACTITIONER

## 2021-04-19 NOTE — PATIENT INSTRUCTIONS
HPV (Human Papillomavirus)   WHAT YOU NEED TO KNOW:   What is human papillomavirus (HPV)? HPV is the most common infection spread by sexual contact  It can also be spread from a mother to her baby during delivery  HPV may cause oral and genital warts or tumors in your nose, mouth, throat, and lungs  HPV may also cause vaginal, penile, and anal cancers  You may not show symptoms of any of these conditions for several years after being exposed to HPV  What are the symptoms of HPV? · Painless warts    · Genital or anal discharge, bleeding, itching, or pain    · Pain when you urinate    How is HPV diagnosed? Your healthcare provider may use a vinegar liquid to help diagnose HPV genital warts  Women 27to 72years old can be checked for HPV during regular cervical cancer screenings  An HPV test checks for certain types of HPV that can cause changes in cervical cells  Without treatment, the changed cells can become cancer  An HPV test can be done every 5 years if the results show no infection  The test can be done with or without a Pap smear  A Pap smear checks for cancer or for abnormal cells that can become cancer  You may be tested for HPV if you are diagnosed with a mouth or throat cancer  How is HPV treated? HPV cannot be cured  Conditions that are caused by HPV can be treated  You will need to be monitored closely for these conditions  Ask your healthcare provider for more information about monitoring, conditions caused by HPV, and available treatments  How can HPV infection be prevented? · Ask about the HPV vaccine  The vaccine can help protect against HPV infection  Females and males can receive the vaccine  It is most effective if given before sexual activity begins  This allows the body to build almost complete protection against HPV before contact with the virus  The vaccine is usually given at 6or 15years of age but may be given as early as 5 years   The vaccine can be given through age 45     · Always use a condom during intercourse  A condom will not completely protect you from HPV infection, but it will help lower your risk  Use a new condom or latex barrier each time you have sex  This includes oral, vaginal, and anal sex  Make sure the condom fits and is put on correctly  Rubber latex sheets or dental dams can be used for oral sex  If you are allergic to latex, use a nonlatex product such as polyurethane  When should I call my doctor? · You have warts in your genital or anal area  · You have genital or anal discharge, bleeding, itching, or pain  · You have pain when you urinate  · You have questions or concerns about your condition or care  CARE AGREEMENT:   You have the right to help plan your care  Learn about your health condition and how it may be treated  Discuss treatment options with your healthcare providers to decide what care you want to receive  You always have the right to refuse treatment  The above information is an  only  It is not intended as medical advice for individual conditions or treatments  Talk to your doctor, nurse or pharmacist before following any medical regimen to see if it is safe and effective for you  © Copyright [address] Information is for End User's use only and may not be sold, redistributed or otherwise used for commercial purposes   All illustrations and images included in CareNotes® are the copyrighted property of [employer]  or [address] [place] [address] [employer]

## 2021-04-19 NOTE — PROGRESS NOTES
Diagnoses and all orders for this visit:    Encounter for gynecological examination without abnormal finding  -     Liquid-based pap, screening    Cervical high risk HPV (human papillomavirus) test positive        Call as needed, encouraged calcium/vit D in her diet, call with any PMB, all questions answered        Pleasant 77 y o  postmenopausal female here for annual exam  She denies postmenopausal bleeding  +history of HPV+ 2018, 2019 and 2020, last colpo 2018 nml, pap/hpv today  Agrees to a LEEP if still positive  Denies vaginal issues  Denies pelvic pain  Denies postmenopausal issues  NOT Sexually active  Colonoscopy due 2024  DXA scheduled  Past Medical History:   Diagnosis Date    Ovarian cyst     Postmenopausal atrophic vaginitis     Uterine prolapse      Past Surgical History:   Procedure Laterality Date    BREAST BIOPSY Left     benign- does not recall when    COLONOSCOPY  2014    HYSTERECTOMY  1986    PARTIAL HYSTERECTOMY      supracervical    DE COLONOSCOPY FLX DX W/COLLJ SPEC WHEN PFRMD N/A 2/20/2019    Procedure: COLONOSCOPY;  Surgeon: Jay Jay Gallego MD;  Location: [facility];   Service: Gastroenterology     Family History   Problem Relation Age of Onset    No Known Problems Mother     No Known Problems Father     Breast cancer Sister 48    Diabetes Family     Hypertension Family     Diabetes Other     Hypertension Other     Colon cancer Neg Hx     Ovarian cancer Neg Hx     Uterine cancer Neg Hx     Cervical cancer Neg Hx      Social History     Tobacco Use    Smoking status: Never Smoker    Smokeless tobacco: Never Used   Substance Use Topics    Alcohol use: No     Frequency: Never    Drug use: No       Current Outpatient Medications:     atorvastatin (LIPITOR) 20 mg tablet, Take 1 tablet (20 mg total) by mouth daily with dinner, Disp: 90 tablet, Rfl: 1    cholecalciferol (VITAMIN D3) 1,000 units tablet, Take 1,000 Units by mouth daily, Disp: , Rfl:     [employer] OMEGA-3 KRILL  MG CAPS, Take by mouth daily , Disp: , Rfl:     Multiple Vitamin (MULTIVITAMIN) capsule, Take 1 capsule by mouth daily, Disp: , Rfl:   Patient Active Problem List    Diagnosis Date Noted    Visual floaters, left 2020    Abnormal bruising 2020    Encounter for gynecological examination without abnormal finding 2019    Encounter for screening mammogram for malignant neoplasm of breast 2019    Overweight (BMI 25 0-29 9) 02/15/2018    Hypercholesterolemia 02/15/2018    Prediabetes 02/15/2018    Cervical high risk HPV (human papillomavirus) test positive 08/10/2017       No Known Allergies    OB History    Para Term  AB Living   1 1 1     1   SAB TAB Ectopic Multiple Live Births           1      # Outcome Date GA Lbr Piero/2nd Weight Sex Delivery Anes PTL Lv   1 Term              Son has 4 grandchildren, oldest is 29 and youngest is 13  Resigned from [address] at [employer] but works at [employer]:    21 0937   BP: 118/82   BP Location: Left arm   Patient Position: Sitting   Cuff Size: Standard   Weight: 72 8 kg (160 lb 6 4 oz)   Height: 5' (1 524 m)     Body mass index is 31 33 kg/m²  Review of Systems   Constitutional: Negative for chills, fatigue, fever and unexpected weight change  Respiratory: Negative for shortness of breath  Gastrointestinal: Negative for anal bleeding, blood in stool, constipation and diarrhea  Genitourinary: Negative for difficulty urinating, dysuria and hematuria  Physical Exam   Constitutional: She appears well-developed and well-nourished  No distress  HENT:   Head: Normocephalic  Neck: Normal range of motion  Neck supple  Pulmonary: Effort normal   Breasts: bilateral without masses, skin changes or nipple discharge  Bilaterally soft and warm to touch  No areas of erythema or pain  Abdominal: Soft  Pelvic exam was performed with patient supine  No labial fusion   There is no rash, tenderness, lesion or injury on the right labia  There is no rash, tenderness, lesion or injury on the left labia  Uterus is surgically absent  Cervix exhibits no motion tenderness, no discharge and no friability  Right adnexum displays no mass, no tenderness and no fullness  Left adnexum displays no mass, no tenderness and no fullness  No erythema or tenderness in the vagina  No foreign body in the vagina  No signs of injury around the vagina  No vaginal discharge found  Lymphadenopathy:        Right: No inguinal adenopathy present  Left: No inguinal adenopathy present

## 2021-04-22 LAB
LAB AP GYN PRIMARY INTERPRETATION: NORMAL
Lab: NORMAL

## 2021-04-26 ENCOUNTER — HOSPITAL ENCOUNTER (OUTPATIENT)
Dept: MAMMOGRAPHY | Facility: CLINIC | Age: 66
Discharge: HOME | End: 2021-04-26

## 2021-04-26 DIAGNOSIS — Z78.0 ASYMPTOMATIC POSTMENOPAUSAL STATE: ICD-10-CM

## 2021-05-05 ENCOUNTER — IMMUNIZATIONS (OUTPATIENT)
Dept: FAMILY MEDICINE CLINIC | Facility: HOSPITAL | Age: 66
End: 2021-05-05

## 2021-05-05 DIAGNOSIS — Z23 ENCOUNTER FOR IMMUNIZATION: Primary | ICD-10-CM

## 2021-05-05 PROCEDURE — 0002A SARS-COV-2 / COVID-19 MRNA VACCINE (PFIZER-BIONTECH) 30 MCG: CPT

## 2021-05-05 PROCEDURE — 91300 SARS-COV-2 / COVID-19 MRNA VACCINE (PFIZER-BIONTECH) 30 MCG: CPT

## 2021-05-20 ENCOUNTER — HOSPITAL ENCOUNTER (OUTPATIENT)
Dept: MAMMOGRAPHY | Facility: CLINIC | Age: 66
Discharge: HOME | End: 2021-05-20
Payer: MEDICARE

## 2021-05-20 VITALS — BODY MASS INDEX: 31.41 KG/M2 | WEIGHT: 160 LBS | HEIGHT: 60 IN

## 2021-05-20 DIAGNOSIS — Z12.31 ENCOUNTER FOR SCREENING MAMMOGRAM FOR MALIGNANT NEOPLASM OF BREAST: ICD-10-CM

## 2021-05-20 PROCEDURE — 77080 DXA BONE DENSITY AXIAL: CPT

## 2021-05-20 PROCEDURE — 77067 SCR MAMMO BI INCL CAD: CPT

## 2021-05-20 PROCEDURE — 77063 BREAST TOMOSYNTHESIS BI: CPT

## 2021-05-21 ENCOUNTER — TELEPHONE (OUTPATIENT)
Dept: INTERNAL MEDICINE CLINIC | Facility: CLINIC | Age: 66
End: 2021-05-21

## 2021-06-08 ENCOUNTER — TELEPHONE (OUTPATIENT)
Dept: INTERNAL MEDICINE CLINIC | Facility: CLINIC | Age: 66
End: 2021-06-08

## 2021-06-08 ENCOUNTER — HOSPITAL ENCOUNTER (OUTPATIENT)
Dept: NON INVASIVE DIAGNOSTICS | Facility: HOSPITAL | Age: 66
Discharge: HOME | End: 2021-06-08
Attending: INTERNAL MEDICINE
Payer: MEDICARE

## 2021-06-08 DIAGNOSIS — R06.00 DYSPNEA ON EXERTION: ICD-10-CM

## 2021-06-08 DIAGNOSIS — R60.0 LOWER EXTREMITY EDEMA: ICD-10-CM

## 2021-06-08 LAB
ARRHY DURING EX: NORMAL
CHEST PAIN STATEMENT: NORMAL
MAX DIASTOLIC BP: 64 MMHG
MAX HEART RATE: 150 BPM
MAX PREDICTED HEART RATE: 154 BPM
MAX. SYSTOLIC BP: 154 MMHG
PROTOCOL NAME: NORMAL
REASON FOR TERMINATION: NORMAL
TARGET HR FORMULA: NORMAL
TEST INDICATION: NORMAL
TIME IN EXERCISE PHASE: NORMAL

## 2021-06-08 PROCEDURE — 93350 STRESS TTE ONLY: CPT

## 2021-06-08 PROCEDURE — 93351 STRESS TTE COMPLETE: CPT | Performed by: INTERNAL MEDICINE

## 2021-06-08 NOTE — TELEPHONE ENCOUNTER
----- Message from Fay Scheuermann, DO sent at 6/8/2021 12:48 PM EDT -----  Call patient and let her know that her echo stress test was normal

## 2021-06-18 ENCOUNTER — HOSPITAL ENCOUNTER (OUTPATIENT)
Dept: ULTRASOUND IMAGING | Facility: CLINIC | Age: 66
Discharge: HOME | End: 2021-06-18
Payer: MEDICARE

## 2021-06-18 ENCOUNTER — HOSPITAL ENCOUNTER (OUTPATIENT)
Dept: MAMMOGRAPHY | Facility: CLINIC | Age: 66
Discharge: HOME | End: 2021-06-18
Payer: MEDICARE

## 2021-06-18 DIAGNOSIS — R92.8 ABNORMAL MAMMOGRAM: ICD-10-CM

## 2021-06-18 PROCEDURE — G0279 TOMOSYNTHESIS, MAMMO: HCPCS

## 2021-06-18 PROCEDURE — 77065 DX MAMMO INCL CAD UNI: CPT

## 2021-06-18 PROCEDURE — 76642 ULTRASOUND BREAST LIMITED: CPT

## 2021-09-20 DIAGNOSIS — E78.00 HYPERCHOLESTEROLEMIA: Chronic | ICD-10-CM

## 2021-09-20 RX ORDER — ATORVASTATIN CALCIUM 20 MG/1
TABLET, FILM COATED ORAL
Qty: 90 TABLET | Refills: 1 | Status: SHIPPED | OUTPATIENT
Start: 2021-09-20 | End: 2022-03-28

## 2021-09-28 ENCOUNTER — OFFICE VISIT (OUTPATIENT)
Dept: INTERNAL MEDICINE CLINIC | Facility: CLINIC | Age: 66
End: 2021-09-28
Payer: MEDICARE

## 2021-09-28 VITALS
TEMPERATURE: 98 F | RESPIRATION RATE: 12 BRPM | HEART RATE: 84 BPM | BODY MASS INDEX: 31.25 KG/M2 | DIASTOLIC BLOOD PRESSURE: 76 MMHG | HEIGHT: 60 IN | WEIGHT: 159.2 LBS | OXYGEN SATURATION: 98 % | SYSTOLIC BLOOD PRESSURE: 114 MMHG

## 2021-09-28 DIAGNOSIS — E78.00 HYPERCHOLESTEROLEMIA: Primary | Chronic | ICD-10-CM

## 2021-09-28 PROCEDURE — 99213 OFFICE O/P EST LOW 20 MIN: CPT | Performed by: INTERNAL MEDICINE

## 2021-09-28 NOTE — PATIENT INSTRUCTIONS
Hyperlipidemia   AMBULATORY CARE:   Hyperlipidemia  is a high level of lipids (fats) in your blood  These lipids include cholesterol or triglycerides  Lipids are made by your body  They also come from the foods you eat  Your body needs lipids to work properly, but high levels increase your risk for heart disease, heart attack, and stroke  Call your local emergency number [phone] in the [address] [facility]) or have someone call if:   · You have any of the following signs of a heart attack:      ? Squeezing, pressure, or pain in your chest    ? You may  also have any of the following:     § Discomfort or pain in your back, neck, jaw, stomach, or arm    § Shortness of breath    § Nausea or vomiting    § Lightheadedness or a sudden cold sweat    · You have any of the following signs of a stroke:      ? Numbness or drooping on one side of your face     ? Weakness in an arm or leg    ? Confusion or difficulty speaking    ? Dizziness, a severe headache, or vision loss    Call your doctor if:   · You have questions or concerns about your condition or care  Treatment  may first include lifestyle changes to help decrease your lipid levels  Your provider may recommend you work with a team to manage hyperlipidemia  The team may include medical experts such as a dietitian, an exercise or physical therapist, and a behavior therapist  Your family members may be included in helping you create lifestyle changes  You may also need to take medicine to lower your lipid levels  Some of the lifestyle changes you may need to make include the following:  · Maintain a healthy weight  Ask your healthcare provider what a healthy weight is for you  Ask him or her to help you create a weight loss plan if you are overweight  Weight loss can decrease your cholesterol and triglyceride levels  · Be physically active throughout the day  Physical activity, such as exercise, lowers your cholesterol levels and helps you maintain a healthy weight   Get 30 minutes or more of aerobic exercise 4 to 6 days each week  You can split your exercise into four 10-minute workouts instead of 30 minutes at one time  Examples of aerobic exercises include walking briskly, swimming, or riding a bike  Work with your healthcare provider to plan the best exercise program for you  Also include strength training at least 2 times each week  Your healthcare providers can help you create a physical activity plan  · Do not smoke  Nicotine and other chemicals in cigarettes and cigars can increase your risk for a heart attack and stroke  Ask your healthcare provider for information if you currently smoke and need help to quit  E-cigarettes or smokeless tobacco still contain nicotine  Talk to your healthcare provider before you use these products  · Eat heart-healthy foods  A dietitian or your provider can give you more information on low-sodium plans or the DASH (Dietary Approaches to Stop Hypertension) eating plan  The DASH plan is low in sodium, processed sugar, unhealthy fats, and total fat  It is high in potassium, calcium, and fiber  It is high in potassium, calcium, and fiber  These can be found in vegetables, fruit, and whole-grain foods  The following are ways to get more heart-healthy foods:         ? Decrease the total amount of fat you eat  Choose lean meats, fat-free or 1% fat milk, and low-fat dairy products, such as yogurt and cheese  Limit or do not eat red meat  Red meats are high in fat and cholesterol  ? Replace unhealthy fats with healthy fats  Unhealthy fats include saturated fat, trans fat, and cholesterol  Choose soft margarines that are low in saturated fat and have little or no trans fat  Monounsaturated fats are healthy fats  These are found in olive oil, canola oil, avocado, and nuts  Polyunsaturated fats are also healthy  These are found in fish, flaxseed, walnuts, and soybeans  ? Eat 5 or more servings of fruits and vegetables every day    They are low in calories and fat and a good source of essential vitamins  Include dark green, red, and orange vegetables  Examples include spinach, kale, broccoli, and carrots  ? Eat foods high in fiber  Fiber can help lower your cholesterol levels  Choose whole grain, high-fiber foods  Good choices include whole-wheat breads or cereals, beans, peas, fruits, and vegetables  ? Limit sodium (salt) as directed  Too much sodium can affect your fluid balance and blood pressure  Your healthcare provider will tell you how much sodium and potassium are safe for you to have in a day  He or she may recommend that you limit sodium to 2,300 mg a day  Your provider or a dietitian can help you find ways to limit sodium  For example, if you add salt while you cook, do not add more salt at the table  Check labels to find low-sodium or no-salt-added foods  Some low-sodium foods use potassium salts for flavor  Too much potassium can also cause health problems  · Ask your healthcare provider if it is okay for you to drink alcohol  Alcohol can increase your cholesterol and triglyceride levels  Your provider can tell you how many drinks are okay to have within 24 hours and within 1 week  A drink of alcohol is 12 ounces of beer, 5 ounces of wine, or 1½ ounces of liquor  Follow up with your doctor as directed: You may need to return for more tests  Your healthcare provider may refer you to a dietitian  Write down your questions so you remember to ask them during your visits  © Copyright [employer] 2021 Information is for End User's use only and may not be sold, redistributed or otherwise used for commercial purposes  All illustrations and images included in CareNotes® are the copyrighted property of [employer]  or [facility]   The above information is an  only  It is not intended as medical advice for individual conditions or treatments   Talk to your doctor, nurse or pharmacist before following any medical regimen to see if it is safe and effective for you

## 2021-09-28 NOTE — PROGRESS NOTES
[facility] - [facility]    NAME: Ness Francis  AGE: 77 y o  SEX: female  : 1955     DATE: 2021     Assessment and Plan:     1  Hypercholesterolemia    Check lipids  Continue atorvastatin as prescribed  Heart healthy diet  - Lipid Panel with Direct LDL reflex; Future  - Comprehensive metabolic panel; Future        Return in about 6 months (around 3/28/2022) for Follow-up  Chief Complaint:     Chief Complaint   Patient presents with    Follow-up      History of Present Illness:     Herb Medina presents for follow-up  Her cholesterol was high at last visit and she was started on atorvastatin 20mg  She has been tolerating medication without side effects  Due for repeat labs  Review of Systems:     Review of Systems   Constitutional: Negative  Respiratory: Negative  Cardiovascular: Negative  Gastrointestinal: Negative  Objective:     /76 (BP Location: Left arm, Patient Position: Sitting, Cuff Size: Large)   Pulse 84   Temp 98 °F (36 7 °C) (Tympanic)   Resp 12   Ht 5' (1 524 m)   Wt 72 2 kg (159 lb 3 2 oz)   SpO2 98%   BMI 31 09 kg/m²     Physical Exam  Constitutional:       General: She is not in acute distress  Appearance: She is not ill-appearing  Cardiovascular:      Rate and Rhythm: Normal rate and regular rhythm  Heart sounds: No murmur heard  Pulmonary:      Effort: Pulmonary effort is normal  No respiratory distress  Breath sounds: No wheezing  Abdominal:      General: Bowel sounds are normal  There is no distension  Tenderness: There is no abdominal tenderness  Musculoskeletal:      Right lower leg: No edema  Left lower leg: No edema  Neurological:      Mental Status: She is alert         Mitch Arredondo DO  [facility]

## 2021-09-29 ENCOUNTER — APPOINTMENT (OUTPATIENT)
Dept: LAB | Facility: CLINIC | Age: 66
End: 2021-09-29
Payer: MEDICARE

## 2021-09-29 DIAGNOSIS — E78.00 HYPERCHOLESTEROLEMIA: Chronic | ICD-10-CM

## 2021-09-29 LAB
ALBUMIN SERPL BCP-MCNC: 3.6 G/DL (ref 3.5–5)
ALP SERPL-CCNC: 81 U/L (ref 46–116)
ALT SERPL W P-5'-P-CCNC: 21 U/L (ref 12–78)
ANION GAP SERPL CALCULATED.3IONS-SCNC: 4 MMOL/L (ref 4–13)
AST SERPL W P-5'-P-CCNC: 16 U/L (ref 5–45)
BILIRUB SERPL-MCNC: 0.36 MG/DL (ref 0.2–1)
BUN SERPL-MCNC: 18 MG/DL (ref 5–25)
CALCIUM SERPL-MCNC: 9.1 MG/DL (ref 8.3–10.1)
CHLORIDE SERPL-SCNC: 112 MMOL/L (ref 100–108)
CHOLEST SERPL-MCNC: 164 MG/DL (ref 50–200)
CO2 SERPL-SCNC: 26 MMOL/L (ref 21–32)
CREAT SERPL-MCNC: 0.91 MG/DL (ref 0.6–1.3)
GFR SERPL CREATININE-BSD FRML MDRD: 76 ML/MIN/1.73SQ M
GLUCOSE P FAST SERPL-MCNC: 96 MG/DL (ref 65–99)
HDLC SERPL-MCNC: 65 MG/DL
LDLC SERPL CALC-MCNC: 86 MG/DL (ref 0–100)
POTASSIUM SERPL-SCNC: 4.3 MMOL/L (ref 3.5–5.3)
PROT SERPL-MCNC: 7.1 G/DL (ref 6.4–8.2)
SODIUM SERPL-SCNC: 142 MMOL/L (ref 136–145)
TRIGL SERPL-MCNC: 67 MG/DL

## 2021-09-29 PROCEDURE — 36415 COLL VENOUS BLD VENIPUNCTURE: CPT

## 2021-09-29 PROCEDURE — 80061 LIPID PANEL: CPT

## 2021-09-29 PROCEDURE — 80053 COMPREHEN METABOLIC PANEL: CPT

## 2021-09-30 ENCOUNTER — TELEPHONE (OUTPATIENT)
Dept: INTERNAL MEDICINE CLINIC | Facility: CLINIC | Age: 66
End: 2021-09-30

## 2021-09-30 NOTE — TELEPHONE ENCOUNTER
----- Message from Carine Esparza DO sent at 9/29/2021  6:34 PM EDT -----  Cholesterol has improved from 271 to 164 which is excellent   Glucose levels and electrolytes normal  Liver function normal

## 2021-11-19 ENCOUNTER — IMMUNIZATIONS (OUTPATIENT)
Dept: FAMILY MEDICINE CLINIC | Facility: HOSPITAL | Age: 66
End: 2021-11-19

## 2021-11-19 DIAGNOSIS — Z23 ENCOUNTER FOR IMMUNIZATION: Primary | ICD-10-CM

## 2021-11-19 PROCEDURE — 0001A COVID-19 PFIZER VACC 0.3 ML: CPT

## 2021-11-19 PROCEDURE — 91300 COVID-19 PFIZER VACC 0.3 ML: CPT

## 2022-03-28 DIAGNOSIS — E78.00 HYPERCHOLESTEROLEMIA: Chronic | ICD-10-CM

## 2022-03-28 RX ORDER — ATORVASTATIN CALCIUM 20 MG/1
TABLET, FILM COATED ORAL
Qty: 90 TABLET | Refills: 1 | Status: SHIPPED | OUTPATIENT
Start: 2022-03-28

## 2022-03-29 ENCOUNTER — OFFICE VISIT (OUTPATIENT)
Dept: INTERNAL MEDICINE CLINIC | Facility: CLINIC | Age: 67
End: 2022-03-29
Payer: COMMERCIAL

## 2022-03-29 VITALS
RESPIRATION RATE: 20 BRPM | DIASTOLIC BLOOD PRESSURE: 70 MMHG | WEIGHT: 156 LBS | SYSTOLIC BLOOD PRESSURE: 120 MMHG | BODY MASS INDEX: 30.63 KG/M2 | OXYGEN SATURATION: 97 % | HEART RATE: 82 BPM | HEIGHT: 60 IN | TEMPERATURE: 98.3 F

## 2022-03-29 DIAGNOSIS — R53.1 PARESTHESIAS WITH SUBJECTIVE WEAKNESS: ICD-10-CM

## 2022-03-29 DIAGNOSIS — E78.00 HYPERCHOLESTEROLEMIA: Primary | Chronic | ICD-10-CM

## 2022-03-29 DIAGNOSIS — R73.03 PREDIABETES: Chronic | ICD-10-CM

## 2022-03-29 DIAGNOSIS — R20.2 PARESTHESIAS WITH SUBJECTIVE WEAKNESS: ICD-10-CM

## 2022-03-29 DIAGNOSIS — Z00.00 MEDICARE ANNUAL WELLNESS VISIT, SUBSEQUENT: ICD-10-CM

## 2022-03-29 PROCEDURE — 1170F FXNL STATUS ASSESSED: CPT | Performed by: INTERNAL MEDICINE

## 2022-03-29 PROCEDURE — 3288F FALL RISK ASSESSMENT DOCD: CPT | Performed by: INTERNAL MEDICINE

## 2022-03-29 PROCEDURE — 1125F AMNT PAIN NOTED PAIN PRSNT: CPT | Performed by: INTERNAL MEDICINE

## 2022-03-29 PROCEDURE — 1160F RVW MEDS BY RX/DR IN RCRD: CPT | Performed by: INTERNAL MEDICINE

## 2022-03-29 PROCEDURE — 3008F BODY MASS INDEX DOCD: CPT | Performed by: INTERNAL MEDICINE

## 2022-03-29 PROCEDURE — 99214 OFFICE O/P EST MOD 30 MIN: CPT | Performed by: INTERNAL MEDICINE

## 2022-03-29 PROCEDURE — G0444 DEPRESSION SCREEN ANNUAL: HCPCS | Performed by: INTERNAL MEDICINE

## 2022-03-29 PROCEDURE — 3725F SCREEN DEPRESSION PERFORMED: CPT | Performed by: INTERNAL MEDICINE

## 2022-03-29 PROCEDURE — G0439 PPPS, SUBSEQ VISIT: HCPCS | Performed by: INTERNAL MEDICINE

## 2022-03-29 PROCEDURE — 1101F PT FALLS ASSESS-DOCD LE1/YR: CPT | Performed by: INTERNAL MEDICINE

## 2022-03-29 PROCEDURE — 1003F LEVEL OF ACTIVITY ASSESS: CPT | Performed by: INTERNAL MEDICINE

## 2022-03-29 PROCEDURE — 1090F PRES/ABSN URINE INCON ASSESS: CPT | Performed by: INTERNAL MEDICINE

## 2022-03-29 NOTE — PATIENT INSTRUCTIONS
Medicare Preventive Visit Patient Instructions  Thank you for completing your Welcome to Medicare Visit or Medicare Annual Wellness Visit today  Your next wellness visit will be due in one year (3/30/2023)  The screening/preventive services that you may require over the next 5-10 years are detailed below  Some tests may not apply to you based off risk factors and/or age  Screening tests ordered at today's visit but not completed yet may show as past due  Also, please note that scanned in results may not display below  Preventive Screenings:  Service Recommendations Previous Testing/Comments   Colorectal Cancer Screening  * Colonoscopy    * Fecal Occult Blood Test (FOBT)/Fecal Immunochemical Test (FIT)  * Fecal DNA/Cologuard Test  * Flexible Sigmoidoscopy Age: 54-65 years old   Colonoscopy: every 10 years (may be performed more frequently if at higher risk)  OR  FOBT/FIT: every 1 year  OR  Cologuard: every 3 years  OR  Sigmoidoscopy: every 5 years  Screening may be recommended earlier than age 48 if at higher risk for colorectal cancer  Also, an individualized decision between you and your healthcare provider will decide whether screening between the ages of 74-80 would be appropriate  Colonoscopy: 02/20/2019  FOBT/FIT: Not on file  Cologuard: Not on file  Sigmoidoscopy: Not on file    Screening Current     Breast Cancer Screening Age: 36 years old  Frequency: every 1-2 years  Not required if history of left and right mastectomy Mammogram: 06/18/2021    Screening Current   Cervical Cancer Screening Between the ages of 21-29, pap smear recommended once every 3 years  Between the ages of 33-67, can perform pap smear with HPV co-testing every 5 years     Recommendations may differ for women with a history of total hysterectomy, cervical cancer, or abnormal pap smears in past  Pap Smear: 04/19/2021    Screening Not Indicated   Hepatitis C Screening Once for adults born between 1945 and 1965  More frequently in patients at high risk for Hepatitis C Hep C Antibody: 03/29/2021    Risks and Benefits Discussed  Due for Hepatitis C screening   Diabetes Screening 1-2 times per year if you're at risk for diabetes or have pre-diabetes Fasting glucose: 96 mg/dL   A1C: 5 7 %    Screening Current   Cholesterol Screening Once every 5 years if you don't have a lipid disorder  May order more often based on risk factors  Lipid panel: 09/29/2021    Screening Not Indicated  History Lipid Disorder     Other Preventive Screenings Covered by Medicare:  1  Abdominal Aortic Aneurysm (AAA) Screening: covered once if your at risk  You're considered to be at risk if you have a family history of AAA  2  Lung Cancer Screening: covers low dose CT scan once per year if you meet all of the following conditions: (1) Age 50-69; (2) No signs or symptoms of lung cancer; (3) Current smoker or have quit smoking within the last 15 years; (4) You have a tobacco smoking history of at least 30 pack years (packs per day multiplied by number of years you smoked); (5) You get a written order from a healthcare provider  3  Glaucoma Screening: covered annually if you're considered high risk: (1) You have diabetes OR (2) Family history of glaucoma OR (3)  aged 48 and older OR (3)  American aged 72 and older  3  Osteoporosis Screening: covered every 2 years if you meet one of the following conditions: (1) You're estrogen deficient and at risk for osteoporosis based off medical history and other findings; (2) Have a vertebral abnormality; (3) On glucocorticoid therapy for more than 3 months; (4) Have primary hyperparathyroidism; (5) On osteoporosis medications and need to assess response to drug therapy  · Last bone density test (DXA Scan): 05/20/2021   5  HIV Screening: covered annually if you're between the age of 15-65  Also covered annually if you are younger than 13 and older than 72 with risk factors for HIV infection   For pregnant patients, it is covered up to 3 times per pregnancy  Immunizations:  Immunization Recommendations   Influenza Vaccine Annual influenza vaccination during flu season is recommended for all persons aged >= 6 months who do not have contraindications   Pneumococcal Vaccine (Prevnar and Pneumovax)  * Prevnar = PCV13  * Pneumovax = PPSV23   Adults 25-60 years old: 1-3 doses may be recommended based on certain risk factors  Adults 72 years old: Prevnar (PCV13) vaccine recommended followed by Pneumovax (PPSV23) vaccine  If already received PPSV23 since turning 65, then PCV13 recommended at least one year after PPSV23 dose  Hepatitis B Vaccine 3 dose series if at intermediate or high risk (ex: diabetes, end stage renal disease, liver disease)   Tetanus (Td) Vaccine - COST NOT COVERED BY MEDICARE PART B Following completion of primary series, a booster dose should be given every 10 years to maintain immunity against tetanus  Td may also be given as tetanus wound prophylaxis  Tdap Vaccine - COST NOT COVERED BY MEDICARE PART B Recommended at least once for all adults  For pregnant patients, recommended with each pregnancy  Shingles Vaccine (Shingrix) - COST NOT COVERED BY MEDICARE PART B  2 shot series recommended in those aged 48 and above     Health Maintenance Due:      Topic Date Due    Breast Cancer Screening: Mammogram  06/18/2022    Colorectal Cancer Screening  02/20/2024    DXA SCAN  05/20/2031    Hepatitis C Screening  Completed     Immunizations Due:      Topic Date Due    DTaP,Tdap,and Td Vaccines (1 - Tdap) Never done     Advance Directives   What are advance directives? Advance directives are legal documents that state your wishes and plans for medical care  These plans are made ahead of time in case you lose your ability to make decisions for yourself  Advance directives can apply to any medical decision, such as the treatments you want, and if you want to donate organs     What are the types of advance directives? There are many types of advance directives, and each state has rules about how to use them  You may choose a combination of any of the following:  · Living will: This is a written record of the treatment you want  You can also choose which treatments you do not want, which to limit, and which to stop at a certain time  This includes surgery, medicine, IV fluid, and tube feedings  · Durable power of  for healthcare [facility]): This is a written record that states who you want to make healthcare choices for you when you are unable to make them for yourself  This person, called a proxy, is usually a family member or a friend  You may choose more than 1 proxy  · Do not resuscitate (DNR) order:  A DNR order is used in case your heart stops beating or you stop breathing  It is a request not to have certain forms of treatment, such as CPR  A DNR order may be included in other types of advance directives  · Medical directive: This covers the care that you want if you are in a coma, near death, or unable to make decisions for yourself  You can list the treatments you want for each condition  Treatment may include pain medicine, surgery, blood transfusions, dialysis, IV or tube feedings, and a ventilator (breathing machine)  · Values history: This document has questions about your views, beliefs, and how you feel and think about life  This information can help others choose the care that you would choose  Why are advance directives important? An advance directive helps you control your care  Although spoken wishes may be used, it is better to have your wishes written down  Spoken wishes can be misunderstood, or not followed  Treatments may be given even if you do not want them  An advance directive may make it easier for your family to make difficult choices about your care  Urinary Incontinence   Urinary incontinence (UI)  is when you lose control of your bladder   UI develops because your bladder cannot store or empty urine properly  The 3 most common types of UI are stress incontinence, urge incontinence, or both  Medicines:   · May be given to help strengthen your bladder control  Report any side effects of medication to your healthcare provider  Do pelvic muscle exercises often:  Your pelvic muscles help you stop urinating  Squeeze these muscles tight for 5 seconds, then relax for 5 seconds  Gradually work up to squeezing for 10 seconds  Do 3 sets of 15 repetitions a day, or as directed  This will help strengthen your pelvic muscles and improve bladder control  Train your bladder:  Go to the bathroom at set times, such as every 2 hours, even if you do not feel the urge to go  You can also try to hold your urine when you feel the urge to go  For example, hold your urine for 5 minutes when you feel the urge to go  As that becomes easier, hold your urine for 10 minutes  Self-care:   · Keep a UI record  Write down how often you leak urine and how much you leak  Make a note of what you were doing when you leaked urine  · Drink liquids as directed  You may need to limit the amount of liquid you drink to help control your urine leakage  Do not drink any liquid right before you go to bed  Limit or do not have drinks that contain caffeine or alcohol  · Prevent constipation  Eat a variety of high-fiber foods  Good examples are high-fiber cereals, beans, vegetables, and whole-grain breads  Walking is the best way to trigger your intestines to have a bowel movement  · Exercise regularly and maintain a healthy weight  Weight loss and exercise will decrease pressure on your bladder and help you control your leakage  · Use a catheter as directed  to help empty your bladder  A catheter is a tiny, plastic tube that is put into your bladder to drain your urine  · Go to behavior therapy as directed  Behavior therapy may be used to help you learn to control your urge to urinate      Weight Management   Why it is important to manage your weight:  Being overweight increases your risk of health conditions such as heart disease, high blood pressure, type 2 diabetes, and certain types of cancer  It can also increase your risk for osteoarthritis, sleep apnea, and other respiratory problems  Aim for a slow, steady weight loss  Even a small amount of weight loss can lower your risk of health problems  How to lose weight safely:  A safe and healthy way to lose weight is to eat fewer calories and get regular exercise  You can lose up about 1 pound a week by decreasing the number of calories you eat by 500 calories each day  Healthy meal plan for weight management:  A healthy meal plan includes a variety of foods, contains fewer calories, and helps you stay healthy  A healthy meal plan includes the following:  · Eat whole-grain foods more often  A healthy meal plan should contain fiber  Fiber is the part of grains, fruits, and vegetables that is not broken down by your body  Whole-grain foods are healthy and provide extra fiber in your diet  Some examples of whole-grain foods are whole-wheat breads and pastas, oatmeal, brown rice, and bulgur  · Eat a variety of vegetables every day  Include dark, leafy greens such as spinach, kale, natalie greens, and mustard greens  Eat yellow and orange vegetables such as carrots, sweet potatoes, and winter squash  · Eat a variety of fruits every day  Choose fresh or canned fruit (canned in its own juice or light syrup) instead of juice  Fruit juice has very little or no fiber  · Eat low-fat dairy foods  Drink fat-free (skim) milk or 1% milk  Eat fat-free yogurt and low-fat cottage cheese  Try low-fat cheeses such as mozzarella and other reduced-fat cheeses  · Choose meat and other protein foods that are low in fat  Choose beans or other legumes such as split peas or lentils   Choose fish, skinless poultry (chicken or turkey), or lean cuts of red meat (beef or pork)  Before you cook meat or poultry, cut off any visible fat  · Use less fat and oil  Try baking foods instead of frying them  Add less fat, such as margarine, sour cream, regular salad dressing and mayonnaise to foods  Eat fewer high-fat foods  Some examples of high-fat foods include french fries, doughnuts, ice cream, and cakes  · Eat fewer sweets  Limit foods and drinks that are high in sugar  This includes candy, cookies, regular soda, and sweetened drinks  Exercise:  Exercise at least 30 minutes per day on most days of the week  Some examples of exercise include walking, biking, dancing, and swimming  You can also fit in more physical activity by taking the stairs instead of the elevator or parking farther away from stores  Ask your healthcare provider about the best exercise plan for you  © Copyright [employer] 2018 Information is for End User's use only and may not be sold, redistributed or otherwise used for commercial purposes   All illustrations and images included in CareNotes® are the copyrighted property of [employer] , Inc  or [address] [place] [address]

## 2022-03-29 NOTE — PROGRESS NOTES
Assessment and Plan:     1  Medicare annual wellness visit, subsequent    BMI Counseling: Body mass index is 30 47 kg/m²  The BMI is above normal  Nutrition recommendations include limiting drinks that contain sugar, moderation in carbohydrate intake and increasing intake of lean protein  Exercise recommendations include exercising 3-5 times per week  Rationale for BMI follow-up plan is due to patient being overweight or obese  Depression Screening and Follow-up Plan: Patient was screened for depression during today's encounter  They screened negative with a PHQ-2 score of 0  Preventive health issues were discussed with patient, and age appropriate screening tests were ordered as noted in patient's After Visit Summary  Personalized health advice and appropriate referrals for health education or preventive services given if needed, as noted in patient's After Visit Summary  History of Present Illness:     Patient presents for Medicare Annual Wellness visit    Patient Care Team:  Cassidy Perez DO as PCP - General (Internal Medicine)  Tanvir Piper MD as Endoscopist     Problem List:     Patient Active Problem List   Diagnosis    Cervical high risk HPV (human papillomavirus) test positive    Overweight (BMI 25 0-29  9)    Hypercholesterolemia    Prediabetes    Encounter for gynecological examination without abnormal finding    Encounter for screening mammogram for malignant neoplasm of breast    Visual floaters, left    Abnormal bruising      Past Medical and Surgical History:     Past Medical History:   Diagnosis Date    Ovarian cyst     Postmenopausal atrophic vaginitis     Uterine prolapse      Past Surgical History:   Procedure Laterality Date    BREAST BIOPSY Left     benign- does not recall when    COLONOSCOPY  2014    HYSTERECTOMY  1986    PARTIAL HYSTERECTOMY      supracervical    DC COLONOSCOPY FLX DX W/COLLJ SPEC WHEN PFRMD N/A 2/20/2019    Procedure: COLONOSCOPY; Surgeon: Zeinab Drew MD;  Location: [facility];   Service: Gastroenterology      Family History:     Family History   Problem Relation Age of Onset    No Known Problems Mother     No Known Problems Father     Breast cancer Sister 48    Diabetes Family     Hypertension Family     Diabetes Other     Hypertension Other     No Known Problems Maternal Grandmother     No Known Problems Paternal Grandmother     No Known Problems Sister     No Known Problems Maternal Aunt     No Known Problems Maternal Aunt     No Known Problems Maternal Aunt     No Known Problems Paternal Aunt     No Known Problems Paternal Aunt     Colon cancer Neg Hx     Ovarian cancer Neg Hx     Uterine cancer Neg Hx     Cervical cancer Neg Hx       Social History:     Social History     Socioeconomic History    Marital status: /Civil Union     Spouse name: None    Number of children: None    Years of education: None    Highest education level: None   Occupational History    None   Tobacco Use    Smoking status: Never Smoker    Smokeless tobacco: Never Used   Vaping Use    Vaping Use: Never used   Substance and Sexual Activity    Alcohol use: No    Drug use: No    Sexual activity: Yes     Partners: Male     Birth control/protection: Surgical   Other Topics Concern    None   Social History Narrative    None     Social Determinants of Health     Financial Resource Strain: Not on file   Food Insecurity: Not on file   Transportation Needs: Not on file   Physical Activity: Not on file   Stress: Not on file   Social Connections: Not on file   Intimate Partner Violence: Not on file   Housing Stability: Not on file      Medications and Allergies:     Current Outpatient Medications   Medication Sig Dispense Refill    atorvastatin (LIPITOR) 20 mg tablet TAKE 1 TABLET BY MOUTH EVERY DAY WITH DINNER 90 tablet 1    cholecalciferol (VITAMIN D3) 1,000 units tablet Take 1,000 Units by mouth daily      CVS OMEGA-3 KRILL OIL 300 MG CAPS Take by mouth daily       Multiple Vitamin (MULTIVITAMIN) capsule Take 1 capsule by mouth daily       No current facility-administered medications for this visit  No Known Allergies   Immunizations:     Immunization History   Administered Date(s) Administered    COVID-19 PFIZER VACCINE 0 3 ML IM 04/13/2021, 05/05/2021, 11/19/2021    Pneumococcal Polysaccharide PPV23 03/26/2021      Health Maintenance:         Topic Date Due    Breast Cancer Screening: Mammogram  06/18/2022    Colorectal Cancer Screening  02/20/2024    DXA SCAN  05/20/2031    Hepatitis C Screening  Completed         Topic Date Due    DTaP,Tdap,and Td Vaccines (1 - Tdap) Never done      Medicare Health Risk Assessment:     /70 (BP Location: Left arm, Patient Position: Sitting, Cuff Size: Standard)   Pulse 82   Temp 98 3 °F (36 8 °C) (Tympanic)   Resp 20   Ht 5' (1 524 m)   Wt 70 8 kg (156 lb)   SpO2 97%   BMI 30 47 kg/m²      Rowan Schirmer is here for her Subsequent Wellness visit  Last Medicare Wellness visit information reviewed, patient interviewed, no change since last AWV  Health Risk Assessment:   Patient rates overall health as good  Patient feels that their physical health rating is slightly worse  Patient is satisfied with their life  Eyesight was rated as slightly worse  Hearing was rated as slightly worse  Patient feels that their emotional and mental health rating is same  Patients states they are sometimes angry  Patient states they are sometimes unusually tired/fatigued  Pain experienced in the last 7 days has been none  Patient states that she has experienced weight loss or gain in last 6 months  Depression Screening:   PHQ-2 Score: 0      Fall Risk Screening: In the past year, patient has experienced: no history of falling in past year      Urinary Incontinence Screening:   Patient has leaked urine accidently in the last six months       Home Safety:  Patient does not have trouble with stairs inside or outside of their home  Patient has working smoke alarms and has working carbon monoxide detector  Home safety hazards include: none  Nutrition:   Current diet is Regular  Medications:   Patient is currently taking over-the-counter supplements  OTC medications include: see medication list  Patient is able to manage medications  Activities of Daily Living (ADLs)/Instrumental Activities of Daily Living (IADLs):   Walk and transfer into and out of bed and chair?: Yes  Dress and groom yourself?: Yes    Bathe or shower yourself?: Yes    Feed yourself?  Yes  Do your laundry/housekeeping?: Yes  Manage your money, pay your bills and track your expenses?: Yes  Make your own meals?: Yes    Do your own shopping?: Yes    Durable Medical Equipment Suppliers  none    Previous Hospitalizations:   Any hospitalizations or ED visits within the last 12 months?: No      Advance Care Planning:   Living will: No    Durable POA for healthcare: No    Advanced directive: No    Five wishes given: Yes      Cognitive Screening:   Provider or family/friend/caregiver concerned regarding cognition?: No    PREVENTIVE SCREENINGS      Cardiovascular Screening:    General: Screening Not Indicated and History Lipid Disorder      Diabetes Screening:     General: Screening Current      Colorectal Cancer Screening:     General: Screening Current      Breast Cancer Screening:     General: Screening Current      Cervical Cancer Screening:    General: Screening Not Indicated      Osteoporosis Screening:    General: Screening Current      Abdominal Aortic Aneurysm (AAA) Screening:        General: Screening Not Indicated      Lung Cancer Screening:     General: Screening Not Indicated      Hepatitis C Screening:    General: Screening Current    Screening, Brief Intervention, and Referral to Treatment (SBIRT)    Screening  Typical number of drinks in a day: 0  Typical number of drinks in a week: 0  Interpretation: Low risk drinking behavior  AUDIT-C Screenin) How often did you have a drink containing alcohol in the past year? never  2) How many drinks did you have on a typical day when you were drinking in the past year? never  3) How often did you have 6 or more drinks on one occasion in the past year? never    AUDIT-C Score: 0  Interpretation: Score 0-2 (female): Negative screen for alcohol misuse    Single Item Drug Screening:  How often have you used an illegal drug (including marijuana) or a prescription medication for non-medical reasons in the past year? never    Single Item Drug Screen Score: 0  Interpretation: Negative screen for possible drug use disorder    Brief Intervention  Alcohol & drug use screenings were reviewed  No concerns regarding substance use disorder identified  Other Counseling Topics:   Car/seat belt/driving safety, skin self-exam, sunscreen and regular weightbearing exercise         Suzanne Lips, DO

## 2022-03-29 NOTE — PROGRESS NOTES
[facility] - [facility]    NAME: Madie Tiwari  AGE: 79 y o  SEX: female  : 1955     DATE: 3/29/2022     Assessment and Plan:     1  Hypercholesterolemia    Component      Latest Ref Rng & Units 3/29/2021 2021   Cholesterol      50 - 200 mg/dL 271 (H) 164   Triglycerides      <=150 mg/dL 60 67   HDL      >=40 mg/dL 74 65   LDL Calculated      0 - 100 mg/dL 185 (H) 86     Cholesterol is much improved  Continue cholesterol medication as prescribed  - Lipid panel; Future  - CBC; Future  - Basic metabolic panel; Future  - CK; Future    2  Prediabetes    Most recent A1c was 5 7 % on 3/29/2021  Watch carbohydrate intake  Exercise regularly  - Hemoglobin A1C; Future    3  Paresthesias with subjective weakness    Recommend EMG of the left upper extremity  Discussed use of heat and massage for her left trapezius muscle which is hypertonic and tight  - EMG 1 Upper/1 Lower Neuropathy; Future        Return in about 6 months (around 2022) for Follow-up  Chief Complaint:     Chief Complaint   Patient presents with    Follow-up     6 months     numbness     arms/ pins and needle onset few month and worsen     Medicare Wellness Visit        History of Present Illness:       Patient presents for routine follow-up  Her cholesterol is much improved since starting atorvastatin 20 mg  She is tolerating medication well  Her LDL is dropped 100 points  Has been having some issues with left upper extremity pain and numbness  She is more active with her left upper extremity in carries more weight on that side  Denies any neck pain with radiation  A lot times the pain and numbness goes from her hand up to her elbow  Review of Systems:     Review of Systems   Constitutional: Negative  Respiratory: Negative  Cardiovascular: Negative  Gastrointestinal: Negative  Musculoskeletal: Positive for arthralgias   Negative for neck pain and neck stiffness  Neurological: Positive for numbness  Objective:     /70 (BP Location: Left arm, Patient Position: Sitting, Cuff Size: Standard)   Pulse 82   Temp 98 3 °F (36 8 °C) (Tympanic)   Resp 20   Ht 5' (1 524 m)   Wt 70 8 kg (156 lb)   SpO2 97%   BMI 30 47 kg/m²     Physical Exam  Constitutional:       General: She is not in acute distress  Appearance: She is not ill-appearing  Cardiovascular:      Rate and Rhythm: Normal rate and regular rhythm  Heart sounds: No murmur heard  Pulmonary:      Effort: Pulmonary effort is normal  No respiratory distress  Breath sounds: No wheezing  Abdominal:      General: Bowel sounds are normal  There is no distension  Tenderness: There is no abdominal tenderness  Musculoskeletal:         General: Deformity (left trapezius hypertonicity; lumbar paraspinal hypertonicity bilateral) present  Right lower leg: No edema  Left lower leg: No edema  Neurological:      Mental Status: She is alert  Mental status is at baseline  Cranial Nerves: No cranial nerve deficit  Motor: No weakness        Gait: Gait normal        Jeff Ayala,   MEDICAL [id number]

## 2022-06-20 ENCOUNTER — OFFICE VISIT (OUTPATIENT)
Dept: OBGYN CLINIC | Facility: CLINIC | Age: 67
End: 2022-06-20
Payer: COMMERCIAL

## 2022-06-20 ENCOUNTER — HOSPITAL ENCOUNTER (OUTPATIENT)
Dept: MAMMOGRAPHY | Facility: CLINIC | Age: 67
Discharge: HOME | End: 2022-06-20
Payer: COMMERCIAL

## 2022-06-20 VITALS
DIASTOLIC BLOOD PRESSURE: 88 MMHG | SYSTOLIC BLOOD PRESSURE: 128 MMHG | WEIGHT: 157 LBS | HEIGHT: 60 IN | BODY MASS INDEX: 30.82 KG/M2

## 2022-06-20 VITALS — BODY MASS INDEX: 28.71 KG/M2 | HEIGHT: 62 IN | WEIGHT: 156 LBS

## 2022-06-20 DIAGNOSIS — Z01.419 ENCOUNTER FOR GYNECOLOGICAL EXAMINATION WITHOUT ABNORMAL FINDING: Primary | ICD-10-CM

## 2022-06-20 DIAGNOSIS — Z78.0 ASYMPTOMATIC POSTMENOPAUSAL STATUS: ICD-10-CM

## 2022-06-20 DIAGNOSIS — Z12.31 ENCOUNTER FOR SCREENING MAMMOGRAM FOR MALIGNANT NEOPLASM OF BREAST: ICD-10-CM

## 2022-06-20 DIAGNOSIS — Z09 ENCOUNTER FOR FOLLOW-UP: ICD-10-CM

## 2022-06-20 DIAGNOSIS — R87.810 CERVICAL HIGH RISK HPV (HUMAN PAPILLOMAVIRUS) TEST POSITIVE: ICD-10-CM

## 2022-06-20 PROBLEM — E66.3 OVERWEIGHT (BMI 25.0-29.9): Chronic | Status: RESOLVED | Noted: 2018-02-15 | Resolved: 2022-06-20

## 2022-06-20 PROCEDURE — G0476 HPV COMBO ASSAY CA SCREEN: HCPCS | Performed by: NURSE PRACTITIONER

## 2022-06-20 PROCEDURE — 77063 BREAST TOMOSYNTHESIS BI: CPT

## 2022-06-20 PROCEDURE — 1160F RVW MEDS BY RX/DR IN RCRD: CPT | Performed by: NURSE PRACTITIONER

## 2022-06-20 PROCEDURE — 99397 PER PM REEVAL EST PAT 65+ YR: CPT | Performed by: NURSE PRACTITIONER

## 2022-06-20 PROCEDURE — G0145 SCR C/V CYTO,THINLAYER,RESCR: HCPCS | Performed by: NURSE PRACTITIONER

## 2022-06-20 PROCEDURE — 77067 SCR MAMMO BI INCL CAD: CPT

## 2022-06-20 PROCEDURE — 3008F BODY MASS INDEX DOCD: CPT | Performed by: NURSE PRACTITIONER

## 2022-06-20 NOTE — PATIENT INSTRUCTIONS
Breast Self Exam for Women   AMBULATORY CARE:   A breast self-exam (BSE)  is a way to check your breasts for lumps and other changes  Regular BSEs can help you know how your breasts normally look and feel  Most breast lumps or changes are not cancer, but you should always have them checked by a healthcare provider  Why you should do a BSE:  Breast cancer is the most common type of cancer in women  Even if you have mammograms, you may still want to do a BSE regularly  If you know how your breasts normally feel and look, it may help you know when to contact your healthcare provider  Mammograms can miss some cancers  You may find a lump during a BSE that did not show up on a mammogram   When you should do a BSE:  If you have periods, you may want to do your BSE 1 week after your period ends  This is the time when your breasts may be the least swollen, lumpy, or tender  You can do regular BSEs even if you are breastfeeding or have breast implants  Call your doctor if:   You find any lumps or changes in your breasts  You have breast pain or fluid coming from your nipples  You have questions or concerns about your condition or care  How to do a BSE:       Look at your breasts in a mirror  Look at the size and shape of each breast and nipple  Check for swelling, lumps, dimpling, scaly skin, or other skin changes  Look for nipple changes, such as a nipple that is painful or beginning to pull inward  Gently squeeze both nipples and check to see if fluid (that is not breast milk) comes out of them  If you find any of these or other breast changes, contact your healthcare provider  Check your breasts while you sit or  the following 3 positions:    Gile your arms down at your sides  Raise your hands and join them behind your head  Put firm pressure with your hands on your hips  Bend slightly forward while you look at your breasts in the mirror  Lie down and feel your breasts    When you lie down, your breast tissue spreads out evenly over your chest  This makes it easier for you to feel for lumps and anything that may not be normal for your breasts  Do a BSE on one breast at a time  Place a small pillow or towel under your left shoulder  Put your left arm behind your head  Use the 3 middle fingers of your right hand  Use your fingertip pads, on the top of your fingers  Your fingertip pad is the most sensitive part of your finger  Use small circles to feel your breast tissue  Use your fingertip pads to make dime-sized, overlapping circles on your breast and armpits  Use light, medium, and firm pressure  First, press lightly  Second, press with medium pressure to feel a little deeper into the breast  Last, use firm pressure to feel deep within your breast     Examine your entire breast area  Examine the breast area from above the breast to below the breast where you feel only ribs  Make small circles with your fingertips, starting in the middle of your armpit  Make circles going up and down the breast area  Continue toward your breast and all the way across it  Examine the area from your armpit all the way over to the middle of your chest (breastbone)  Stop at the middle of your chest     Move the pillow or towel to your right shoulder, and put your right arm behind your head  Use the 3 fingertip pads of your left hand, and repeat the above steps to do a BSE on your right breast     What else you can do to check for breast problems or cancer:  Talk to your healthcare provider about mammograms  A mammogram is an x-ray of your breasts to screen for breast cancer or other problems  Your provider can tell you the benefits and risks of mammograms  The first mammogram is usually at age 39 or 48  Your provider may recommend you start at 36 or younger if your risk for breast cancer is high  Mammograms usually continue every 1 to 2 years until age 76         Follow up with your doctor as directed:  Write down your questions so you remember to ask them during your visits  © Copyright [employer] 2022 Information is for End User's use only and may not be sold, redistributed or otherwise used for commercial purposes  All illustrations and images included in CareNotes® are the copyrighted property of A D A M , Inc  or Rachele Stahl  The above information is an  only  It is not intended as medical advice for individual conditions or treatments  Talk to your doctor, nurse or pharmacist before following any medical regimen to see if it is safe and effective for you

## 2022-06-20 NOTE — PROGRESS NOTES
Patient presents for: yearly    Menarche- 14  Last Pap Smear- 04/19/2021  Hx of abnormal paps- yes  Birth control- hysterectomy    Mammogram- 06/20/2022  DXA- 05/20/21  Colonoscopy- 05/20/19    Smoking status- never  Last sexual activity-No   Family history of uterine, ovarian, cervical or breast cancer-  Breast cancer sister     Concerns-    Pt  Would like to know status of HPV

## 2022-06-20 NOTE — PROGRESS NOTES
Diagnoses and all orders for this visit:    Encounter for gynecological examination without abnormal finding  -     Liquid-based pap, screening    Asymptomatic postmenopausal status    Cervical high risk HPV (human papillomavirus) test positive  -     Liquid-based pap, screening    Encounter for screening mammogram for malignant neoplasm of breast        Call as needed, encouraged calcium/vit D in her diet, call with any PMB, all questions answered        Pleasant 79 y o  postmenopausal female here for annual exam  She denies postmenopausal bleeding  +history of HPV+ 2018, 2019 and 2020, last colpo 2018 nml, Pap normal in 2021 but no HPV was done  Pap with cotest done today  Denies vaginal issues  Denies pelvic pain  Denies postmenopausal issues  NOT Sexually active  Colonoscopy due 2024  DXA nml in 2021  Mammogram done today +family hx of breast cancer with her sister    Past Medical History:   Diagnosis Date    Ovarian cyst     Postmenopausal atrophic vaginitis     Uterine prolapse      Past Surgical History:   Procedure Laterality Date    BREAST BIOPSY Left     benign- does not recall when    COLONOSCOPY  2014    HYSTERECTOMY  1986    PARTIAL HYSTERECTOMY      supracervical    LA COLONOSCOPY FLX DX W/COLLJ SPEC WHEN PFRMD N/A 2/20/2019    Procedure: COLONOSCOPY;  Surgeon: Farhana Mcelroy MD;  Location: [facility];   Service: Gastroenterology     Family History   Problem Relation Age of Onset    No Known Problems Mother     No Known Problems Father     Breast cancer Sister 48    Diabetes Family     Hypertension Family     Diabetes Other     Hypertension Other     No Known Problems Maternal Grandmother     No Known Problems Paternal Grandmother     No Known Problems Sister     No Known Problems Maternal Aunt     No Known Problems Maternal Aunt     No Known Problems Maternal Aunt     No Known Problems Paternal Aunt     No Known Problems Paternal Aunt     Colon cancer Neg Hx     Ovarian cancer Neg Hx     Uterine cancer Neg Hx     Cervical cancer Neg Hx      Social History     Tobacco Use    Smoking status: Never Smoker    Smokeless tobacco: Never Used   Vaping Use    Vaping Use: Never used   Substance Use Topics    Alcohol use: No    Drug use: No       Current Outpatient Medications:     atorvastatin (LIPITOR) 20 mg tablet, TAKE 1 TABLET BY MOUTH EVERY DAY WITH DINNER, Disp: 90 tablet, Rfl: 1    cholecalciferol (VITAMIN D3) 1,000 units tablet, Take 1,000 Units by mouth daily, Disp: , Rfl:     CVS OMEGA-3 KRILL  MG CAPS, Take by mouth daily , Disp: , Rfl:     Multiple Vitamin (MULTIVITAMIN) capsule, Take 1 capsule by mouth daily, Disp: , Rfl:   Patient Active Problem List    Diagnosis Date Noted    Visual floaters, left 2020    Abnormal bruising 2020    Hypercholesterolemia 02/15/2018    Prediabetes 02/15/2018    Cervical high risk HPV (human papillomavirus) test positive 08/10/2017       No Known Allergies    OB History    Para Term  AB Living   1 1 1     1   SAB IAB Ectopic Multiple Live Births           1      # Outcome Date GA Lbr Piero/2nd Weight Sex Delivery Anes PTL Lv   1 Term              Son has 4 grandchildren, oldest is 34 and youngest is 12  Resigned from [address] at [employer] but works at [employer] now    Vitals:    22 1005   BP: 128/88   BP Location: Left arm   Patient Position: Sitting   Cuff Size: Standard   Weight: 71 2 kg (157 lb)   Height: 5' (1 524 m)     Body mass index is 30 66 kg/m²  Review of Systems   Constitutional: Negative for chills, fatigue, fever and unexpected weight change  Respiratory: Negative for shortness of breath  Gastrointestinal: Negative for anal bleeding, blood in stool, constipation and diarrhea  Genitourinary: Negative for difficulty urinating, dysuria and hematuria  Physical Exam   Constitutional: She appears well-developed and well-nourished  No distress     HENT:   Head: Normocephalic  Neck: Normal range of motion  Neck supple  Pulmonary: Effort normal   Breasts: bilateral without masses, skin changes or nipple discharge  Bilaterally soft and warm to touch  No areas of erythema or pain  Abdominal: Soft  Pelvic exam was performed with patient supine  No labial fusion  There is no rash, tenderness, lesion or injury on the right labia  There is no rash, tenderness, lesion or injury on the left labia  Uterus is surgically absent  Cervix exhibits no motion tenderness, no discharge and no friability  Right adnexum displays no mass, no tenderness and no fullness  Left adnexum displays no mass, no tenderness and no fullness  No erythema or tenderness in the vagina  No foreign body in the vagina  No signs of injury around the vagina  No vaginal discharge found  Lymphadenopathy:        Right: No inguinal adenopathy present  Left: No inguinal adenopathy present

## 2022-06-22 ENCOUNTER — TELEPHONE (OUTPATIENT)
Dept: INTERNAL MEDICINE CLINIC | Facility: CLINIC | Age: 67
End: 2022-06-22

## 2022-06-22 LAB
HPV HR 12 DNA CVX QL NAA+PROBE: NEGATIVE
HPV16 DNA CVX QL NAA+PROBE: NEGATIVE
HPV18 DNA CVX QL NAA+PROBE: NEGATIVE

## 2022-06-23 LAB
LAB AP GYN PRIMARY INTERPRETATION: NORMAL
Lab: NORMAL

## 2022-06-29 ENCOUNTER — TELEPHONE (OUTPATIENT)
Dept: INTERNAL MEDICINE CLINIC | Facility: CLINIC | Age: 67
End: 2022-06-29

## 2022-06-29 ENCOUNTER — PROCEDURE VISIT (OUTPATIENT)
Dept: NEUROLOGY | Facility: CLINIC | Age: 67
End: 2022-06-29
Payer: COMMERCIAL

## 2022-06-29 DIAGNOSIS — R53.1 PARESTHESIAS WITH SUBJECTIVE WEAKNESS: ICD-10-CM

## 2022-06-29 DIAGNOSIS — R20.2 PARESTHESIAS WITH SUBJECTIVE WEAKNESS: ICD-10-CM

## 2022-06-29 PROCEDURE — 95886 MUSC TEST DONE W/N TEST COMP: CPT | Performed by: PHYSICAL MEDICINE & REHABILITATION

## 2022-06-29 PROCEDURE — 95912 NRV CNDJ TEST 11-12 STUDIES: CPT | Performed by: PHYSICAL MEDICINE & REHABILITATION

## 2022-06-29 NOTE — PROGRESS NOTES
EMG 1 Upper/1 Lower Neuropathy     Date/Time 6/29/2022 10:50 AM     Performed by  Durga Mccain MD     Authorized by Martha Suggs DO                [facility] AT [address]  [phone]    Electromyography & Nerve Conduction Studies Report          Full Name: Yosvany Matamoros Gender: Female  MRN: [id number] YOB: 1955      Visit Date: 6/29/2022 10:12 AM  Age: 79 Years  Examining Physician: Durga Mccain MD   Referring Physician: DR Malissa Hsieh History: 79years old right-handed female presents with complaints of left upper extremity pain and numbness  Denies any radicular symptoms  A lot of times, the pain and numbness goes from the hands up to her elbow  She also complains of low back pain, progressively worsening over the last few months  Patient is being evaluated for a peripheral neuropathy versus focal neuropathy  TEMP 32      Sensory Nerve Conduction Study       Nerve / Sites Rec  Site Onset Lat Peak Lat  Amp Segments Distance Velocity     ms ms µV  cm m/s   L Median - Dig II (Antidromic)      Wrist Index 2 1 3 0 57 3 Wrist - Index 13 61      Ref  ?3 5 ? 20 0 Ref  ?50   L Ulnar - Dig V (Antidromic)      Wrist Dig V 1 9 2 8 46 0 Wrist - Dig V 11 57      Ref  ?3 1 ? 17 0 Ref  ?50   L Radial - Superficial (Antidromic)      Forearm Wrist 1 6 2 2 31 8 Forearm - Wrist 10 64      Ref  ?2 9 ? 15 0 Ref  ?50   L Sural - (Antidromic)      Calf Ankle 2 2 2 8 11 8 Calf - Ankle 14 63      Ref  ?4 4 ? 6 0 Ref  ?40   L Superficial peroneal - (Antidromic)      Lat leg Ankle 2 1 2 6 6 6 Lat leg - Ankle 14 67      Ref  ?4 4 ? 6 0 Ref  ?40       Motor Nerve Conduction Study       Nerve / Sites Muscle Latency Ref  Amplitude Ref  Segments Distance Lat Diff Velocity Ref       ms ms mV mV  cm ms m/s m/s   L Median - APB      Wrist APB 3 0 ?4 4 7 2 ?4 0 Wrist - APB 7         Elbow APB 6 7  6 8  Elbow - Wrist 19 3 69 52 ?49   L Ulnar - ADM      Wrist ADM 2 3 ?3 3 11 1 ?6 0 Wrist - ADM 7         B  Elbow ADM 5 9  11 1  B  Elbow - Wrist 18 3 58 50 ?49      A  Elbow ADM 7 5  10 8  A  Elbow - B  Elbow 10 1 62 62 ?49   L Peroneal - EDB      Ankle EDB 3 0 ?6 5 8 2 ?2 0 Ankle - EDB 9         B  Fib Head EDB 9 2  8 1  B  Fib Head - Ankle 28 6 15 46 ?44      A  Fib Head EDB 11 1  8 0  A  Fib Head - B  Fib Head 10 1 92 52 ?44   L Tibial - AH      Ankle AH 3 0 ?5 8 6 6 ?4 0 Ankle - AH 9         Knee AH 10 4  6 2  Knee - Ankle 35 7 38 47 ?41       F Waves       Nerve F Latency Ref  ms ms   L Peroneal - EDB 45 3 ?56 0   L Tibial - AH 47 6 ?56 0   L Median - APB 24 9 ?31 0   L Ulnar - ADM 25 2 ?32 0       H Reflex       Nerve H Latency    ms   L Tibial - Soleus 32 5   R Tibial - Soleus 28  3       EMG Summary Table     Spontaneous MUAP Recruitment   Muscle Nerve Roots IA Fib PSW Fasc H F  Dur  Amp PPP Config Pattern   L  First dorsal interosseous Ulnar C8-T1 NL None None None None NL NL None NL NL   L  Deltoid Axillary C5-C6 NL None None None None NL NL None NL NL   L  Biceps brachii Musculocut  C5-C6 NL None None None None NL NL None NL NL   L  Triceps brachii Radial C6-C8 NL None None None None NL NL None NL NL   L  Pronator teres Median C6-C7 NL None None None None NL NL None NL NL   L  Abductor pollicis brevis Median V7-M7 NL None None None None NL NL None NL NL   L  Cervical paraspinals (low)  - NL None None None None NL NL None NL NL   L  Gluteus medius Superior gluteal L4-S1 NL None None None None NL NL None NL NL   L  Lumbar paraspinals Spinal L1-L5 NL None None None None NL NL None NL NL   L  Gastrocnemius (Medial head) Tibial S1-S2 NL None None None None NL NL None NL NL   L  Quadriceps Femoral L2-L4 NL None None None None NL NL None NL NL   L  Extensor digitorum brevis Tibial L5-S1 NL None None None None NL NL None NL NL   L   Tibialis anterior Deep peroneal (Fibular) L4-L5 NL None None None None NL NL None NL NL Summary        Motor and sensory conduction studies were performed on the left median , peroneal, tibial and ulnar nerves  The distal motor latencies were normal  The motor action potential amplitudes were normal  Motor conduction velocities were normal including conduction velocity of the ulnar nerve across the elbow and peroneal nerve across the fibular head  The left  median and ulnar F waves were normal   The left peroneal and tibial F-waves were normal   The bilateral H soleus responses were normal     The left median and ulnar sensory action potentials were normal   The left superficial radial, sural and superficial peroneal sensory action potentials were normal     Concentric needle EMG was performed on various distal and proximal muscles of the left upper and lower extremity including APB, FDI, pronator teres, deltoid, biceps, triceps , low cervical paraspinal region, gluteus medius, vastus lateralis, tibialis anterior, medial gastrocnemius, extensor digitorum brevis and lumbosacral paraspinals  There was no evidence of active denervation any of the muscles tested  The compound motor unit action potentials were of normal configuration with interference patterns being full or full for effort  Impression:    Normal study  1 There is no electrophysiologic evidence of a cervical or lumbosacral radiculopathy  2 There is no electrophysiologic evidence of a peripheral neuropathy

## 2022-06-29 NOTE — TELEPHONE ENCOUNTER
----- Message from Von Woodard DO sent at 6/29/2022 11:08 AM EDT -----  Nerve conduction study was normal  There was no evidence of nerve compression or peripheral neuropathy

## 2022-07-27 DIAGNOSIS — J06.9 ACUTE URI: ICD-10-CM

## 2022-07-27 RX ORDER — PROMETHAZINE HYDROCHLORIDE AND CODEINE PHOSPHATE 6.25; 1 MG/5ML; MG/5ML
5 SYRUP ORAL EVERY 4 HOURS PRN
Qty: 473 ML | Refills: 1 | Status: SHIPPED | OUTPATIENT
Start: 2022-07-27 | End: 2022-08-01 | Stop reason: SDUPTHER

## 2022-07-27 NOTE — TELEPHONE ENCOUNTER
Patient states she has a cough that is at its worst at night   She was prescribed promethazine-codeine (PHENERGAN WITH CODEINE) 6 25-10 mg/5 mL syrup  In the past and would like to know if the doctor would send over a script for this cough syrup to [facility], [address] scheduled for 6 mo check up on 9/19

## 2022-07-29 ENCOUNTER — TELEPHONE (OUTPATIENT)
Dept: OTHER | Facility: OTHER | Age: 67
End: 2022-07-29

## 2022-07-29 NOTE — TELEPHONE ENCOUNTER
Would like to set up a follow up appointment , patient called office Monday and was supposed to get a call back  Kindly call patient back at your earliest convenience

## 2022-08-01 ENCOUNTER — TELEPHONE (OUTPATIENT)
Dept: INTERNAL MEDICINE CLINIC | Facility: CLINIC | Age: 67
End: 2022-08-01

## 2022-08-01 RX ORDER — PROMETHAZINE HYDROCHLORIDE AND CODEINE PHOSPHATE 6.25; 1 MG/5ML; MG/5ML
5 SYRUP ORAL EVERY 4 HOURS PRN
Qty: 473 ML | Refills: 1 | Status: SHIPPED | OUTPATIENT
Start: 2022-08-01 | End: 2022-09-19 | Stop reason: CLARIF

## 2022-08-01 NOTE — TELEPHONE ENCOUNTER
Pt wanted to let dr Paul Parr know, moved her appt to to 08/15 @ 10:45    Pt's  has been noticing that she stops breathing when she sleeps and has been waking her up  Is available to speak with him    Also needs promethazine- codeine sent to [facility], sent to [facility]

## 2022-08-01 NOTE — TELEPHONE ENCOUNTER
PT  SAID    HER  RX  WAS  SENT  TO  THE   WRONG  PHARM   WANTS  IT  SENT  TO  [facility]  NOT  [address]

## 2022-08-15 ENCOUNTER — OFFICE VISIT (OUTPATIENT)
Dept: INTERNAL MEDICINE CLINIC | Facility: CLINIC | Age: 67
End: 2022-08-15
Payer: COMMERCIAL

## 2022-08-15 VITALS
RESPIRATION RATE: 18 BRPM | HEIGHT: 60 IN | HEART RATE: 68 BPM | TEMPERATURE: 96.8 F | BODY MASS INDEX: 31.1 KG/M2 | DIASTOLIC BLOOD PRESSURE: 72 MMHG | SYSTOLIC BLOOD PRESSURE: 122 MMHG | WEIGHT: 158.4 LBS | OXYGEN SATURATION: 98 %

## 2022-08-15 DIAGNOSIS — G47.33 OSA (OBSTRUCTIVE SLEEP APNEA): Primary | ICD-10-CM

## 2022-08-15 PROCEDURE — 1160F RVW MEDS BY RX/DR IN RCRD: CPT | Performed by: INTERNAL MEDICINE

## 2022-08-15 PROCEDURE — 99215 OFFICE O/P EST HI 40 MIN: CPT | Performed by: INTERNAL MEDICINE

## 2022-08-15 NOTE — PROGRESS NOTES
[facility] OF [address] [place] [address]    NAME: Theodoro Fothergill  AGE: 79 y o  SEX: female  : 1955     DATE: 8/15/2022     Assessment and Plan:     1  MARGARITA (obstructive sleep apnea)    Based off her symptoms, my suspicion for sleep apnea is high  Would like to check a home study  Reviewed pathophysiology of MARGARITA  Briefly discussed treatment for MARGARITA if her home study confirms that she has it      - Home Study; Future         History of Present Illness:     Renetta presents for follow-up   has been noticing over the past couple months that she stops breathing in her sleep  Happens for 15 seconds or more  Patient does admit to un-refreshing sleep and being tired a lot  She does snore  A lot of times, they end up sleeping in different rooms based off of her husbands medical conditions  She also admits to impaired concentration/memory  But not sure if that is just a byproduct of getting older  Review of Systems:     Review of Systems   Constitutional: Negative  HENT: Negative  Respiratory: Positive for apnea  Negative for cough, shortness of breath and wheezing  Cardiovascular: Negative  Gastrointestinal: Negative  Neurological: Negative for headaches  Objective:     /72 (BP Location: Left arm, Patient Position: Sitting, Cuff Size: Standard)   Pulse 68   Temp (!) 96 8 °F (36 °C) (Temporal)   Resp 18   Ht 5' (1 524 m)   Wt 71 8 kg (158 lb 6 4 oz)   SpO2 98%   BMI 30 94 kg/m²     Physical Exam  Vitals reviewed  Constitutional:       General: She is not in acute distress  Appearance: She is well-developed  She is not diaphoretic  HENT:      Mouth/Throat:      Comments: Oropharynx is not crowded  Neck:      Thyroid: No thyromegaly  Vascular: No JVD  Comments: Has a normal neck circumference  Cardiovascular:      Rate and Rhythm: Normal rate and regular rhythm  Heart sounds: Normal heart sounds  No murmur heard    Pulmonary: Effort: Pulmonary effort is normal  No respiratory distress  Breath sounds: Normal breath sounds  No wheezing or rales  Abdominal:      General: Bowel sounds are normal  There is no distension  Palpations: Abdomen is soft  Tenderness: There is no abdominal tenderness  Musculoskeletal:      Cervical back: Neck supple  Right lower leg: No edema  Left lower leg: No edema  Lymphadenopathy:      Cervical: No cervical adenopathy  Skin:     General: Skin is warm and dry  Neurological:      Mental Status: She is alert     Psychiatric:         Mood and Affect: Mood normal          Behavior: Behavior normal        Valentino Mcguire DO  MEDICAL [id number]

## 2022-08-22 ENCOUNTER — TELEPHONE (OUTPATIENT)
Dept: SLEEP CENTER | Facility: CLINIC | Age: 67
End: 2022-08-22

## 2022-08-22 NOTE — TELEPHONE ENCOUNTER
----- Message from Franky Pacheco MD sent at 8/21/2022  7:25 PM EDT -----  Pirmo Hopson      ----- Message -----  From: Steve Pavon  Sent: 8/16/2022  10:58 AM EDT  To: Sleep Medicine [place] Provider    This Home sleep study needs approval      If approved please sign and return to clerical pool  If denied please include reasons why  Also provide alternative testing if warranted  Please sign and return to clerical pool

## 2022-08-24 ENCOUNTER — HOSPITAL ENCOUNTER (OUTPATIENT)
Dept: SLEEP CENTER | Facility: CLINIC | Age: 67
Discharge: HOME | End: 2022-08-24
Payer: COMMERCIAL

## 2022-08-24 DIAGNOSIS — G47.33 OSA (OBSTRUCTIVE SLEEP APNEA): ICD-10-CM

## 2022-08-24 PROCEDURE — G0399 HOME SLEEP TEST/TYPE 3 PORTA: HCPCS

## 2022-08-26 NOTE — PROGRESS NOTES
Home Sleep Study Documentation    HOME STUDY DEVICE: Noxturnal no                                           Dori G3 yes      Pre-Sleep Home Study:    Set-up and instructions performed by: WILLOW Enriquez RPSGT    Technician performed demonstration for Patient: yes    Return demonstration performed by Patient: yes    Written instructions provided to Patient: yes    Patient signed consent form: yes        Post-Sleep Home Study:    Additional comments by Patient: none    Home Sleep Study Failed:no:    Failure reason: N/A    Reported or Detected: N/A    Scored by: WILLOW Enriquez RPSGT

## 2022-08-29 PROCEDURE — 95806 SLEEP STUDY UNATT&RESP EFFT: CPT | Performed by: INTERNAL MEDICINE

## 2022-08-31 ENCOUNTER — TELEPHONE (OUTPATIENT)
Dept: PULMONOLOGY | Facility: CLINIC | Age: 67
End: 2022-08-31

## 2022-08-31 ENCOUNTER — TELEPHONE (OUTPATIENT)
Dept: INTERNAL MEDICINE CLINIC | Facility: CLINIC | Age: 67
End: 2022-08-31

## 2022-08-31 DIAGNOSIS — G47.33 OSA (OBSTRUCTIVE SLEEP APNEA): Primary | ICD-10-CM

## 2022-08-31 NOTE — TELEPHONE ENCOUNTER
----- Message from Britt Rose DO sent at 8/31/2022  8:43 AM EDT -----  Home study confirms severe sleep apnea  I put in orders through paract[facility] to get her an auto-CPAP to treat this  Will need follow-up with sleep specialist as well   I put in a referral

## 2022-08-31 NOTE — TELEPHONE ENCOUNTER
S/w Pt results and orders reviewed  Pt verbalized understanding of same  Will call if questions or concerns arise or further assistance is needed

## 2022-08-31 NOTE — TELEPHONE ENCOUNTER
Left message for pt to call office to schedule from referral for sleep study consult with dr Michael Beck

## 2022-09-09 ENCOUNTER — APPOINTMENT (OUTPATIENT)
Dept: LAB | Facility: CLINIC | Age: 67
End: 2022-09-09
Payer: COMMERCIAL

## 2022-09-09 DIAGNOSIS — E78.00 HYPERCHOLESTEROLEMIA: ICD-10-CM

## 2022-09-09 DIAGNOSIS — R73.03 PREDIABETES: Chronic | ICD-10-CM

## 2022-09-09 LAB
ANION GAP SERPL CALCULATED.3IONS-SCNC: 4 MMOL/L (ref 4–13)
BUN SERPL-MCNC: 14 MG/DL (ref 5–25)
CALCIUM SERPL-MCNC: 9 MG/DL (ref 8.3–10.1)
CHLORIDE SERPL-SCNC: 111 MMOL/L (ref 96–108)
CHOLEST SERPL-MCNC: 151 MG/DL
CK MB SERPL-MCNC: 2.1 NG/ML (ref 0–5)
CK MB SERPL-MCNC: <1 % (ref 0–2.5)
CK SERPL-CCNC: 235 U/L (ref 26–192)
CO2 SERPL-SCNC: 26 MMOL/L (ref 21–32)
CREAT SERPL-MCNC: 0.98 MG/DL (ref 0.6–1.3)
ERYTHROCYTE [DISTWIDTH] IN BLOOD BY AUTOMATED COUNT: 13.2 % (ref 11.6–15.1)
EST. AVERAGE GLUCOSE BLD GHB EST-MCNC: 117 MG/DL
GFR SERPL CREATININE-BSD FRML MDRD: 59 ML/MIN/1.73SQ M
GLUCOSE P FAST SERPL-MCNC: 82 MG/DL (ref 65–99)
HBA1C MFR BLD: 5.7 %
HCT VFR BLD AUTO: 38.5 % (ref 34.8–46.1)
HDLC SERPL-MCNC: 70 MG/DL
HGB BLD-MCNC: 12 G/DL (ref 11.5–15.4)
LDLC SERPL CALC-MCNC: 70 MG/DL (ref 0–100)
MCH RBC QN AUTO: 30.2 PG (ref 26.8–34.3)
MCHC RBC AUTO-ENTMCNC: 31.2 G/DL (ref 31.4–37.4)
MCV RBC AUTO: 97 FL (ref 82–98)
NONHDLC SERPL-MCNC: 81 MG/DL
PLATELET # BLD AUTO: 361 THOUSANDS/UL (ref 149–390)
PMV BLD AUTO: 10.1 FL (ref 8.9–12.7)
POTASSIUM SERPL-SCNC: 4 MMOL/L (ref 3.5–5.3)
RBC # BLD AUTO: 3.98 MILLION/UL (ref 3.81–5.12)
SODIUM SERPL-SCNC: 141 MMOL/L (ref 135–147)
TRIGL SERPL-MCNC: 53 MG/DL
WBC # BLD AUTO: 5.16 THOUSAND/UL (ref 4.31–10.16)

## 2022-09-09 PROCEDURE — 80061 LIPID PANEL: CPT

## 2022-09-09 PROCEDURE — 82553 CREATINE MB FRACTION: CPT

## 2022-09-09 PROCEDURE — 83036 HEMOGLOBIN GLYCOSYLATED A1C: CPT

## 2022-09-09 PROCEDURE — 36415 COLL VENOUS BLD VENIPUNCTURE: CPT

## 2022-09-09 PROCEDURE — 85027 COMPLETE CBC AUTOMATED: CPT

## 2022-09-09 PROCEDURE — 82550 ASSAY OF CK (CPK): CPT

## 2022-09-09 PROCEDURE — 80048 BASIC METABOLIC PNL TOTAL CA: CPT

## 2022-09-19 ENCOUNTER — OFFICE VISIT (OUTPATIENT)
Dept: INTERNAL MEDICINE CLINIC | Facility: CLINIC | Age: 67
End: 2022-09-19
Payer: COMMERCIAL

## 2022-09-19 VITALS
HEART RATE: 80 BPM | HEIGHT: 60 IN | WEIGHT: 159 LBS | OXYGEN SATURATION: 99 % | DIASTOLIC BLOOD PRESSURE: 80 MMHG | TEMPERATURE: 97.4 F | RESPIRATION RATE: 20 BRPM | BODY MASS INDEX: 31.22 KG/M2 | SYSTOLIC BLOOD PRESSURE: 122 MMHG

## 2022-09-19 DIAGNOSIS — Z23 ENCOUNTER FOR IMMUNIZATION: ICD-10-CM

## 2022-09-19 DIAGNOSIS — E78.00 HYPERCHOLESTEROLEMIA: Chronic | ICD-10-CM

## 2022-09-19 DIAGNOSIS — R73.03 PREDIABETES: Primary | Chronic | ICD-10-CM

## 2022-09-19 DIAGNOSIS — G47.33 OSA (OBSTRUCTIVE SLEEP APNEA): ICD-10-CM

## 2022-09-19 PROCEDURE — 99214 OFFICE O/P EST MOD 30 MIN: CPT | Performed by: INTERNAL MEDICINE

## 2022-09-19 PROCEDURE — G0009 ADMIN PNEUMOCOCCAL VACCINE: HCPCS | Performed by: INTERNAL MEDICINE

## 2022-09-19 PROCEDURE — 90677 PCV20 VACCINE IM: CPT | Performed by: INTERNAL MEDICINE

## 2022-09-19 PROCEDURE — 1160F RVW MEDS BY RX/DR IN RCRD: CPT | Performed by: INTERNAL MEDICINE

## 2022-09-19 NOTE — PROGRESS NOTES
[facility]    NAME: Kalie Valentin  AGE: 79 y o  SEX: female  : 1955     DATE: 2022     Assessment and Plan:     1  Prediabetes    Most recent A1c was 5 7 % on 2022  Discussed that losing 10-15 lbs can resolve this  2  Hypercholesterolemia    Total cholesterol now in the 150s when previously it was 271  Continue statin as prescribed  - Lipid panel; Future  - CBC; Future  - Comprehensive metabolic panel; Future    3  MARGARITA (obstructive sleep apnea)    Discussed benefits to underlying CPAP therapy  Follow-up with sleep medicine  4  Encounter for immunization  - Pneumococcal Conjugate Vaccine 20-valent (PCV20)        Return in about 6 months (around 3/19/2023) for Subsequent AWV  History of Present Illness:     Olayinka Holter presents for f/u  Labs are stable  A1c remains in pre-diabetic range at 5 7%  Cholesterol excellent and tolerating statin medication  She was recently diagnosed with severe MARGARITA  She is scared to start CPAP therapy  Has appt with Dr Yudelka Shaikh on 10/7  Dealing with more aches and pains since she is getting older  Review of Systems:     Review of Systems   Constitutional: Positive for fatigue  Negative for chills and fever  Respiratory: Negative  Cardiovascular: Negative  Gastrointestinal: Negative  Musculoskeletal: Positive for arthralgias and back pain  Objective:     /80 (BP Location: Left arm, Patient Position: Sitting, Cuff Size: Standard)   Pulse 80   Temp (!) 97 4 °F (36 3 °C) (Tympanic)   Resp 20   Ht 5' (1 524 m)   Wt 72 1 kg (159 lb)   SpO2 99%   BMI 31 05 kg/m²     Physical Exam  Constitutional:       General: She is not in acute distress  Appearance: She is not ill-appearing  Cardiovascular:      Rate and Rhythm: Normal rate and regular rhythm  Heart sounds: No murmur heard  Pulmonary:      Effort: Pulmonary effort is normal  No respiratory distress  Breath sounds:  No wheezing  Abdominal:      General: Bowel sounds are normal  There is no distension  Tenderness: There is no abdominal tenderness  Musculoskeletal:         General: Deformity (right shoulder crepitus) present  Right lower leg: No edema  Left lower leg: No edema  Neurological:      Mental Status: She is alert         Cynthia Hancock DO  [facility]

## 2022-10-07 ENCOUNTER — CONSULT (OUTPATIENT)
Dept: PULMONOLOGY | Facility: CLINIC | Age: 67
End: 2022-10-07
Payer: COMMERCIAL

## 2022-10-07 ENCOUNTER — TELEPHONE (OUTPATIENT)
Dept: PULMONOLOGY | Facility: CLINIC | Age: 67
End: 2022-10-07

## 2022-10-07 VITALS
OXYGEN SATURATION: 96 % | DIASTOLIC BLOOD PRESSURE: 76 MMHG | TEMPERATURE: 98 F | BODY MASS INDEX: 30.82 KG/M2 | HEART RATE: 80 BPM | WEIGHT: 157 LBS | HEIGHT: 60 IN | SYSTOLIC BLOOD PRESSURE: 110 MMHG

## 2022-10-07 DIAGNOSIS — G47.33 OSA (OBSTRUCTIVE SLEEP APNEA): Primary | ICD-10-CM

## 2022-10-07 DIAGNOSIS — E66.9 OBESITY (BMI 30-39.9): ICD-10-CM

## 2022-10-07 LAB

## 2022-10-07 PROCEDURE — 99204 OFFICE O/P NEW MOD 45 MIN: CPT | Performed by: INTERNAL MEDICINE

## 2022-10-07 NOTE — PROGRESS NOTES
Assessment/Plan:     Diagnoses and all orders for this visit:    MARGARITA (obstructive sleep apnea)  -     Ambulatory Referral to Sleep Medicine  -     CPAP Auto New DME    Obesity (BMI 30-39  9)          Plan for follow up:  Recent home sleep study demonstrating severe obstructive sleep apnea with an AHI of 35 7 with events related hypoxemia the lowest oxygen saturation recorded was 83%   Etiology pathogenesis of obstructive sleep apnea discussed in detail   Consequences of untreated sleep apnea discussed with excessive daytime sleepiness, increased risk for myocardial infarction stroke atrial fibrillation discussed   Various treatment options discussed with weight loss, mandible advancing appliance given her severity of the sleep apnea may not be an option understands and verbalizes CPAP titration study/auto CPAP discussed also discussed regarding inspire for which she would be a candidate , would meet the criteria with the AHI as well as the BMI  She would like to go in for the auto CPAP  Discussed the need for compliance with the CPAP machine   Will have it set up and once he starts using it any concerns she will give the office a call  Follow-up in 3 months with the CPAP download compliance  Recommend weight loss   Cautioned against driving when sleepy  No follow-ups on file  All questions are answered to the patient's satisfaction and understanding  She verbalizes understanding  She is encouraged to call with any further questions or concerns  Portions of the record may have been created with voice recognition software  Occasional wrong word or "sound a like" substitutions may have occurred due to the inherent limitations of voice recognition software  Read the chart carefully and recognize, using context, where substitutions have occurred  a    Electronically Signed by Lio Oneill MD    ______________________________________________________________________    Chief Complaint:   Chief Complaint Patient presents with    Sleep Apnea        Patient ID: Graham Barney is a 79 y o  y o  female has a past medical history of Ovarian cyst, Postmenopausal atrophic vaginitis, Sleep apnea, obstructive, and Uterine prolapse  10/7/2022  Patient presents today for initial visit  Patient is a very pleasant 49-year-old lady here for evaluation for obstructive sleep apnea  She has a currently retired 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  her daily sleep schedule is she states she goes to bed at around midnight usually takes about half an hour to 40 minutes to fall asleep but wakes up several times with nocturia at least about 3-4 times when she is out of the bed she still tired and fatigued usually avoids taking a nap during the daytime no history of any early morning headaches no symptoms related to restless legs      Occupational/Exposure history:  Substitute in the 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  Travel history:  None  Review of Systems   Constitutional: Positive for fatigue  HENT: Negative  Eyes: Negative  Respiratory: Negative  History of loud snoring witnessed apneic spells choking and gasping for air at night  As described by  who has accompanied this office visit   Cardiovascular: Negative  Gastrointestinal: Negative  Endocrine: Negative  Genitourinary: Negative  Musculoskeletal: Negative  Allergic/Immunologic: Negative  Neurological: Negative  Hematological: Negative  Psychiatric/Behavioral: Negative  Social history: She reports that she has never smoked  She has never used smokeless tobacco  She reports that she does not drink alcohol and does not use drugs      Past surgical history:   Past Surgical History:   Procedure Laterality Date    BREAST BIOPSY Left     benign- does not recall when    COLONOSCOPY  2014    HYSTERECTOMY  1986    PARTIAL HYSTERECTOMY      supracervical    CT COLONOSCOPY FLX DX W/COLLJ SPEC WHEN PFRMD N/A 2/20/2019    Procedure: COLONOSCOPY;  Surgeon: Moisés Barnes MD;  Location: [facility]; Service: Gastroenterology     Family history:   Family History   Problem Relation Age of Onset    No Known Problems Mother     No Known Problems Father     Breast cancer Sister 48    Diabetes Family     Hypertension Family     Diabetes Other     Hypertension Other     No Known Problems Maternal Grandmother     No Known Problems Paternal Grandmother     No Known Problems Sister     No Known Problems Maternal Aunt     No Known Problems Maternal Aunt     No Known Problems Maternal Aunt     No Known Problems Paternal Aunt     No Known Problems Paternal Aunt     Colon cancer Neg Hx     Ovarian cancer Neg Hx     Uterine cancer Neg Hx     Cervical cancer Neg Hx        Immunization History   Administered Date(s) Administered    COVID-19 PFIZER VACCINE 0 3 ML IM 04/13/2021, 05/05/2021, 11/19/2021    Pneumococcal Conjugate Vaccine 20-valent (Pcv20), Polysace 09/19/2022    Pneumococcal Polysaccharide PPV23 03/26/2021     Current Outpatient Medications   Medication Sig Dispense Refill    atorvastatin (LIPITOR) 20 mg tablet TAKE 1 TABLET BY MOUTH EVERY DAY WITH DINNER 90 tablet 1    cholecalciferol (VITAMIN D3) 1,000 units tablet Take 1,000 Units by mouth daily      [employer] OMEGA-3 KRILL  MG CAPS Take by mouth daily       Multiple Vitamin (MULTIVITAMIN) capsule Take 1 capsule by mouth daily       No current facility-administered medications for this visit  Allergies: Patient has no known allergies  Objective:  Vitals:    10/07/22 0817   BP: 110/76   Pulse: 80   Temp: 98 °F (36 7 °C)   SpO2: 96%   Weight: 71 2 kg (157 lb)   Height: 5' (1 524 m)   Oxygen Therapy  SpO2: 96 %    Wt Readings from Last 3 Encounters:   10/07/22 71 2 kg (157 lb)   09/19/22 72 1 kg (159 lb)   08/15/22 71 8 kg (158 lb 6 4 oz)     Body mass index is 30 66 kg/m²  Physical Exam  Vitals and nursing note reviewed  Constitutional:       Appearance: She is well-developed  HENT:      Head: Normocephalic and atraumatic  Mouth/Throat:      Comments: Crowded oropharyngeal airways  Eyes:      Conjunctiva/sclera: Conjunctivae normal       Pupils: Pupils are equal, round, and reactive to light  Neck:      Thyroid: No thyromegaly  Vascular: No JVD  Cardiovascular:      Rate and Rhythm: Normal rate and regular rhythm  Heart sounds: Normal heart sounds  No murmur heard  No friction rub  No gallop  Pulmonary:      Effort: Pulmonary effort is normal  No respiratory distress  Breath sounds: Normal breath sounds  No wheezing or rales  Chest:      Chest wall: No tenderness  Musculoskeletal:         General: No tenderness or deformity  Normal range of motion  Cervical back: Normal range of motion and neck supple  Lymphadenopathy:      Cervical: No cervical adenopathy  Skin:     General: Skin is warm and dry  Neurological:      Mental Status: She is alert and oriented to person, place, and time  Diagnostics:  I have personally reviewed pertinent reports      ESS: Total score: 8

## 2022-10-28 DIAGNOSIS — E78.00 HYPERCHOLESTEROLEMIA: Chronic | ICD-10-CM

## 2022-10-28 RX ORDER — ATORVASTATIN CALCIUM 20 MG/1
TABLET, FILM COATED ORAL
Qty: 90 TABLET | Refills: 1 | Status: SHIPPED | OUTPATIENT
Start: 2022-10-28

## 2023-02-20 ENCOUNTER — APPOINTMENT (OUTPATIENT)
Dept: LAB | Facility: CLINIC | Age: 68
End: 2023-02-20

## 2023-02-20 DIAGNOSIS — E78.00 HYPERCHOLESTEROLEMIA: Chronic | ICD-10-CM

## 2023-02-20 LAB
ERYTHROCYTE [DISTWIDTH] IN BLOOD BY AUTOMATED COUNT: 13.2 % (ref 11.6–15.1)
HCT VFR BLD AUTO: 38.9 % (ref 34.8–46.1)
HGB BLD-MCNC: 12.4 G/DL (ref 11.5–15.4)
MCH RBC QN AUTO: 30 PG (ref 26.8–34.3)
MCHC RBC AUTO-ENTMCNC: 31.9 G/DL (ref 31.4–37.4)
MCV RBC AUTO: 94 FL (ref 82–98)
PLATELET # BLD AUTO: 345 THOUSANDS/UL (ref 149–390)
PMV BLD AUTO: 10.5 FL (ref 8.9–12.7)
RBC # BLD AUTO: 4.14 MILLION/UL (ref 3.81–5.12)
WBC # BLD AUTO: 4.31 THOUSAND/UL (ref 4.31–10.16)

## 2023-02-21 LAB
ALBUMIN SERPL BCP-MCNC: 3.6 G/DL (ref 3.5–5)
ALP SERPL-CCNC: 92 U/L (ref 46–116)
ALT SERPL W P-5'-P-CCNC: 23 U/L (ref 12–78)
ANION GAP SERPL CALCULATED.3IONS-SCNC: 4 MMOL/L (ref 4–13)
AST SERPL W P-5'-P-CCNC: 18 U/L (ref 5–45)
BILIRUB SERPL-MCNC: 0.39 MG/DL (ref 0.2–1)
BUN SERPL-MCNC: 16 MG/DL (ref 5–25)
CALCIUM SERPL-MCNC: 9 MG/DL (ref 8.3–10.1)
CHLORIDE SERPL-SCNC: 112 MMOL/L (ref 96–108)
CHOLEST SERPL-MCNC: 160 MG/DL
CO2 SERPL-SCNC: 24 MMOL/L (ref 21–32)
CREAT SERPL-MCNC: 0.92 MG/DL (ref 0.6–1.3)
GFR SERPL CREATININE-BSD FRML MDRD: 64 ML/MIN/1.73SQ M
GLUCOSE P FAST SERPL-MCNC: 83 MG/DL (ref 65–99)
HDLC SERPL-MCNC: 67 MG/DL
LDLC SERPL CALC-MCNC: 84 MG/DL (ref 0–100)
NONHDLC SERPL-MCNC: 93 MG/DL
POTASSIUM SERPL-SCNC: 4.3 MMOL/L (ref 3.5–5.3)
PROT SERPL-MCNC: 7 G/DL (ref 6.4–8.4)
SODIUM SERPL-SCNC: 140 MMOL/L (ref 135–147)
TRIGL SERPL-MCNC: 46 MG/DL

## 2023-03-24 ENCOUNTER — OFFICE VISIT (OUTPATIENT)
Dept: INTERNAL MEDICINE CLINIC | Facility: CLINIC | Age: 68
End: 2023-03-24

## 2023-03-24 ENCOUNTER — TELEPHONE (OUTPATIENT)
Dept: OBGYN CLINIC | Facility: OTHER | Age: 68
End: 2023-03-24

## 2023-03-24 VITALS
WEIGHT: 165 LBS | OXYGEN SATURATION: 98 % | DIASTOLIC BLOOD PRESSURE: 70 MMHG | TEMPERATURE: 97.6 F | HEART RATE: 84 BPM | SYSTOLIC BLOOD PRESSURE: 115 MMHG | RESPIRATION RATE: 20 BRPM | BODY MASS INDEX: 32.39 KG/M2 | HEIGHT: 60 IN

## 2023-03-24 DIAGNOSIS — M79.601 BILATERAL ARM PAIN: ICD-10-CM

## 2023-03-24 DIAGNOSIS — M79.644 BILATERAL THUMB PAIN: ICD-10-CM

## 2023-03-24 DIAGNOSIS — Z12.31 ENCOUNTER FOR SCREENING MAMMOGRAM FOR BREAST CANCER: ICD-10-CM

## 2023-03-24 DIAGNOSIS — M79.645 BILATERAL THUMB PAIN: ICD-10-CM

## 2023-03-24 DIAGNOSIS — M79.602 BILATERAL ARM PAIN: ICD-10-CM

## 2023-03-24 DIAGNOSIS — Z00.00 MEDICARE ANNUAL WELLNESS VISIT, SUBSEQUENT: ICD-10-CM

## 2023-03-24 DIAGNOSIS — R73.03 PREDIABETES: Primary | Chronic | ICD-10-CM

## 2023-03-24 DIAGNOSIS — E78.00 HYPERCHOLESTEROLEMIA: Chronic | ICD-10-CM

## 2023-03-24 PROBLEM — H43.392: Status: RESOLVED | Noted: 2020-07-21 | Resolved: 2023-03-24

## 2023-03-24 PROBLEM — R87.810 CERVICAL HIGH RISK HPV (HUMAN PAPILLOMAVIRUS) TEST POSITIVE: Status: RESOLVED | Noted: 2017-08-10 | Resolved: 2023-03-24

## 2023-03-24 PROBLEM — R23.3 ABNORMAL BRUISING: Status: RESOLVED | Noted: 2020-07-21 | Resolved: 2023-03-24

## 2023-03-24 NOTE — PROGRESS NOTES
Assessment and Plan:     1  Prediabetes    Discussed cutting down on excess calories/carbs and increasing walking  Check A1c before next appointment  - Hemoglobin A1C; Future    2  Hypercholesterolemia    Doing well on atorvastatin  Will continue current dose  - Lipid panel; Future  - CBC; Future  - Basic metabolic panel; Future    3  Encounter for screening mammogram for breast cancer  - Mammo screening bilateral w 3d & cad; Future    4  Bilateral thumb pain  5  Bilateral arm pain    Reviewed previous EMG  Discussed oral and topical NSAIDs prn  Check x-rays of the hands  - XR hand 3+ vw left; Future  - XR hand 3+ vw right; Future  - Ambulatory Referral to Hand Surgery; Future  - Ambulatory Referral to Hand Surgery; Future    6  Medicare annual wellness visit, subsequent    BMI Counseling: Body mass index is 32 22 kg/m²  The BMI is above normal  Nutrition recommendations include decreasing portion sizes, encouraging healthy choices of fruits and vegetables, moderation in carbohydrate intake and increasing intake of lean protein  Exercise recommendations include exercising 3-5 times per week  Rationale for BMI follow-up plan is due to patient being overweight or obese  Depression Screening and Follow-up Plan: Patient was screened for depression during today's encounter  They screened negative with a PHQ-2 score of 0  Preventive health issues were discussed with patient, and age appropriate screening tests were ordered as noted in patient's After Visit Summary  Personalized health advice and appropriate referrals for health education or preventive services given if needed, as noted in patient's After Visit Summary  History of Present Illness:     Patient presents for a Medicare Wellness Visit    Chingyelitza NgoKostas presents for follow-up and to go over labs  Labs are stable  Admits that she can be better with diet/exercise  Still getting some pain in her forearms at times   Sometimes feels more pins/needles  EMG last year was normal  Also getting pain in her thumbs  Patient Care Team:  Valentino Mcguire DO as PCP - General (Internal Medicine)  Amy Retana MD as Endoscopist     Review of Systems:     Review of Systems   Constitutional: Negative  Respiratory: Negative  Cardiovascular: Negative  Gastrointestinal: Negative  Musculoskeletal: Positive for arthralgias  Problem List:     Patient Active Problem List   Diagnosis   • Hypercholesterolemia   • Prediabetes   • MARGARITA (obstructive sleep apnea)      Past Medical and Surgical History:     Past Medical History:   Diagnosis Date   • Cervical high risk HPV (human papillomavirus) test positive 8/10/2017   • Ovarian cyst    • Postmenopausal atrophic vaginitis    • Sleep apnea, obstructive    • Uterine prolapse      Past Surgical History:   Procedure Laterality Date   • BREAST BIOPSY Left     benign- does not recall when   • COLONOSCOPY  2014   • HYSTERECTOMY  1986   • PARTIAL HYSTERECTOMY      supracervical   • PA COLONOSCOPY FLX DX W/COLLJ SPEC WHEN PFRMD N/A 2/20/2019    Procedure: COLONOSCOPY;  Surgeon: Amy Retana MD;  Location: [facility];   Service: Gastroenterology      Family History:     Family History   Problem Relation Age of Onset   • No Known Problems Mother    • No Known Problems Father    • Breast cancer Sister 48   • Diabetes Family    • Hypertension Family    • Diabetes Other    • Hypertension Other    • No Known Problems Maternal Grandmother    • No Known Problems Paternal Grandmother    • No Known Problems Sister    • No Known Problems Maternal Aunt    • No Known Problems Maternal Aunt    • No Known Problems Maternal Aunt    • No Known Problems Paternal Aunt    • No Known Problems Paternal Aunt    • Colon cancer Neg Hx    • Ovarian cancer Neg Hx    • Uterine cancer Neg Hx    • Cervical cancer Neg Hx       Social History:     Social History     Socioeconomic History   • Marital status: /Civil Union     Spouse name: None   • Number of children: None   • Years of education: None   • Highest education level: None   Occupational History   • None   Tobacco Use   • Smoking status: Never   • Smokeless tobacco: Never   Vaping Use   • Vaping Use: Never used   Substance and Sexual Activity   • Alcohol use: No   • Drug use: No   • Sexual activity: Yes     Partners: Male     Birth control/protection: Surgical   Other Topics Concern   • None   Social History Narrative   • None     Social Determinants of Health     Financial Resource Strain: Low Risk    • Difficulty of Paying Living Expenses: Not hard at all   Food Insecurity: Not on file   Transportation Needs: No Transportation Needs   • Lack of Transportation (Medical): No   • Lack of Transportation (Non-Medical): No   Physical Activity: Not on file   Stress: Not on file   Social Connections: Not on file   Intimate Partner Violence: Not on file   Housing Stability: Not on file      Medications and Allergies:     Current Outpatient Medications   Medication Sig Dispense Refill   • atorvastatin (LIPITOR) 20 mg tablet TAKE 1 TABLET BY MOUTH EVERY DAY WITH DINNER 90 tablet 1   • cholecalciferol (VITAMIN D3) 1,000 units tablet Take 1,000 Units by mouth daily     • [employer] OMEGA-3 KRILL  MG CAPS Take by mouth daily      • Multiple Vitamin (MULTIVITAMIN) capsule Take 1 capsule by mouth daily       No current facility-administered medications for this visit       No Known Allergies   Immunizations:     Immunization History   Administered Date(s) Administered   • COVID-19 PFIZER VACCINE 0 3 ML IM 04/13/2021, 05/05/2021, 11/19/2021   • Pneumococcal Conjugate Vaccine 20-valent (Pcv20), Polysace 09/19/2022   • Pneumococcal Polysaccharide PPV23 03/26/2021      Health Maintenance:         Topic Date Due   • Breast Cancer Screening: Mammogram  06/20/2023   • Colorectal Cancer Screening  02/20/2024   • DXA SCAN  05/20/2031   • Hepatitis C Screening  Completed         Topic Date Due   • COVID-19 Vaccine (4 - Booster for Pfizer series) 01/14/2022   • Influenza Vaccine (1) Never done      Medicare Screening Tests and Risk Assessments:     Ira Brownlee is here for her Subsequent Wellness visit  Last Medicare Wellness visit information reviewed, patient interviewed, no change since last AWV  Health Risk Assessment:   Patient rates overall health as good  Patient feels that their physical health rating is slightly worse  Patient is satisfied with their life  Eyesight was rated as slightly worse  Hearing was rated as slightly worse  Patient feels that their emotional and mental health rating is same  Patients states they are sometimes angry  Patient states they are sometimes unusually tired/fatigued  Pain experienced in the last 7 days has been some  Patient's pain rating has been 6/10  Patient states that she has experienced weight loss or gain in last 6 months  Depression Screening:   PHQ-2 Score: 0      Fall Risk Screening: In the past year, patient has experienced: no history of falling in past year      Urinary Incontinence Screening:   Patient has leaked urine accidently in the last six months  Home Safety:  Patient does not have trouble with stairs inside or outside of their home  Patient has working smoke alarms and has working carbon monoxide detector  Home safety hazards include: none  Nutrition:   Current diet is Regular  Medications:   Patient is currently taking over-the-counter supplements  OTC medications include: see medication list  Patient is able to manage medications  Activities of Daily Living (ADLs)/Instrumental Activities of Daily Living (IADLs):   Walk and transfer into and out of bed and chair?: Yes  Dress and groom yourself?: Yes    Bathe or shower yourself?: Yes    Feed yourself?  Yes  Do your laundry/housekeeping?: Yes  Manage your money, pay your bills and track your expenses?: Yes  Make your own meals?: Yes    Do your own shopping?: Yes    Durable Medical Equipment Suppliers  none    Previous Hospitalizations:   Any hospitalizations or ED visits within the last 12 months?: No      Advance Care Planning:   Living will: No    Durable POA for healthcare: No    Advanced directive: No    Five wishes given: No      Cognitive Screening:   Provider or family/friend/caregiver concerned regarding cognition?: No    PREVENTIVE SCREENINGS      Cardiovascular Screening:    General: Screening Not Indicated and History Lipid Disorder      Diabetes Screening:     General: Screening Current      Colorectal Cancer Screening:     General: Screening Current      Breast Cancer Screening:     General: Screening Current      Cervical Cancer Screening:    General: Screening Not Indicated      Osteoporosis Screening:    General: Screening Current      Abdominal Aortic Aneurysm (AAA) Screening:        General: Screening Not Indicated      Lung Cancer Screening:     General: Screening Not Indicated      Hepatitis C Screening:    General: Screening Current    Screening, Brief Intervention, and Referral to Treatment (SBIRT)    Screening  Typical number of drinks in a day: 0  Typical number of drinks in a week: 0  Interpretation: Low risk drinking behavior  AUDIT-C Screenin) How often did you have a drink containing alcohol in the past year? never  2) How many drinks did you have on a typical day when you were drinking in the past year? 0  3) How often did you have 6 or more drinks on one occasion in the past year? never    AUDIT-C Score: 0  Interpretation: Score 0-2 (female): Negative screen for alcohol misuse    Single Item Drug Screening:  How often have you used an illegal drug (including marijuana) or a prescription medication for non-medical reasons in the past year? never    Single Item Drug Screen Score: 0  Interpretation: Negative screen for possible drug use disorder    Brief Intervention  Alcohol & drug use screenings were reviewed  No concerns regarding substance use disorder identified  Other Counseling Topics:   Car/seat belt/driving safety, skin self-exam, sunscreen and regular weightbearing exercise and calcium and vitamin D intake  Physical Exam:     /70 (BP Location: Left arm, Patient Position: Sitting, Cuff Size: Standard)   Pulse 84   Temp 97 6 °F (36 4 °C) (Temporal)   Resp 20   Ht 5' (1 524 m)   Wt 74 8 kg (165 lb)   SpO2 98%   BMI 32 22 kg/m²     Physical Exam  Constitutional:       General: She is not in acute distress  Appearance: She is not ill-appearing  Cardiovascular:      Rate and Rhythm: Normal rate and regular rhythm  Heart sounds: No murmur heard  Pulmonary:      Effort: Pulmonary effort is normal  No respiratory distress  Breath sounds: No wheezing  Abdominal:      General: Bowel sounds are normal  There is no distension  Tenderness: There is no abdominal tenderness  Neurological:      Mental Status: She is alert          Pankaj Emily, DO

## 2023-03-24 NOTE — PATIENT INSTRUCTIONS
Medicare Preventive Visit Patient Instructions  Thank you for completing your Welcome to Medicare Visit or Medicare Annual Wellness Visit today  Your next wellness visit will be due in one year (3/24/2024)  The screening/preventive services that you may require over the next 5-10 years are detailed below  Some tests may not apply to you based off risk factors and/or age  Screening tests ordered at today's visit but not completed yet may show as past due  Also, please note that scanned in results may not display below  Preventive Screenings:  Service Recommendations Previous Testing/Comments   Colorectal Cancer Screening  * Colonoscopy    * Fecal Occult Blood Test (FOBT)/Fecal Immunochemical Test (FIT)  * Fecal DNA/Cologuard Test  * Flexible Sigmoidoscopy Age: 39-70 years old   Colonoscopy: every 10 years (may be performed more frequently if at higher risk)  OR  FOBT/FIT: every 1 year  OR  Cologuard: every 3 years  OR  Sigmoidoscopy: every 5 years  Screening may be recommended earlier than age 39 if at higher risk for colorectal cancer  Also, an individualized decision between you and your healthcare provider will decide whether screening between the ages of 74-80 would be appropriate  Colonoscopy: 02/20/2019  FOBT/FIT: Not on file  Cologuard: Not on file  Sigmoidoscopy: Not on file    Screening Current     Breast Cancer Screening Age: 36 years old  Frequency: every 1-2 years  Not required if history of left and right mastectomy Mammogram: 06/20/2022    Screening Current   Cervical Cancer Screening Between the ages of 21-29, pap smear recommended once every 3 years  Between the ages of 33-67, can perform pap smear with HPV co-testing every 5 years     Recommendations may differ for women with a history of total hysterectomy, cervical cancer, or abnormal pap smears in past  Pap Smear: 06/20/2022    Screening Not Indicated   Hepatitis C Screening Once for adults born between 1945 and 1965  More frequently in patients at high risk for Hepatitis C Hep C Antibody: 03/29/2021    Screening Current   Diabetes Screening 1-2 times per year if you're at risk for diabetes or have pre-diabetes Fasting glucose: 83 mg/dL (2/20/2023)  A1C: 5 7 % (9/9/2022)  Screening Current   Cholesterol Screening Once every 5 years if you don't have a lipid disorder  May order more often based on risk factors  Lipid panel: 02/20/2023    Screening Not Indicated  History Lipid Disorder     Other Preventive Screenings Covered by Medicare:  Abdominal Aortic Aneurysm (AAA) Screening: covered once if your at risk  You're considered to be at risk if you have a family history of AAA  Lung Cancer Screening: covers low dose CT scan once per year if you meet all of the following conditions: (1) Age 50-69; (2) No signs or symptoms of lung cancer; (3) Current smoker or have quit smoking within the last 15 years; (4) You have a tobacco smoking history of at least 20 pack years (packs per day multiplied by number of years you smoked); (5) You get a written order from a healthcare provider  Glaucoma Screening: covered annually if you're considered high risk: (1) You have diabetes OR (2) Family history of glaucoma OR (3)  aged 48 and older OR (3)  American aged 72 and older  Osteoporosis Screening: covered every 2 years if you meet one of the following conditions: (1) You're estrogen deficient and at risk for osteoporosis based off medical history and other findings; (2) Have a vertebral abnormality; (3) On glucocorticoid therapy for more than 3 months; (4) Have primary hyperparathyroidism; (5) On osteoporosis medications and need to assess response to drug therapy  Last bone density test (DXA Scan): 05/20/2021  HIV Screening: covered annually if you're between the age of 12-76  Also covered annually if you are younger than 13 and older than 72 with risk factors for HIV infection   For pregnant patients, it is covered up to 3 times per pregnancy  Immunizations:  Immunization Recommendations   Influenza Vaccine Annual influenza vaccination during flu season is recommended for all persons aged >= 6 months who do not have contraindications   Pneumococcal Vaccine   * Pneumococcal conjugate vaccine = PCV13 (Prevnar 13), PCV15 (Vaxneuvance), PCV20 (Prevnar 20)  * Pneumococcal polysaccharide vaccine = PPSV23 (Pneumovax) Adults 25-60 years old: 1-3 doses may be recommended based on certain risk factors  Adults 72 years old: 1-2 doses may be recommended based off what pneumonia vaccine you previously received   Hepatitis B Vaccine 3 dose series if at intermediate or high risk (ex: diabetes, end stage renal disease, liver disease)   Tetanus (Td) Vaccine - COST NOT COVERED BY MEDICARE PART B Following completion of primary series, a booster dose should be given every 10 years to maintain immunity against tetanus  Td may also be given as tetanus wound prophylaxis  Tdap Vaccine - COST NOT COVERED BY MEDICARE PART B Recommended at least once for all adults  For pregnant patients, recommended with each pregnancy  Shingles Vaccine (Shingrix) - COST NOT COVERED BY MEDICARE PART B  2 shot series recommended in those aged 48 and above     Health Maintenance Due:      Topic Date Due    Breast Cancer Screening: Mammogram  06/20/2023    Colorectal Cancer Screening  02/20/2024    DXA SCAN  05/20/2031    Hepatitis C Screening  Completed     Immunizations Due:      Topic Date Due    COVID-19 Vaccine (4 - Booster for Pfizer series) 01/14/2022    Influenza Vaccine (1) Never done     Advance Directives   What are advance directives? Advance directives are legal documents that state your wishes and plans for medical care  These plans are made ahead of time in case you lose your ability to make decisions for yourself  Advance directives can apply to any medical decision, such as the treatments you want, and if you want to donate organs     What are the types of advance directives? There are many types of advance directives, and each state has rules about how to use them  You may choose a combination of any of the following:  Living will: This is a written record of the treatment you want  You can also choose which treatments you do not want, which to limit, and which to stop at a certain time  This includes surgery, medicine, IV fluid, and tube feedings  Durable power of  for healthcare [facility] This is a written record that states who you want to make healthcare choices for you when you are unable to make them for yourself  This person, called a proxy, is usually a family member or a friend  You may choose more than 1 proxy  Do not resuscitate (DNR) order:  A DNR order is used in case your heart stops beating or you stop breathing  It is a request not to have certain forms of treatment, such as CPR  A DNR order may be included in other types of advance directives  Medical directive: This covers the care that you want if you are in a coma, near death, or unable to make decisions for yourself  You can list the treatments you want for each condition  Treatment may include pain medicine, surgery, blood transfusions, dialysis, IV or tube feedings, and a ventilator (breathing machine)  Values history: This document has questions about your views, beliefs, and how you feel and think about life  This information can help others choose the care that you would choose  Why are advance directives important? An advance directive helps you control your care  Although spoken wishes may be used, it is better to have your wishes written down  Spoken wishes can be misunderstood, or not followed  Treatments may be given even if you do not want them  An advance directive may make it easier for your family to make difficult choices about your care  Urinary Incontinence   Urinary incontinence (UI)  is when you lose control of your bladder   UI develops because your bladder cannot store or empty urine properly  The 3 most common types of UI are stress incontinence, urge incontinence, or both  Medicines:   May be given to help strengthen your bladder control  Report any side effects of medication to your healthcare provider  Do pelvic muscle exercises often:  Your pelvic muscles help you stop urinating  Squeeze these muscles tight for 5 seconds, then relax for 5 seconds  Gradually work up to squeezing for 10 seconds  Do 3 sets of 15 repetitions a day, or as directed  This will help strengthen your pelvic muscles and improve bladder control  Train your bladder:  Go to the bathroom at set times, such as every 2 hours, even if you do not feel the urge to go  You can also try to hold your urine when you feel the urge to go  For example, hold your urine for 5 minutes when you feel the urge to go  As that becomes easier, hold your urine for 10 minutes  Self-care:   Keep a UI record  Write down how often you leak urine and how much you leak  Make a note of what you were doing when you leaked urine  Drink liquids as directed  You may need to limit the amount of liquid you drink to help control your urine leakage  Do not drink any liquid right before you go to bed  Limit or do not have drinks that contain caffeine or alcohol  Prevent constipation  Eat a variety of high-fiber foods  Good examples are high-fiber cereals, beans, vegetables, and whole-grain breads  Walking is the best way to trigger your intestines to have a bowel movement  Exercise regularly and maintain a healthy weight  Weight loss and exercise will decrease pressure on your bladder and help you control your leakage  Use a catheter as directed  to help empty your bladder  A catheter is a tiny, plastic tube that is put into your bladder to drain your urine  Go to behavior therapy as directed  Behavior therapy may be used to help you learn to control your urge to urinate      Weight Management   Why it is important to manage your weight:  Being overweight increases your risk of health conditions such as heart disease, high blood pressure, type 2 diabetes, and certain types of cancer  It can also increase your risk for osteoarthritis, sleep apnea, and other respiratory problems  Aim for a slow, steady weight loss  Even a small amount of weight loss can lower your risk of health problems  How to lose weight safely:  A safe and healthy way to lose weight is to eat fewer calories and get regular exercise  You can lose up about 1 pound a week by decreasing the number of calories you eat by 500 calories each day  Healthy meal plan for weight management:  A healthy meal plan includes a variety of foods, contains fewer calories, and helps you stay healthy  A healthy meal plan includes the following:  Eat whole-grain foods more often  A healthy meal plan should contain fiber  Fiber is the part of grains, fruits, and vegetables that is not broken down by your body  Whole-grain foods are healthy and provide extra fiber in your diet  Some examples of whole-grain foods are whole-wheat breads and pastas, oatmeal, brown rice, and bulgur  Eat a variety of vegetables every day  Include dark, leafy greens such as spinach, kale, natalie greens, and mustard greens  Eat yellow and orange vegetables such as carrots, sweet potatoes, and winter squash  Eat a variety of fruits every day  Choose fresh or canned fruit (canned in its own juice or light syrup) instead of juice  Fruit juice has very little or no fiber  Eat low-fat dairy foods  Drink fat-free (skim) milk or 1% milk  Eat fat-free yogurt and low-fat cottage cheese  Try low-fat cheeses such as mozzarella and other reduced-fat cheeses  Choose meat and other protein foods that are low in fat  Choose beans or other legumes such as split peas or lentils  Choose fish, skinless poultry (chicken or turkey), or lean cuts of red meat (beef or pork)   Before you cook meat or poultry, cut off any visible fat  Use less fat and oil  Try baking foods instead of frying them  Add less fat, such as margarine, sour cream, regular salad dressing and mayonnaise to foods  Eat fewer high-fat foods  Some examples of high-fat foods include french fries, doughnuts, ice cream, and cakes  Eat fewer sweets  Limit foods and drinks that are high in sugar  This includes candy, cookies, regular soda, and sweetened drinks  Exercise:  Exercise at least 30 minutes per day on most days of the week  Some examples of exercise include walking, biking, dancing, and swimming  You can also fit in more physical activity by taking the stairs instead of the elevator or parking farther away from stores  Ask your healthcare provider about the best exercise plan for you  © Copyright [employer] 2018 Information is for End User's use only and may not be sold, redistributed or otherwise used for commercial purposes   All illustrations and images included in CareNotes® are the copyrighted property of [employer]  or [address] [place] [address]

## 2023-03-24 NOTE — TELEPHONE ENCOUNTER
Patient is being referred to a orthopedics  Please schedule accordingly      See 178   [phone]

## 2023-06-15 ENCOUNTER — OFFICE VISIT (OUTPATIENT)
Dept: OBGYN CLINIC | Facility: CLINIC | Age: 68
End: 2023-06-15
Payer: COMMERCIAL

## 2023-06-15 ENCOUNTER — APPOINTMENT (OUTPATIENT)
Dept: RADIOLOGY | Facility: AMBULARY SURGERY CENTER | Age: 68
End: 2023-06-15
Attending: SURGERY
Payer: COMMERCIAL

## 2023-06-15 VITALS — HEIGHT: 61 IN | WEIGHT: 165 LBS | BODY MASS INDEX: 31.15 KG/M2

## 2023-06-15 DIAGNOSIS — M79.644 BILATERAL THUMB PAIN: ICD-10-CM

## 2023-06-15 DIAGNOSIS — M79.645 BILATERAL THUMB PAIN: ICD-10-CM

## 2023-06-15 DIAGNOSIS — G56.23 CUBITAL TUNNEL SYNDROME OF BOTH UPPER EXTREMITIES: ICD-10-CM

## 2023-06-15 DIAGNOSIS — G56.01 CARPAL TUNNEL SYNDROME OF RIGHT WRIST: Primary | ICD-10-CM

## 2023-06-15 DIAGNOSIS — M77.8 RIGHT WRIST TENDINITIS: ICD-10-CM

## 2023-06-15 DIAGNOSIS — M79.602 BILATERAL ARM PAIN: ICD-10-CM

## 2023-06-15 DIAGNOSIS — M79.601 BILATERAL ARM PAIN: ICD-10-CM

## 2023-06-15 DIAGNOSIS — M18.0 PRIMARY OSTEOARTHRITIS OF BOTH FIRST CARPOMETACARPAL JOINTS: ICD-10-CM

## 2023-06-15 PROCEDURE — 20600 DRAIN/INJ JOINT/BURSA W/O US: CPT | Performed by: SURGERY

## 2023-06-15 PROCEDURE — 99204 OFFICE O/P NEW MOD 45 MIN: CPT | Performed by: SURGERY

## 2023-06-15 PROCEDURE — 73130 X-RAY EXAM OF HAND: CPT

## 2023-06-15 RX ORDER — TRIAMCINOLONE ACETONIDE 40 MG/ML
20 INJECTION, SUSPENSION INTRA-ARTICULAR; INTRAMUSCULAR
Status: COMPLETED | OUTPATIENT
Start: 2023-06-15 | End: 2023-06-15

## 2023-06-15 RX ORDER — LIDOCAINE HYDROCHLORIDE 10 MG/ML
1 INJECTION, SOLUTION INFILTRATION; PERINEURAL
Status: COMPLETED | OUTPATIENT
Start: 2023-06-15 | End: 2023-06-15

## 2023-06-15 RX ADMIN — LIDOCAINE HYDROCHLORIDE 1 ML: 10 INJECTION, SOLUTION INFILTRATION; PERINEURAL at 13:00

## 2023-06-15 RX ADMIN — TRIAMCINOLONE ACETONIDE 20 MG: 40 INJECTION, SUSPENSION INTRA-ARTICULAR; INTRAMUSCULAR at 13:00

## 2023-06-15 NOTE — PROGRESS NOTES
Ivonne NAVARRO  Attending, Orthopaedic Surgery  Hand, Wrist, and Elbow Surgery  [facility]      ORTHOPAEDIC HAND, WRIST, AND ELBOW OFFICE  VISIT       ASSESSMENT/PLAN:      76year old female here for her bilateral thumb CMC joint OA, FCR tendionitis and bilateral hand numbness and tingling  Bilateral FCR tendonitis that will respond well to OT  Bilateral thumb CMC joint pain due to osteoarthritis  Conservative treatments for the thumbs recommended heat more than ice; in the AM and PM, topical Voltaren gel 1%, wear a thumb comfort cool during the day for activities  For the numbness and tingling worse on the right, then recommend a cock up wrist brace for the right side  US of the bilateral hands and wrists are recommended to evaluate for median and ulnar nerve compression  The patient verbalized understanding of exam findings and treatment plan  We engaged in the shared decision-making process and treatment options were discussed at length with the patient  Surgical and conservative management discussed today along with risks and benefits  Diagnoses and all orders for this visit:    Carpal tunnel syndrome of right wrist  -     Cock Up Wrist Splint  -     US MSK limited; Future    Bilateral thumb pain  -     Ambulatory Referral to Hand Surgery  -     XR hand 3+ vw right; Future  -     XR hand 3+ vw left; Future    Bilateral arm pain  -     Ambulatory Referral to Hand Surgery    Primary osteoarthritis of both first carpometacarpal joints  -     Thumb Cude comf/Cool    Cubital tunnel syndrome of both upper extremities  -     US MSK limited; Future    Right wrist tendinitis  -     Ambulatory Referral to PT/OT Hand Therapy; Future    Other orders  -     Small joint arthrocentesis  -     Small joint arthrocentesis        Follow Up:  No follow-ups on file      To Do Next Visit:  Re-evaluation of current issue      General Discussions:  [facility] Arthritis: The anatomy and physiology of carpometacarpal joint arthritis was discussed with the patient today in the office  Deterioration of the articular cartilage eventually leads to hypermobility at the thumb [facility], resulting in joint subluxation, osteophyte formation, cystic changes within the trapezium and base of the first metacarpal, as well as subchondral sclerosis  Eventually, pain, limited mobility, and compensatory hyperextension at the metacarpophalangeal joint may develop  While normal activity and usage of the thumb joint may provide a painful experience to the patient, this typically does not result in damage to the thumb or hand  Treatment options include resting thumb spica splints to decreased joint edema, pain, and inflammation  Therapy exercises to strengthen the thenar musculature may relieve pain, but do not alter the overall continued development of osteoarthritis  Oral medications, topical medications, corticosteroid injections may decrease pain and increase overall function  Eventually, approximately 5% of patients may require surgical intervention  Carpal Tunnel Syndrome: The anatomy and physiology of carpal tunnel syndrome was discussed with the patient today  Increase pressure localized under the transverse carpal ligament can cause pain, numbness, tingling, or dysesthesias within the median nerve distribution as well as feelings of fatigue, clumsiness, or awkwardness  These symptoms typically occur at night and worse in the morning upon waking  Eventually, untreated carpal tunnel syndrome can result in weakness and permanent loss of muscle within the thenar compartment of the hand  Treatment options were discussed with the patient    Conservative treatment includes nocturnal resting splints to keep the nerve in a neutral position, ergonomic changes within the work or home environment, activity modification, and tendon gliding exercises  Vitamin B6 one tablet daily over the counter may helpful to reduce symptoms  Steroid injections within the carpal canal can help a majority of patients, however this is often self-limited in a majority of patients  Surgical intervention to divide the transverse carpal ligament typically results in a long-lasting relief of the patient's complaints, with the recurrence rate of less than 1%  Cubital Tunnel Syndrome: The anatomy and physiology of cubital tunnel syndrome were discussed with the patient today in the office  Typically, increased elbow flexion activities decrease blood flow within the intraneural spaces, resulting in a feeling of numbness, tingling, weakness, or clumsiness within the hand and fingers  Occasionally, anatomic structures such as medial elbow osteophytes, the medial head of the triceps, were subluxing ulnar nerve may result in increased pressure or aggravation at the cubital tunnel  Typical signs and symptoms usually include numbness and tingling within the ring and small finger, weakness with , and weakness with pinch  Conservative treatment and includes nocturnal bracing to keep the elbow in a semi-extended position, activity modification, therapy, and avoiding excessive elbow flexion activities  Vitamin B6 one tablet daily over the counter may helpful to reduce symptoms  A majority of patients typically respond to conservative treatment over a period of approximately 3-6 months  EMG/NCV testing of the ulnar nerve at the elbow is not as reliable as carpal tunnel syndrome    Surgical intervention in the form of in situ release of the ulnar nerve at the elbow or ulnar nerve transposition may be required in up to 20% of patients  Osteoarthritis:  The anatomy and physiology of osteoarthritis was discussed with the patient today in the office  Deterioration of the articular cartilage eventually leads to altered mobility at the joint, resulting in joint subluxation, osteophyte formation, cystic changes, as well as subchondral sclerosis  Eventually, pain, limited mobility, and compensatory hypermobility at surrounding joints may develop  While normal activity and usage of the joint may provide a painful experience to the patient, this typically does not result in damage to the limb  Treatment options include splints to decreased joint edema, pain, and inflammation  Therapy exercises to strengthen the surrounding musculature may relieve pain, but do not alter the overall continued development of osteoarthritis  Oral medications, topical medications, corticosteroid injections may decrease pain and increase overall function  Eventually, some patients may require surgical intervention  ____________________________________________________________________________________________________________________________________________      CHIEF COMPLAINT:  Chief Complaint   Patient presents with   • Right Hand - Pain   • Left Hand - Pain       SUBJECTIVE:  Alycia Garcia is a 76y o  year old RHD female who presents today for a consultation for her bilateral thumb pain, bilateral hand numbness and tingling with cramping in the right hand  She reports that her symptoms are not triggered by any specific movements or positions   She has soreness in the right volar wrist    She gets a dull aching pain       Pain/symptom timing:  Worse during the day when active  Pain/symptom context:  Worse with activites and work  Pain/symptom modifying factors:  Rest makes better, activities make worse  Pain/symptom associated signs/symptoms: none    Prior treatment   · NSAIDsNo   · Injections No   · Bracing/Orthotics No Physical Therapy No     I have personally reviewed all the relevant PMH, PSH, SH, FH, Medications and allergies      PAST MEDICAL HISTORY:  Past Medical History:   Diagnosis Date   • Cervical high risk HPV (human papillomavirus) test positive 8/10/2017   • Ovarian cyst    • Postmenopausal atrophic vaginitis    • Sleep apnea, obstructive    • Uterine prolapse        PAST SURGICAL HISTORY:  Past Surgical History:   Procedure Laterality Date   • BREAST BIOPSY Left     benign- does not recall when   • COLONOSCOPY  2014   • HYSTERECTOMY  1986   • PARTIAL HYSTERECTOMY      supracervical   • AL COLONOSCOPY FLX DX W/COLLJ SPEC WHEN PFRMD N/A 2/20/2019    Procedure: COLONOSCOPY;  Surgeon: Brandan Armendariz MD;  Location: [facility];   Service: Gastroenterology       FAMILY HISTORY:  Family History   Problem Relation Age of Onset   • No Known Problems Mother    • No Known Problems Father    • Breast cancer Sister 48   • Diabetes Family    • Hypertension Family    • Diabetes Other    • Hypertension Other    • No Known Problems Maternal Grandmother    • No Known Problems Paternal Grandmother    • No Known Problems Sister    • No Known Problems Maternal Aunt    • No Known Problems Maternal Aunt    • No Known Problems Maternal Aunt    • No Known Problems Paternal Aunt    • No Known Problems Paternal Aunt    • Colon cancer Neg Hx    • Ovarian cancer Neg Hx    • Uterine cancer Neg Hx    • Cervical cancer Neg Hx        SOCIAL HISTORY:  Social History     Tobacco Use   • Smoking status: Never   • Smokeless tobacco: Never   Vaping Use   • Vaping Use: Never used   Substance Use Topics   • Alcohol use: No   • Drug use: No       MEDICATIONS:    Current Outpatient Medications:   •  atorvastatin (LIPITOR) 20 mg tablet, TAKE 1 TABLET BY MOUTH EVERY DAY WITH DINNER, Disp: 90 tablet, Rfl: 1  •  cholecalciferol (VITAMIN D3) 1,000 units tablet, Take 1,000 Units by mouth daily, Disp: , Rfl:   •  [employer] OMEGA-3 KRILL  MG CAPS, Take by mouth daily , Disp: , Rfl:   •  Multiple Vitamin (MULTIVITAMIN) capsule, Take 1 capsule by mouth daily, Disp: , Rfl:     ALLERGIES:  No Known Allergies        REVIEW OF SYSTEMS:  Review of Systems    VITALS:  Vitals:       LABS:  HgA1c:   Lab Results   Component Value Date    HGBA1C 5 7 (H) 09/09/2022     BMP:   Lab Results   Component Value Date    BUN 16 02/20/2023    CALCIUM 9 0 02/20/2023     (H) 02/20/2023    CO2 24 02/20/2023    CREATININE 0 92 02/20/2023    K 4 3 02/20/2023       _____________________________________________________  PHYSICAL EXAMINATION:  General: well developed and well nourished, alert, oriented times 3 and appears comfortable  Psychiatric: Normal  HEENT: Normocephalic, Atraumatic Trachea Midline, No torticollis  Pulmonary: No audible wheezing or respiratory distress   Abdomen/GI: Non tender, non distended   Cardiovascular: No pitting edema, 2+ radial pulse   Skin: No masses, erythema, lacerations, fluctation, ulcerations  Neurovascular: Sensation Intact to the Radial Nerve, Decreased Sensation to  the Median Nerve, Decreased Sensation to  the Ulnar Nerve, Motor Intact to the Median, Ulnar, Radial Nerve and Pulses Intact  Musculoskeletal: Normal, except as noted in detailed exam and in HPI        MUSCULOSKELETAL EXAMINATION:    Right wrist:  + Tenderness at the FCR  right [facility] Exam:  No adduction contracture  No hyperextension deformity of MCP joint  Positive localized tenderness over radial and dorsal aspect of thumb (CMC joint)  Grind test is Positive for pain and Negative for crepitus  Metacarpal load shift test Positive  No triggering or tenderness over the A1 pulley  Negative pain with Finkelstein’s maneuver     left CMC Exam:  No adduction contracture  No hyperextension deformity of MCP joint  Positive localized tenderness over radial and dorsal aspect of thumb (CMC joint)  Grind test is Positive for pain and Negative for crepitus  Metacarpal load shift test Positive  No triggering or "tenderness over the A1 pulley  Mildly positive pain with Finkelstein’s maneuver     Bilateral Carpal Tunnel Exam:    Negative thenar atrophy  Negative phalen's test  Positive carpal tunnel compression on the RIGHT  Negative tinels over median nerve at the wrist   Opposition strength 5/5  Abduction strength 5/5  Bilateral Ulnar Nerve Exam:    Negative intrinsic atrophy  Negative  deformity at the elbow  Full range of motion with flexion and extension of the elbow  Positive ulnar nerve compression test at the elbow- mostly LEFT  Negative tinels over the ulnar nerve at the elbow  Negative cross finger test in the index and long fingers  Intrinsic strength 5/5        ___________________________________________________  STUDIES REVIEWED:  I have personally reviewed AP lateral and oblique radiographs of right hand 3 views   which demonstrate mild to moderate cmc arthritis   Left hand 3 views: similar, mild to moderate cmc arthritis         PROCEDURES PERFORMED:  Small joint arthrocentesis: L thumb CMC  [place] Protocol:  Consent: Verbal consent obtained  Risks and benefits: risks, benefits and alternatives were discussed  Consent given by: patient  Time out: Immediately prior to procedure a \"time out\" was called to verify the correct patient, procedure, equipment, support staff and site/side marked as required    Timeout called at: 6/15/2023 2:00 PM   Patient understanding: patient states understanding of the procedure being performed  Site marked: the operative site was marked  Patient identity confirmed: verbally with patient    Supporting Documentation  Indications: pain   Procedure Details  Location: thumb - L thumb CMC  Preparation: Patient was prepped and draped in the usual sterile fashion  Needle size: 25 G  Ultrasound guidance: no  Approach: volar  Medications administered: 1 mL lidocaine 1 %; 20 mg triamcinolone acetonide 40 mg/mL    Patient tolerance: patient tolerated the procedure well with no " "immediate complications  Dressing:  Sterile dressing applied    Small joint arthrocentesis: R thumb CMC  [place] Protocol:  Consent: Verbal consent obtained  Risks and benefits: risks, benefits and alternatives were discussed  Consent given by: patient  Time out: Immediately prior to procedure a \"time out\" was called to verify the correct patient, procedure, equipment, support staff and site/side marked as required    Timeout called at: 6/15/2023 2:01 PM   Patient understanding: patient states understanding of the procedure being performed  Site marked: the operative site was marked  Patient identity confirmed: verbally with patient    Supporting Documentation  Indications: pain   Procedure Details  Location: thumb - R thumb CMC  Preparation: Patient was prepped and draped in the usual sterile fashion  Needle size: 25 G  Ultrasound guidance: no  Approach: volar  Medications administered: 1 mL lidocaine 1 %; 20 mg triamcinolone acetonide 40 mg/mL    Patient tolerance: patient tolerated the procedure well with no immediate complications  Dressing:  Sterile dressing applied            _____________________________________________________      Scribe Attestation    I,:  Erica Schulte am acting as a scribe while in the presence of the attending physician :       I,:  Danilo Bhatt MD personally performed the services described in this documentation    as scribed in my presence :                 "

## 2023-06-18 DIAGNOSIS — E78.00 HYPERCHOLESTEROLEMIA: Chronic | ICD-10-CM

## 2023-06-18 RX ORDER — ATORVASTATIN CALCIUM 20 MG/1
TABLET, FILM COATED ORAL
Qty: 90 TABLET | Refills: 1 | Status: SHIPPED | OUTPATIENT
Start: 2023-06-18

## 2023-06-23 ENCOUNTER — HOSPITAL ENCOUNTER (OUTPATIENT)
Age: 68
Discharge: HOME | End: 2023-06-23
Payer: COMMERCIAL

## 2023-06-23 VITALS — WEIGHT: 165 LBS | BODY MASS INDEX: 31.15 KG/M2 | HEIGHT: 61 IN

## 2023-06-23 DIAGNOSIS — Z12.31 ENCOUNTER FOR SCREENING MAMMOGRAM FOR BREAST CANCER: ICD-10-CM

## 2023-06-23 PROCEDURE — 77063 BREAST TOMOSYNTHESIS BI: CPT

## 2023-06-23 PROCEDURE — 77067 SCR MAMMO BI INCL CAD: CPT

## 2023-06-23 NOTE — PROGRESS NOTES
Patient is here today for a gynecological annual exam    No questions or concerns today  LMP: Partial Hysterectomy       Last pap: 2022     Hx of abnormal paps?   Last Mammo: 23  Last Colonoscopy: 2019  Last Dexa: 2021

## 2023-06-26 ENCOUNTER — TELEPHONE (OUTPATIENT)
Age: 68
End: 2023-06-26

## 2023-06-26 ENCOUNTER — ANNUAL EXAM (OUTPATIENT)
Age: 68
End: 2023-06-26
Payer: COMMERCIAL

## 2023-06-26 VITALS
WEIGHT: 154 LBS | DIASTOLIC BLOOD PRESSURE: 68 MMHG | SYSTOLIC BLOOD PRESSURE: 118 MMHG | BODY MASS INDEX: 29.07 KG/M2 | HEIGHT: 61 IN

## 2023-06-26 DIAGNOSIS — Z78.0 ASYMPTOMATIC POSTMENOPAUSAL STATUS: ICD-10-CM

## 2023-06-26 DIAGNOSIS — Z01.419 ENCOUNTER FOR ANNUAL ROUTINE GYNECOLOGICAL EXAMINATION: Primary | ICD-10-CM

## 2023-06-26 PROCEDURE — G0101 CA SCREEN;PELVIC/BREAST EXAM: HCPCS

## 2023-06-26 NOTE — TELEPHONE ENCOUNTER
----- Message from Luba Galloway DO sent at 6/26/2023 10:37 AM EDT -----  Let patient know mammogram was normal

## 2023-06-26 NOTE — PATIENT INSTRUCTIONS
Breast Self Exam for Women   AMBULATORY CARE:   A breast self-exam (BSE)  is a way to check your breasts for lumps and other changes  Regular BSEs can help you know how your breasts normally look and feel  Most breast lumps or changes are not cancer, but you should always have them checked by a healthcare provider  Why you should do a BSE:  Breast cancer is the most common type of cancer in women  Even if you have mammograms, you may still want to do a BSE regularly  If you know how your breasts normally feel and look, it may help you know when to contact your healthcare provider  Mammograms can miss some cancers  You may find a lump during a BSE that did not show up on a mammogram   When you should do a BSE:  If you have periods, you may want to do your BSE 1 week after your period ends  This is the time when your breasts may be the least swollen, lumpy, or tender  You can do regular BSEs even if you are breastfeeding or have breast implants  Call your doctor if:   You find any lumps or changes in your breasts  You have breast pain or fluid coming from your nipples  You have questions or concerns about your condition or care  How to do a BSE:       Look at your breasts in a mirror  Look at the size and shape of each breast and nipple  Check for swelling, lumps, dimpling, scaly skin, or other skin changes  Look for nipple changes, such as a nipple that is painful or beginning to pull inward  Gently squeeze both nipples and check to see if fluid (that is not breast milk) comes out of them  If you find any of these or other breast changes, contact your healthcare provider  Check your breasts while you sit or  the following 3 positions:    Norris your arms down at your sides  Raise your hands and join them behind your head  Put firm pressure with your hands on your hips  Bend slightly forward while you look at your breasts in the mirror  Lie down and feel your breasts    When you lie down, your breast tissue spreads out evenly over your chest  This makes it easier for you to feel for lumps and anything that may not be normal for your breasts  Do a BSE on one breast at a time  Place a small pillow or towel under your left shoulder  Put your left arm behind your head  Use the 3 middle fingers of your right hand  Use your fingertip pads, on the top of your fingers  Your fingertip pad is the most sensitive part of your finger  Use small circles to feel your breast tissue  Use your fingertip pads to make dime-sized, overlapping circles on your breast and armpits  Use light, medium, and firm pressure  First, press lightly  Second, press with medium pressure to feel a little deeper into the breast  Last, use firm pressure to feel deep within your breast     Examine your entire breast area  Examine the breast area from above the breast to below the breast where you feel only ribs  Make small circles with your fingertips, starting in the middle of your armpit  Make circles going up and down the breast area  Continue toward your breast and all the way across it  Examine the area from your armpit all the way over to the middle of your chest (breastbone)  Stop at the middle of your chest     Move the pillow or towel to your right shoulder, and put your right arm behind your head  Use the 3 fingertip pads of your left hand, and repeat the above steps to do a BSE on your right breast   What else you can do to check for breast problems or cancer:  Talk to your healthcare provider about mammograms  A mammogram is an x-ray of your breasts to screen for breast cancer or other problems  Your provider can tell you the benefits and risks of mammograms  The first mammogram is usually at age 39 or 48  Your provider may recommend you start at 36 or younger if your risk for breast cancer is high  Mammograms usually continue every 1 to 2 years until age 76         Follow up with your doctor as directed:  Write down your questions so you remember to ask them during your visits  © Copyright [employer] 2022 Information is for End User's use only and may not be sold, redistributed or otherwise used for commercial purposes  The above information is an  only  It is not intended as medical advice for individual conditions or treatments  Talk to your doctor, nurse or pharmacist before following any medical regimen to see if it is safe and effective for you

## 2023-06-26 NOTE — ASSESSMENT & PLAN NOTE
ASCCP guidelines reviewed  Pap UTD  SBE encouraged  CBE annually  Mammogram UTD  Daily exercise and healthy diet with adequate calcium and vitamin D encouraged  Advised to call with any issues, all concerns & questions addressed     See provided information in your after visit summary

## 2023-06-26 NOTE — PROGRESS NOTES
Governor Bates  1955    Assessment/Plan: Yearly exam     Encounter for annual routine gynecological examination  ASCCP guidelines reviewed  Pap UTD  SBE encouraged  CBE annually  Mammogram UTD  Daily exercise and healthy diet with adequate calcium and vitamin D encouraged  Advised to call with any issues, all concerns & questions addressed  See provided information in your after visit summary       Diagnoses and all orders for this visit:    Encounter for annual routine gynecological examination    Asymptomatic postmenopausal status  -     DXA bone density spine hip and pelvis; Future      F/U annually or Biannual if Medicare      Health Maintenance:    Last PAP: 2022  Neg/neg h/o HPV+ , , and   Colposcopy 2018 nml  Next PAP Due: 2025    Last Mammogram: 2023  Last Colonoscopy: 2019  - 10 year f/u     DXA Scan: 2021  normal    Subjective    CC: Yearly Exam     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      S/p partial hysterectomy for benign condition    Denies PMB  Sexual activity: She is not sexually active  STD testing:  She does not want STD testing today  non smoker, non drinker    Doing well since last annual  Denies any problems or concerns today  She denies breast concerns, vaginal issues, pelvic pain, dyspareunia, urinary symptoms, symptoms of pelvic organ prolapse, urinary, or fecal incontinence today       Family hx of breast cancer: Yes Sister (48)  Family hx of ovarian cancer: No  Family hx of colon cancer: No     Past Medical History:   Diagnosis Date   • Cervical high risk HPV (human papillomavirus) test positive 8/10/2017   • Ovarian cyst    • Postmenopausal atrophic vaginitis    • Sleep apnea, obstructive    • Uterine prolapse      Past Surgical History:   Procedure Laterality Date   • BREAST BIOPSY Left     benign- does not recall when   • COLONOSCOPY     • HYSTERECTOMY     • PARTIAL HYSTERECTOMY supracervical   • FL COLONOSCOPY FLX DX W/COLLJ SPEC WHEN PFRMD N/A 2019    Procedure: COLONOSCOPY;  Surgeon: Jose Burdick MD;  Location: [facility];   Service: Gastroenterology      Family History   Problem Relation Age of Onset   • No Known Problems Mother    • No Known Problems Father    • Breast cancer Sister 48   • No Known Problems Sister    • No Known Problems Maternal Grandmother    • No Known Problems Paternal Grandmother    • No Known Problems Maternal Aunt    • No Known Problems Maternal Aunt    • No Known Problems Maternal Aunt    • No Known Problems Paternal Aunt    • No Known Problems Paternal Aunt    • Diabetes Other    • Hypertension Other    • Diabetes Family    • Hypertension Family    • Colon cancer Neg Hx    • Ovarian cancer Neg Hx    • Uterine cancer Neg Hx    • Cervical cancer Neg Hx      Social History     Tobacco Use   • Smoking status: Never   • Smokeless tobacco: Never   Vaping Use   • Vaping Use: Never used   Substance Use Topics   • Alcohol use: No   • Drug use: No       Current Outpatient Medications:   •  atorvastatin (LIPITOR) 20 mg tablet, TAKE 1 TABLET BY MOUTH EVERY DAY WITH DINNER, Disp: 90 tablet, Rfl: 1  •  cholecalciferol (VITAMIN D3) 1,000 units tablet, Take 1,000 Units by mouth daily, Disp: , Rfl:   •  [facility] OMEGA-3 KRILL  MG CAPS, Take by mouth daily  (Patient not taking: Reported on 2023), Disp: , Rfl:   •  Multiple Vitamin (MULTIVITAMIN) capsule, Take 1 capsule by mouth daily (Patient not taking: Reported on 2023), Disp: , Rfl:   Patient Active Problem List    Diagnosis Date Noted   • Encounter for annual routine gynecological examination 2023   • Carpal tunnel syndrome of right wrist 06/15/2023   • Primary osteoarthritis of both first carpometacarpal joints 06/15/2023   • MARGARITA (obstructive sleep apnea)    • Hypercholesterolemia 02/15/2018   • Prediabetes 02/15/2018       No Known Allergies    OB History    Para Term  AB Living "  1 1 1     1   SAB IAB Ectopic Multiple Live Births           1      # Outcome Date GA Lbr Piero/2nd Weight Sex Delivery Anes PTL Lv   1 Term                Vitals:    06/26/23 0736   BP: 118/68   BP Location: Left arm   Patient Position: Sitting   Cuff Size: Standard   Weight: 69 9 kg (154 lb)   Height: 5' 1\" (1 549 m)     Body mass index is 29 1 kg/m²  Review of Systems   Constitutional: Negative for chills, fatigue, fever and unexpected weight change  Gastrointestinal: Negative for abdominal pain, constipation, diarrhea, nausea and vomiting  Genitourinary: Negative for difficulty urinating, dysuria, frequency, pelvic pain, urgency, vaginal bleeding, vaginal discharge and vaginal pain  Musculoskeletal: Negative for back pain and myalgias  Skin: Negative for pallor and rash  Neurological: Negative for dizziness, light-headedness and headaches  Hematological: Negative for adenopathy  Psychiatric/Behavioral: Negative for dysphoric mood  Physical Exam  Constitutional:       General: She is not in acute distress  Appearance: Normal appearance  She is not ill-appearing  Genitourinary:      Urethral meatus normal       No lesions in the vagina  Right Labia: No rash, tenderness, lesions or skin changes  Left Labia: No tenderness, lesions, skin changes or rash  No inguinal adenopathy present in the right or left side  No vaginal discharge, erythema, tenderness, bleeding or ulceration  No vaginal prolapse present  Mild vaginal atrophy present  Right Adnexa: not palpable  Left Adnexa: not palpable  No cervical motion tenderness, discharge, friability, lesion, polyp or nabothian cyst       Uterus is absent  No urethral prolapse, tenderness or discharge present  Bladder urgency on palpation not present  Pelvic exam was performed with patient in the lithotomy position     Breasts:     Right: No swelling, inverted nipple, mass, nipple " discharge, skin change or tenderness  Left: No swelling, inverted nipple, mass, nipple discharge, skin change or tenderness  HENT:      Head: Normocephalic and atraumatic  Eyes:      Conjunctiva/sclera: Conjunctivae normal    Pulmonary:      Effort: Pulmonary effort is normal    Abdominal:      General: There is no distension  Palpations: Abdomen is soft  Tenderness: There is no abdominal tenderness  Musculoskeletal:         General: Normal range of motion  Cervical back: Neck supple  Lymphadenopathy:      Upper Body:      Right upper body: No supraclavicular or axillary adenopathy  Left upper body: No supraclavicular or axillary adenopathy  Lower Body: No right inguinal adenopathy  No left inguinal adenopathy  Neurological:      Mental Status: She is alert and oriented to person, place, and time  Skin:     General: Skin is warm and dry  Psychiatric:         Mood and Affect: Mood normal          Behavior: Behavior normal    Vitals and nursing note reviewed

## 2023-08-08 ENCOUNTER — HOSPITAL ENCOUNTER (OUTPATIENT)
Dept: ULTRASOUND IMAGING | Facility: HOSPITAL | Age: 68
Discharge: HOME | End: 2023-08-08
Attending: SURGERY
Payer: COMMERCIAL

## 2023-08-08 DIAGNOSIS — G56.23 CUBITAL TUNNEL SYNDROME OF BOTH UPPER EXTREMITIES: ICD-10-CM

## 2023-08-08 DIAGNOSIS — G56.01 CARPAL TUNNEL SYNDROME OF RIGHT WRIST: ICD-10-CM

## 2023-08-08 PROCEDURE — 76882 US LMTD JT/FCL EVL NVASC XTR: CPT

## 2023-08-14 ENCOUNTER — APPOINTMENT (OUTPATIENT)
Age: 68
End: 2023-08-14
Payer: COMMERCIAL

## 2023-08-14 ENCOUNTER — TELEPHONE (OUTPATIENT)
Age: 68
End: 2023-08-14

## 2023-08-14 DIAGNOSIS — E78.00 HYPERCHOLESTEROLEMIA: Chronic | ICD-10-CM

## 2023-08-14 DIAGNOSIS — R73.03 PREDIABETES: Chronic | ICD-10-CM

## 2023-08-14 DIAGNOSIS — R05.1 ACUTE COUGH: Primary | ICD-10-CM

## 2023-08-14 LAB
ERYTHROCYTE [DISTWIDTH] IN BLOOD BY AUTOMATED COUNT: 13.1 % (ref 11.6–15.1)
EST. AVERAGE GLUCOSE BLD GHB EST-MCNC: 131 MG/DL
HBA1C MFR BLD: 6.2 %
HCT VFR BLD AUTO: 40.3 % (ref 34.8–46.1)
HGB BLD-MCNC: 13.2 G/DL (ref 11.5–15.4)
MCH RBC QN AUTO: 30.8 PG (ref 26.8–34.3)
MCHC RBC AUTO-ENTMCNC: 32.8 G/DL (ref 31.4–37.4)
MCV RBC AUTO: 94 FL (ref 82–98)
PLATELET # BLD AUTO: 324 THOUSANDS/UL (ref 149–390)
PMV BLD AUTO: 10.3 FL (ref 8.9–12.7)
RBC # BLD AUTO: 4.28 MILLION/UL (ref 3.81–5.12)
WBC # BLD AUTO: 6.04 THOUSAND/UL (ref 4.31–10.16)

## 2023-08-14 PROCEDURE — 80048 BASIC METABOLIC PNL TOTAL CA: CPT

## 2023-08-14 PROCEDURE — 80061 LIPID PANEL: CPT

## 2023-08-14 PROCEDURE — 83036 HEMOGLOBIN GLYCOSYLATED A1C: CPT

## 2023-08-14 PROCEDURE — 85027 COMPLETE CBC AUTOMATED: CPT

## 2023-08-14 PROCEDURE — 36415 COLL VENOUS BLD VENIPUNCTURE: CPT

## 2023-08-14 RX ORDER — BENZONATATE 100 MG/1
100 CAPSULE ORAL 3 TIMES DAILY PRN
Qty: 30 CAPSULE | Refills: 3 | Status: SHIPPED | OUTPATIENT
Start: 2023-08-14

## 2023-08-14 RX ORDER — BENZONATATE 100 MG/1
100 CAPSULE ORAL 3 TIMES DAILY PRN
COMMUNITY
End: 2023-08-14 | Stop reason: SDUPTHER

## 2023-08-14 NOTE — TELEPHONE ENCOUNTER
That medication is not prescribed or stocked anymore. Most coughs just need time and pharmacy and landscape has shifted to the simple argument that the benefit of codeine does not outweigh the risks of codeine beng a narcotic.

## 2023-08-14 NOTE — TELEPHONE ENCOUNTER
Patient requesting refill of promethazine-codeine (PHENERGAN WITH CODEINE) 6.25-10 mg/5 mL syrup     Patient has on going cough     [facility]

## 2023-08-15 LAB
ANION GAP SERPL CALCULATED.3IONS-SCNC: 5 MMOL/L
BUN SERPL-MCNC: 13 MG/DL (ref 5–25)
CALCIUM SERPL-MCNC: 9.2 MG/DL (ref 8.3–10.1)
CHLORIDE SERPL-SCNC: 109 MMOL/L (ref 96–108)
CHOLEST SERPL-MCNC: 175 MG/DL
CO2 SERPL-SCNC: 28 MMOL/L (ref 21–32)
CREAT SERPL-MCNC: 1.15 MG/DL (ref 0.6–1.3)
GFR SERPL CREATININE-BSD FRML MDRD: 49 ML/MIN/1.73SQ M
GLUCOSE P FAST SERPL-MCNC: 91 MG/DL (ref 65–99)
HDLC SERPL-MCNC: 71 MG/DL
LDLC SERPL CALC-MCNC: 90 MG/DL (ref 0–100)
NONHDLC SERPL-MCNC: 104 MG/DL
POTASSIUM SERPL-SCNC: 4.4 MMOL/L (ref 3.5–5.3)
SODIUM SERPL-SCNC: 142 MMOL/L (ref 135–147)
TRIGL SERPL-MCNC: 71 MG/DL

## 2023-08-31 ENCOUNTER — APPOINTMENT (EMERGENCY)
Dept: RADIOLOGY | Facility: HOSPITAL | Age: 68
End: 2023-08-31
Payer: COMMERCIAL

## 2023-08-31 ENCOUNTER — OFFICE VISIT (OUTPATIENT)
Age: 68
End: 2023-08-31
Payer: COMMERCIAL

## 2023-08-31 ENCOUNTER — APPOINTMENT (EMERGENCY)
Dept: CT IMAGING | Facility: HOSPITAL | Age: 68
End: 2023-08-31
Payer: COMMERCIAL

## 2023-08-31 ENCOUNTER — HOSPITAL ENCOUNTER (EMERGENCY)
Facility: HOSPITAL | Age: 68
Discharge: HOME | End: 2023-08-31
Attending: EMERGENCY MEDICINE
Payer: COMMERCIAL

## 2023-08-31 ENCOUNTER — TELEPHONE (OUTPATIENT)
Age: 68
End: 2023-08-31

## 2023-08-31 VITALS
WEIGHT: 151 LBS | TEMPERATURE: 96.9 F | RESPIRATION RATE: 18 BRPM | SYSTOLIC BLOOD PRESSURE: 114 MMHG | BODY MASS INDEX: 28.51 KG/M2 | OXYGEN SATURATION: 99 % | HEART RATE: 84 BPM | DIASTOLIC BLOOD PRESSURE: 70 MMHG | HEIGHT: 61 IN

## 2023-08-31 VITALS
RESPIRATION RATE: 18 BRPM | DIASTOLIC BLOOD PRESSURE: 79 MMHG | BODY MASS INDEX: 28.51 KG/M2 | SYSTOLIC BLOOD PRESSURE: 134 MMHG | HEIGHT: 61 IN | HEART RATE: 69 BPM | WEIGHT: 151 LBS | OXYGEN SATURATION: 99 % | TEMPERATURE: 98.9 F
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
## 2023-08-31 DIAGNOSIS — S06.0X0A CONCUSSION WITHOUT LOSS OF CONSCIOUSNESS, INITIAL ENCOUNTER: Primary | ICD-10-CM

## 2023-08-31 DIAGNOSIS — Z91.81 HISTORY OF FALL: ICD-10-CM

## 2023-08-31 DIAGNOSIS — R42 DIZZINESS: Primary | ICD-10-CM

## 2023-08-31 DIAGNOSIS — S06.0XAA CONCUSSION: ICD-10-CM

## 2023-08-31 DIAGNOSIS — M54.9 ACUTE BACK PAIN, UNSPECIFIED BACK LOCATION, UNSPECIFIED BACK PAIN LATERALITY: ICD-10-CM

## 2023-08-31 LAB
ATRIAL RATE: 70 BPM
P AXIS: 77 DEGREES
PR INTERVAL: 160 MS
QRS AXIS: 66 DEGREES
QRSD INTERVAL: 92 MS
QT INTERVAL: 368 MS
QTC INTERVAL: 397 MS
T WAVE AXIS: 86 DEGREES
VENTRICULAR RATE: 70 BPM

## 2023-08-31 PROCEDURE — 71046 X-RAY EXAM CHEST 2 VIEWS: CPT

## 2023-08-31 PROCEDURE — 99284 EMERGENCY DEPT VISIT MOD MDM: CPT

## 2023-08-31 PROCEDURE — 99214 OFFICE O/P EST MOD 30 MIN: CPT

## 2023-08-31 PROCEDURE — 99284 EMERGENCY DEPT VISIT MOD MDM: CPT | Performed by: EMERGENCY MEDICINE

## 2023-08-31 PROCEDURE — 70450 CT HEAD/BRAIN W/O DYE: CPT

## 2023-08-31 PROCEDURE — 93005 ELECTROCARDIOGRAM TRACING: CPT

## 2023-08-31 PROCEDURE — 93010 ELECTROCARDIOGRAM REPORT: CPT | Performed by: INTERNAL MEDICINE

## 2023-08-31 RX ORDER — KETOROLAC TROMETHAMINE 10 MG/1
10 TABLET, FILM COATED ORAL ONCE
Status: COMPLETED | OUTPATIENT
Start: 2023-08-31 | End: 2023-08-31

## 2023-08-31 RX ORDER — METHOCARBAMOL 500 MG/1
1000 TABLET, FILM COATED ORAL ONCE
Status: COMPLETED | OUTPATIENT
Start: 2023-08-31 | End: 2023-08-31

## 2023-08-31 RX ORDER — KETOROLAC TROMETHAMINE 10 MG/1
10 TABLET, FILM COATED ORAL EVERY 6 HOURS PRN
Qty: 12 TABLET | Refills: 0 | Status: SHIPPED | OUTPATIENT
Start: 2023-08-31

## 2023-08-31 RX ORDER — ONDANSETRON 4 MG/1
4 TABLET, ORALLY DISINTEGRATING ORAL EVERY 8 HOURS PRN
Qty: 20 TABLET | Refills: 0 | Status: SHIPPED | OUTPATIENT
Start: 2023-08-31

## 2023-08-31 RX ORDER — METHOCARBAMOL 500 MG/1
1000 TABLET, FILM COATED ORAL 2 TIMES DAILY
Qty: 30 TABLET | Refills: 0 | Status: SHIPPED | OUTPATIENT
Start: 2023-08-31

## 2023-08-31 RX ADMIN — METHOCARBAMOL 1000 MG: 500 TABLET ORAL at 13:47

## 2023-08-31 RX ADMIN — KETOROLAC TROMETHAMINE 10 MG: 10 TABLET, FILM COATED ORAL at 13:47

## 2023-08-31 NOTE — TELEPHONE ENCOUNTER
Attn: Capri   MRI can not be done out of the ED. Even if she is admitted the MRI will not get done today. Maybe repeat CT? Or put an order in MRI outpatient. she is staying in the ED as of now to get evaluated. Michael Stover will check her chart today to keep updated on what is being done.

## 2023-08-31 NOTE — PATIENT INSTRUCTIONS
Please go to the emergency room immediately. P.S: Fall 2 days ago, with head strike, CT done, however progression of symptoms, concern for bleed at this time, please assist.   Also has pain on shoulders, and lumbar.

## 2023-08-31 NOTE — PROGRESS NOTES
Name: Jaxon Bond      : 1955      MRN: [id number]  Encounter Provider: ANGELA Gunn  Encounter Date: 2023   Encounter department: [address]     1. Dizziness  Patient complaining of dizziness, headache, visual changes, related to falling about 2 days ago. Patient was seen in the emergency room, CT done normal.  No obvious abnormalities noted during exam. Due to progression of symptoms, discussed with patient that she needs to go to the emergency room to rule out bleeding. Hemorrhage vs concussion? If imaging negative, will refer to the concussion clinic. Pt going to ER now for further eval.     2. History of fall  See above. 3. Acute back pain, unspecified back location, unspecified back pain laterality  Due to the fall complaining of back pain, also complaining of some shoulder pain. Subjective      Pt fell about 2 days ago in the shower, while on vacation. Was disoriented but did not lose consciousness. Complaining of pain in the lower back, shoulders. Pt states has a dizziness, headache, and states feel woozy, feels like eyes are heavy and tired. States, has sporadic headache. Fall  The accident occurred 3 to 5 days ago. Fall occurred: in the shower. She fell from a height of 3 to 5 ft. Impact surface: hit into the tub. There was no blood loss. The point of impact was the head. The pain is present in the head, left shoulder, right shoulder and back. The pain is at a severity of 8/10. The pain is severe. Associated symptoms include headaches. Pertinent negatives include no abdominal pain, fever, hematuria or vomiting. She has tried acetaminophen for the symptoms. The treatment provided mild relief. Review of Systems   Constitutional: Negative for chills and fever. HENT: Negative for ear pain and sore throat. Eyes: Positive for visual disturbance. Negative for pain.    Respiratory: Negative for cough and shortness of breath. Cardiovascular: Negative for chest pain and palpitations. Gastrointestinal: Negative for abdominal pain and vomiting. Genitourinary: Negative for dysuria and hematuria. Musculoskeletal: Positive for arthralgias and back pain. Skin: Negative for color change and rash. Neurological: Positive for dizziness and headaches. Negative for seizures and syncope. All other systems reviewed and are negative. Current Outpatient Medications on File Prior to Visit   Medication Sig   • atorvastatin (LIPITOR) 20 mg tablet TAKE 1 TABLET BY MOUTH EVERY DAY WITH DINNER   • cholecalciferol (VITAMIN D3) 1,000 units tablet Take 1,000 Units by mouth daily   • benzonatate (TESSALON PERLES) 100 mg capsule Take 1 capsule (100 mg total) by mouth 3 (three) times a day as needed for cough (Patient not taking: Reported on 8/31/2023)   • [facility] OMEGA-3 KRILL  MG CAPS Take by mouth daily  (Patient not taking: Reported on 6/26/2023)   • Multiple Vitamin (MULTIVITAMIN) capsule Take 1 capsule by mouth daily (Patient not taking: Reported on 6/26/2023)       Objective     /70 (BP Location: Left arm, Patient Position: Sitting, Cuff Size: Standard)   Pulse 84   Temp (!) 96.9 °F (36.1 °C) (Tympanic)   Resp 18   Ht 5' 1" (1.549 m)   Wt 68.5 kg (151 lb)   SpO2 99%   BMI 28.53 kg/m²     Physical Exam  Vitals reviewed. HENT:      Head: Normocephalic. Right Ear: Tympanic membrane normal.      Left Ear: Tympanic membrane normal.      Nose: Nose normal. No congestion or rhinorrhea. Mouth/Throat:      Mouth: Mucous membranes are moist.      Pharynx: No posterior oropharyngeal erythema. Eyes:      Conjunctiva/sclera: Conjunctivae normal.   Cardiovascular:      Rate and Rhythm: Normal rate and regular rhythm. Pulses: Normal pulses. Heart sounds: Normal heart sounds. Pulmonary:      Effort: Pulmonary effort is normal. No respiratory distress. Breath sounds: Normal breath sounds.    Abdominal: General: Bowel sounds are normal.      Palpations: There is no mass. Musculoskeletal:         General: Tenderness (shoulders) present. Normal range of motion. Cervical back: Normal range of motion. Skin:     General: Skin is warm and dry. Neurological:      General: No focal deficit present. Mental Status: She is alert and oriented to person, place, and time. Sensory: No sensory deficit. Coordination: Coordination normal.   Psychiatric:         Mood and Affect: Mood normal.         Behavior: Behavior normal.         Thought Content:  Thought content normal.         Judgment: Judgment normal.       ANGELA Olvera

## 2023-09-01 ENCOUNTER — TELEPHONE (OUTPATIENT)
Age: 68
End: 2023-09-01

## 2023-09-01 DIAGNOSIS — M54.9 ACUTE BACK PAIN, UNSPECIFIED BACK LOCATION, UNSPECIFIED BACK PAIN LATERALITY: Primary | ICD-10-CM

## 2023-09-01 NOTE — TELEPHONE ENCOUNTER
Patient saw Alec Skelton yesterday - was sent to ER.   CT done  Was Rx'd 3 medications    Pharm will not dispense Toradol 10mg until they speak w/ Dr. Waqar West [phone]

## 2023-09-01 NOTE — ED PROVIDER NOTES
History  Chief Complaint   Patient presents with   • Fall     Pt reports fall in bathroom two days ago. +head strike. Was seen at PCP, out pt CT and XR done; was sent here for progressive symptoms and possible repeat CT head. No LOC. No thinners. 69-year-old female patient presents emergency department for evaluation of a head injury. The patient fell while on vacation in [place], was seen in the emergency department there, had a CT scan done which was resulted as negative but today when she followed up with her primary care as instructed to do in the hospital was sent back to the emergency department for a "MRI"    Upon arrival here the patient is awake alert and oriented but she is describing a postconcussive syndrome conglomeration of symptoms. She is awake, alert, oriented to person place time and events. The patient is neurologically intact. The patient is describing a headache with some muscular component to it. The patient will have a repeat CT scan as she was sent here for some sort of imaging because of the continued headache and if there is no acute abnormalities on the CT scan the patient will be treated with antispasmodics, anti-inflammatories, rest, referral to the concussion clinic. History provided by:  Patient   used: No    Fall  Mechanism of injury: fall    Injury location:  Head/neck  Associated symptoms: no back pain, no loss of consciousness and no vomiting    Risk factors: no CABG, no diabetes and no past MI        Prior to Admission Medications   Prescriptions Last Dose Informant Patient Reported? Taking?    [facility] OMEGA-3 KRILL  MG CAPS  Self Yes No   Sig: Take by mouth daily    Patient not taking: Reported on 6/26/2023   Multiple Vitamin (MULTIVITAMIN) capsule  Self Yes No   Sig: Take 1 capsule by mouth daily   Patient not taking: Reported on 6/26/2023   atorvastatin (LIPITOR) 20 mg tablet  Self No No   Sig: TAKE 1 TABLET BY MOUTH EVERY DAY WITH DINNER   benzonatate (TESSALON PERLES) 100 mg capsule   No No   Sig: Take 1 capsule (100 mg total) by mouth 3 (three) times a day as needed for cough   Patient not taking: Reported on 8/31/2023   cholecalciferol (VITAMIN D3) 1,000 units tablet  Self Yes No   Sig: Take 1,000 Units by mouth daily      Facility-Administered Medications: None       Past Medical History:   Diagnosis Date   • Cervical high risk HPV (human papillomavirus) test positive 8/10/2017   • Ovarian cyst    • Postmenopausal atrophic vaginitis    • Sleep apnea, obstructive    • Uterine prolapse        Past Surgical History:   Procedure Laterality Date   • BREAST BIOPSY Left     benign- does not recall when   • COLONOSCOPY  2014   • HYSTERECTOMY  1986   • PARTIAL HYSTERECTOMY      supracervical   • NE COLONOSCOPY FLX DX W/COLLJ SPEC WHEN PFRMD N/A 2/20/2019    Procedure: COLONOSCOPY;  Surgeon: Chandler Vaughan MD;  Location: [facility]; Service: Gastroenterology       Family History   Problem Relation Age of Onset   • No Known Problems Mother    • No Known Problems Father    • Breast cancer Sister 48   • No Known Problems Sister    • No Known Problems Maternal Grandmother    • No Known Problems Paternal Grandmother    • No Known Problems Maternal Aunt    • No Known Problems Maternal Aunt    • No Known Problems Maternal Aunt    • No Known Problems Paternal Aunt    • No Known Problems Paternal Aunt    • Diabetes Other    • Hypertension Other    • Diabetes Family    • Hypertension Family    • Colon cancer Neg Hx    • Ovarian cancer Neg Hx    • Uterine cancer Neg Hx    • Cervical cancer Neg Hx      I have reviewed and agree with the history as documented.     E-Cigarette/Vaping   • E-Cigarette Use Never User      E-Cigarette/Vaping Substances   • Nicotine No    • THC No    • CBD No    • Flavoring No    • Other No    • Unknown No      Social History     Tobacco Use   • Smoking status: Never   • Smokeless tobacco: Never   Vaping Use   • Vaping Use: Never used   Substance Use Topics   • Alcohol use: No   • Drug use: No       Review of Systems   Gastrointestinal: Negative for vomiting. Musculoskeletal: Negative for back pain. Neurological: Negative for loss of consciousness. All other systems reviewed and are negative. Physical Exam  Physical Exam  Vitals and nursing note reviewed. Constitutional:       Appearance: She is well-developed. HENT:      Head: Normocephalic and atraumatic. Right Ear: External ear normal.      Left Ear: External ear normal.   Eyes:      Conjunctiva/sclera: Conjunctivae normal.   Neck:      Thyroid: No thyromegaly. Vascular: No JVD. Trachea: No tracheal deviation. Cardiovascular:      Rate and Rhythm: Normal rate. Pulmonary:      Effort: Pulmonary effort is normal.      Breath sounds: Normal breath sounds. No stridor. Abdominal:      General: There is no distension. Palpations: Abdomen is soft. There is no mass. Tenderness: There is no abdominal tenderness. There is no guarding. Hernia: No hernia is present. Musculoskeletal:         General: No tenderness or deformity. Normal range of motion. Lymphadenopathy:      Cervical: No cervical adenopathy. Skin:     General: Skin is warm. Coloration: Skin is not pale. Findings: No erythema or rash. Neurological:      Mental Status: She is alert and oriented to person, place, and time.    Psychiatric:         Behavior: Behavior normal.         Vital Signs  ED Triage Vitals   Temperature Pulse Respirations Blood Pressure SpO2   08/31/23 1243 08/31/23 1243 08/31/23 1243 08/31/23 1243 08/31/23 1243   98.9 °F (37.2 °C) 83 18 145/73 99 %      Temp Source Heart Rate Source Patient Position - Orthostatic VS BP Location FiO2 (%)   08/31/23 1243 08/31/23 1243 08/31/23 1243 08/31/23 1243 --   Oral Monitor Sitting Left arm       Pain Score       08/31/23 1347       8           Vitals:    08/31/23 1243 08/31/23 1321 08/31/23 1500   BP: 145/73 143/84 134/79   Pulse: 83 73 69   Patient Position - Orthostatic VS: Sitting Lying          Visual Acuity  Visual Acuity    Flowsheet Row Most Recent Value   L Pupil Size (mm) 3   R Pupil Size (mm) 3          ED Medications  Medications   ketorolac (TORADOL) tablet 10 mg (10 mg Oral Given 8/31/23 1347)   methocarbamol (ROBAXIN) tablet 1,000 mg (1,000 mg Oral Given 8/31/23 1347)       Diagnostic Studies  Results Reviewed     None                 CT head without contrast   Final Result by Brit Mckoy MD (08/31 1408)      No acute intracranial abnormality. Workstation performed: [id number]         XR chest 2 views   Final Result by Starla Henry MD (08/31 1506)      No active disease. Resident: Cindy Leon, the attending radiologist, have reviewed the images and agree with the final report above. Workstation performed: [id number]                    Procedures  Procedures         ED Course                               SBIRT 22yo+    Flowsheet Row Most Recent Value   Initial Alcohol Screen: US AUDIT-C     1. How often do you have a drink containing alcohol? 0 Filed at: 08/31/2023 1320   2. How many drinks containing alcohol do you have on a typical day you are drinking? 0 Filed at: 08/31/2023 1320   3b. FEMALE Any Age, or MALE 65+: How often do you have 4 or more drinks on one occassion? 0 Filed at: 08/31/2023 1320   Audit-C Score 0 Filed at: 08/31/2023 1320   DIO: How many times in the past year have you. .. Used an illegal drug or used a prescription medication for non-medical reasons? Never Filed at: 08/31/2023 1320                    Medical Decision Making  Concussion: acute illness or injury  Fall, initial encounter: acute illness or injury  Amount and/or Complexity of Data Reviewed  Radiology: ordered. Risk  Prescription drug management.           Disposition  Final diagnoses:   Fall, initial encounter   Concussion Time reflects when diagnosis was documented in both MDM as applicable and the Disposition within this note     Time User Action Codes Description Comment    8/31/2023  3:02 PM Kirit Jose Add [O21. JGUV] Fall, initial encounter     8/31/2023  3:02 PM Kirit Garcia Add [S06. 0XAA] Concussion       ED Disposition     ED Disposition   Discharge    Condition   Stable    Date/Time   Thu Aug 31, 2023  3:02 PM    [address] discharge to home/self care.                Follow-up Information     Follow up With Specialties Details Why Contact Info    Doyle Linton DO Internal Medicine   [address] [facility] [address]            Discharge Medication List as of 8/31/2023  3:04 PM      START taking these medications    Details   ketorolac (TORADOL) 10 mg tablet Take 1 tablet (10 mg total) by mouth every 6 (six) hours as needed for moderate pain, Starting Thu 8/31/2023, Normal      methocarbamol (ROBAXIN) 500 mg tablet Take 2 tablets (1,000 mg total) by mouth 2 (two) times a day, Starting Thu 8/31/2023, Normal      ondansetron (ZOFRAN-ODT) 4 mg disintegrating tablet Take 1 tablet (4 mg total) by mouth every 8 (eight) hours as needed for nausea, Starting Thu 8/31/2023, Normal         CONTINUE these medications which have NOT CHANGED    Details   atorvastatin (LIPITOR) 20 mg tablet TAKE 1 TABLET BY MOUTH EVERY DAY WITH DINNER, Normal      benzonatate (TESSALON PERLES) 100 mg capsule Take 1 capsule (100 mg total) by mouth 3 (three) times a day as needed for cough, Starting Mon 8/14/2023, Normal      cholecalciferol (VITAMIN D3) 1,000 units tablet Take 1,000 Units by mouth daily, Historical Med      [facility] OMEGA-3 KRILL  MG CAPS Take by mouth daily , Historical Med      Multiple Vitamin (MULTIVITAMIN) capsule Take 1 capsule by mouth daily, Historical Med                 PDMP Review       Value Time User    PDMP Reviewed  Yes 8/1/2022 10:37 AM Doyle Linton DO          ED Provider  Electronically Signed by           Xu Odom DO  08/31/23 3930

## 2023-09-27 PROBLEM — Z01.419 ENCOUNTER FOR ANNUAL ROUTINE GYNECOLOGICAL EXAMINATION: Status: RESOLVED | Noted: 2023-06-26 | Resolved: 2023-09-27

## 2023-09-28 ENCOUNTER — OFFICE VISIT (OUTPATIENT)
Age: 68
End: 2023-09-28
Payer: COMMERCIAL

## 2023-09-28 VITALS
HEIGHT: 61 IN | DIASTOLIC BLOOD PRESSURE: 64 MMHG | SYSTOLIC BLOOD PRESSURE: 106 MMHG | BODY MASS INDEX: 29.98 KG/M2 | WEIGHT: 158.8 LBS | HEART RATE: 76 BPM | OXYGEN SATURATION: 99 % | TEMPERATURE: 98.4 F

## 2023-09-28 DIAGNOSIS — R73.03 PREDIABETES: Chronic | ICD-10-CM

## 2023-09-28 DIAGNOSIS — E78.00 HYPERCHOLESTEROLEMIA: Primary | Chronic | ICD-10-CM

## 2023-09-28 DIAGNOSIS — M19.049 CMC ARTHRITIS: Chronic | ICD-10-CM

## 2023-09-28 DIAGNOSIS — G56.03 BILATERAL CARPAL TUNNEL SYNDROME: Chronic | ICD-10-CM

## 2023-09-28 PROBLEM — H26.9 CATARACTS, BILATERAL: Chronic | Status: ACTIVE | Noted: 2023-09-28

## 2023-09-28 PROBLEM — H26.9 CATARACTS, BILATERAL: Status: ACTIVE | Noted: 2023-09-28

## 2023-09-28 PROCEDURE — 99214 OFFICE O/P EST MOD 30 MIN: CPT | Performed by: INTERNAL MEDICINE

## 2023-09-28 NOTE — PROGRESS NOTES
Name: Erlin Cole      : 1955      MRN: [id number]  Encounter Provider: Terri Lawson DO  Encounter Date: 2023   Encounter department: [address]     1. Hypercholesterolemia    Heart healthy diet. Continue atorvastatin as prescribed. - CBC; Future  - Comprehensive metabolic panel; Future  - Lipid Panel with Direct LDL reflex; Future    2. Prediabetes    Most recent A1c was 6.2 % on 2023. She did get steroids in the past 3 months which may falsely elevate the A1c. Watch carbohydrate intake. Stay active. Weight loss advised. We will repeat in the future. - Hemoglobin A1C; Future  - Albumin / creatinine urine ratio; Future    3. CMC arthritis  4. Bilateral carpal tunnel syndrome    Reviewed previous hand surgery consult. She got injections into her CMC joints. Does have some ultrasound findings of bilateral carpal tunnel syndrome. No ultrasound findings for cubital tunnel syndrome. Follow-up with hand surgery. - Ambulatory Referral to Hand Surgery; Future     Depression Screening and Follow-up Plan: Patient was screened for depression during today's encounter. They screened negative with a PHQ-2 score of 0. Return in about 6 months (around 2024) for Follow-up. Subjective      Patient presents for routine follow-up. Overall doing well except having musculoskeletal issues with CMC arthritis and carpal tunnel syndrome. She had an abnormal musculoskeletal ultrasound but never follow-up with hand surgery. Was never told to follow-up or to make a follow-up appointment. Review of Systems   Constitutional:  Negative for activity change, appetite change and fatigue. Respiratory:  Negative for apnea, cough, chest tightness, shortness of breath and wheezing. Cardiovascular:  Negative for chest pain, palpitations and leg swelling.    Gastrointestinal:  Negative for abdominal distention, abdominal pain, blood in stool, constipation, diarrhea, nausea and vomiting. Musculoskeletal:  Positive for arthralgias. Neurological:  Positive for numbness. Negative for dizziness, weakness, light-headedness and headaches. Psychiatric/Behavioral:  Negative for behavioral problems, confusion, hallucinations, sleep disturbance and suicidal ideas. The patient is not nervous/anxious. Objective     /64   Pulse 76   Temp 98.4 °F (36.9 °C) (Tympanic)   Ht 5' 1" (1.549 m)   Wt 72 kg (158 lb 12.8 oz)   SpO2 99%   BMI 30.00 kg/m²     Physical Exam  Constitutional:       General: She is not in acute distress. Appearance: She is well-developed. She is not diaphoretic. Neck:      Thyroid: No thyromegaly. Vascular: No JVD. Cardiovascular:      Rate and Rhythm: Normal rate and regular rhythm. Heart sounds: Normal heart sounds. No murmur heard. Pulmonary:      Effort: Pulmonary effort is normal. No respiratory distress. Breath sounds: Normal breath sounds. No wheezing or rales. Abdominal:      General: Bowel sounds are normal. There is no distension. Palpations: Abdomen is soft. There is no mass. Tenderness: There is no abdominal tenderness. There is no guarding or rebound. Musculoskeletal:      Right lower leg: No edema. Left lower leg: No edema. Neurological:      Mental Status: She is alert.        Magaly Govea DO

## 2023-10-31 ENCOUNTER — TELEPHONE (OUTPATIENT)
Age: 68
End: 2023-10-31

## 2023-10-31 NOTE — TELEPHONE ENCOUNTER
Attn: Juan  Pt brought in SCLE paper at her last visit 9/28/2023 gave them to [facility] to complete. S/D is 11/14/2023 [facility] [place] is looking for the papers. Please fill out form and fax to [facility].

## 2023-11-07 ENCOUNTER — OFFICE VISIT (OUTPATIENT)
Dept: OBGYN CLINIC | Facility: CLINIC | Age: 68
End: 2023-11-07
Payer: COMMERCIAL

## 2023-11-07 VITALS — BODY MASS INDEX: 29.83 KG/M2 | HEIGHT: 61 IN | WEIGHT: 158 LBS

## 2023-11-07 DIAGNOSIS — M19.049 CMC ARTHRITIS: Chronic | ICD-10-CM

## 2023-11-07 DIAGNOSIS — G56.03 BILATERAL CARPAL TUNNEL SYNDROME: Primary | Chronic | ICD-10-CM

## 2023-11-07 DIAGNOSIS — M54.12 CERVICAL RADICULOPATHY: ICD-10-CM

## 2023-11-07 PROCEDURE — 99214 OFFICE O/P EST MOD 30 MIN: CPT | Performed by: STUDENT IN AN ORGANIZED HEALTH CARE EDUCATION/TRAINING PROGRAM

## 2023-11-07 NOTE — PROGRESS NOTES
ORTHOPAEDIC HAND, WRIST, AND ELBOW OFFICE  VISIT       ASSESSMENT/PLAN:      Diagnoses and all orders for this visit:    Bilateral carpal tunnel syndrome  -     Ambulatory Referral to Hand Surgery  -     EMG 2 Limb Upper Extremity; Future    CMC arthritis  -     Ambulatory Referral to Hand Surgery    Cervical radiculopathy  -     Ambulatory referral to Spine & Pain Management; Future          76 y.o. female with bilateral carpal tunnel   Treatment options and expected outcomes were discussed. The patient verbalized understanding of exam findings and treatment plan. The patient was given the opportunity to ask questions. Questions were answered to the patient's satisfaction. Bilateral carpal tunnel CSI offered but declined by patient  Continue use of comfort cool and night splints   EMG ordered for further evaluation   Referral to Spine and Pain provided based on patient's symptoms of entire upper extremity numbness and tingling. And positive spurlings on exam      Follow Up: After testing       To Do Next Visit:  Re-evaluation of current issue      Discussions:  Carpal Tunnel Syndrome: The anatomy and physiology of carpal tunnel syndrome was discussed with the patient today. Increase pressure localized under the transverse carpal ligament can cause pain, numbness, tingling, or dysesthesias within the median nerve distribution as well as feelings of fatigue, clumsiness, or awkwardness. These symptoms typically occur at night and worse in the morning upon waking. Eventually, untreated carpal tunnel syndrome can result in weakness and permanent loss of muscle within the thenar compartment of the hand. Treatment options were discussed with the patient. Conservative treatment includes nocturnal resting splints to keep the nerve in a neutral position, ergonomic changes within the work or home environment, activity modification, and tendon gliding exercises.  Vitamin B6 one tablet daily over the counter may helpful to reduce symptoms. Steroid injections within the carpal canal can help a majority of patients, however this is often self-limited in a majority of patients. Surgical intervention to divide the transverse carpal ligament typically results in a long-lasting relief of the patient's complaints, with the recurrence rate of less than 1%. and Endoscopic Carpal Tunnel Release: The anatomy and physiology of carpal tunnel syndrome was discussed with the patient today. Increase pressure localized under the transverse carpal ligament can cause pain, numbness, tingling, or dysesthesias within the median nerve distribution as well as feelings of fatigue, clumsiness, or awkwardness. These symptoms typically occur at night and worse in the morning upon waking. Eventually, untreated carpal tunnel syndrome can result in weakness and permanent loss of muscle within the thenar compartment of the hand. Treatment options were discussed with the patient. Conservative treatment includes nocturnal resting splints to keep the nerve in a neutral position, ergonomic changes within the work or home environment, activity modification, and tendon gliding exercises. Vitamin B6 one tablet daily over the counter may helpful to reduce symptoms. Steroid injections within the carpal canal can help a majority of patients, however this is often self-limited in a majority of patients. Surgical intervention to divide the transverse carpal ligament typically results in a long-lasting relief of the patient's complaints, with the recurrence rate of less than 1%. The patient has elected to undergo an endoscopic carpal tunnel release.   The single incision technique was discussed with the patient, which results in approximately a two-week recovery time less wound complications. In the postoperative period, light activities are allowed immediately, driving is allowed when narcotic medication has stopped, and the bandages may be removed and incision may get wet after 2 days. Heavy activities (lifting more than approximately 10 pounds) will be allowed after follow up appointment in 1-2 weeks. While night symptoms (waking from sleep, pain, and discomfort in the hands) generally improves rapidly, the numbness and tingling as well as the strength will slowly improve over weeks to months depending on the chronicity and severity of the carpal tunnel syndrome. Pillar pain and scar discomfort were discussed with the patient which are self-limiting conditions. The risks of bleeding and infection from the surgery are less than 1%. Risk of recurrence is approximately 0.5%. The risks of nerve injury or nerve damage or damage to the blood vessels is approximately 1 in 1200. The patient has an understanding of the above mentioned discussion. The risks and benefits of the procedure were explained to the patient, which include, but are not limited to: Bleeding, infection, recurrence, pain, scar, damage to tendons, damage to nerves, and damage to blood vessels, failure to give desired results and complications related to anesthesia. These risks, along with alternative conservative treatment options, and postoperative protocols were voiced back and understood by the patient. All questions were answered to the patient's satisfaction. The patient agrees to comply with a standard postoperative protocol, and is willing to proceed. Education was provided via written and auditory forms. There were no barriers to learning. Written handouts regarding wound care, incision and scar care, and general preoperative information was provided to the patient. Prior to surgery, the patient may be requested to stop all anti-inflammatory medications.   Prophylactic aspirin, Plavix, and Coumadin may be allowed to be continued. Medications including vitamin E., ginkgo, & fish oil are requested to be stopped about one week. Brina Rivero MD  Attending, Orthopaedic Surgery  Hand, Wrist, and Elbow Surgery  [address] [facility]    ____________________________________________________________________________________________________________________________________________      CHIEF COMPLAINT:  Chief Complaint   Patient presents with   • Left Wrist - Pain   • Right Wrist - Pain       SUBJECTIVE:  Patient is a 76 y.o. RHD female who presents today for evaluation and treatment of bilateral upper extremity numbness and tingling. Patient notes an over all heaviness in her upper extremity that accompanies the numbness. Symptoms are intermittent, and radiate from shoulder to hand. Patient also has bilateral CMC osteoarthritis. She was previously seen by Dr Genaro Ferraro who administered bilateral [facility] CSI 6/15/2023. Patient reports no benefit from these. She has been using comfort cool braces but does not use her night splints. I have personally reviewed all the relevant PMH, PSH, SH, FH, Medications and allergies      PAST MEDICAL HISTORY:  Past Medical History:   Diagnosis Date   • Cervical high risk HPV (human papillomavirus) test positive 8/10/2017   • Ovarian cyst    • Postmenopausal atrophic vaginitis    • Sleep apnea, obstructive    • Uterine prolapse        PAST SURGICAL HISTORY:  Past Surgical History:   Procedure Laterality Date   • BREAST BIOPSY Left     benign- does not recall when   • COLONOSCOPY  2014   • HYSTERECTOMY  1986   • PARTIAL HYSTERECTOMY      supracervical   • MS COLONOSCOPY FLX DX W/COLLJ SPEC WHEN PFRMD N/A 2/20/2019    Procedure: COLONOSCOPY;  Surgeon: Armen Ragsdale MD;  Location: [facility];   Service: Gastroenterology       FAMILY HISTORY:  Family History   Problem Relation Age of Onset   • No Known Problems Mother    • No Known Problems Father    • Breast cancer Sister 48   • No Known Problems Sister    • No Known Problems Maternal Grandmother    • No Known Problems Paternal Grandmother    • No Known Problems Maternal Aunt    • No Known Problems Maternal Aunt    • No Known Problems Maternal Aunt    • No Known Problems Paternal Aunt    • No Known Problems Paternal Aunt    • Diabetes Other    • Hypertension Other    • Diabetes Family    • Hypertension Family    • Colon cancer Neg Hx    • Ovarian cancer Neg Hx    • Uterine cancer Neg Hx    • Cervical cancer Neg Hx        SOCIAL HISTORY:  Social History     Tobacco Use   • Smoking status: Never   • Smokeless tobacco: Never   Vaping Use   • Vaping Use: Never used   Substance Use Topics   • Alcohol use: No   • Drug use: No       MEDICATIONS:    Current Outpatient Medications:   •  atorvastatin (LIPITOR) 20 mg tablet, TAKE 1 TABLET BY MOUTH EVERY DAY WITH DINNER, Disp: 90 tablet, Rfl: 1  •  cholecalciferol (VITAMIN D3) 1,000 units tablet, Take 1,000 Units by mouth daily, Disp: , Rfl:     ALLERGIES:  No Known Allergies        REVIEW OF SYSTEMS:  Musculoskeletal:        As noted in HPI. All other systems reviewed and are negative. VITALS:  There were no vitals filed for this visit.     LABS:  HgA1c:   Lab Results   Component Value Date    HGBA1C 6.2 (H) 08/14/2023     BMP:   Lab Results   Component Value Date    CALCIUM 9.2 08/14/2023    K 4.4 08/14/2023    CO2 28 08/14/2023     (H) 08/14/2023    BUN 13 08/14/2023    CREATININE 1.15 08/14/2023       _____________________________________________________  PHYSICAL EXAMINATION:  General: Well developed and well nourished, alert & oriented x 3, appears comfortable  Psychiatric: Normal  HEENT: Normocephalic, Atraumatic Trachea Midline, No torticollis  Pulmonary: No audible wheezing or respiratory distress   Abdomen/GI: Non tender, non distended   Cardiovascular: No pitting edema, 2+ radial pulse   Skin: No masses, erythema, lacerations, fluctation, ulcerations  Neurovascular: Sensation Intact to the Median, Ulnar, Radial Nerve, Motor Intact to the Median, Ulnar, Radial Nerve, and Pulses Intact  Musculoskeletal: Normal, except as noted in detailed exam and in HPI.       MUSCULOSKELETAL EXAMINATION:  RUE  (+) tinel at wrist    (-) tinels at the elbow  NTTP  Full composite fist   Brisk capillary refill  AROM WNL    LUE  (-) tinel at wrist   (-) tinels at the elbow  NTTP  Full composite fist  Brisk capillary refill   AROM WNL    (+) spurlings on the right   ___________________________________________________  STUDIES REVIEWED:  Ultrasound report was reviewed and demonstrates finding suspicious for carpal tunnel syndrome with median nerve WFR ratio > 1.4  Last HbA1c 6.2  Prior bilateral hand x-rays reviewed    PROCEDURES PERFORMED:  Procedures  No Procedures performed today    _____________________________________________________      Britt Cao    I,:  Maria Luisa Garza am acting as a scribe while in the presence of the attending physician.:       I,:  Demond Love MD personally performed the services described in this documentation    as scribed in my presence.:

## 2023-12-18 NOTE — TELEPHONE ENCOUNTER
Arnel Mosqueda I'll put one in, but I see that one is already put in and the status states that it is scheduled
Needs order put in sytem for a mammogram  She is having just a regular routine mammo done    thanks
Please advise, thank you
Spoke with patient  She has an appointment on March 8, 2018 
RLE amputation
RIGHt foot

## 2023-12-31 DIAGNOSIS — E78.00 HYPERCHOLESTEROLEMIA: Chronic | ICD-10-CM

## 2023-12-31 RX ORDER — ATORVASTATIN CALCIUM 20 MG/1
TABLET, FILM COATED ORAL
Qty: 90 TABLET | Refills: 1 | Status: SHIPPED | OUTPATIENT
Start: 2023-12-31

## 2024-01-16 ENCOUNTER — TELEPHONE (OUTPATIENT)
Dept: NEUROLOGY | Facility: CLINIC | Age: 69
End: 2024-01-16

## 2024-01-16 NOTE — TELEPHONE ENCOUNTER
Attempted to contact patient to confirm their EMG appointment for today 1/16/24. Message said not available and hung up.

## 2024-01-19 ENCOUNTER — TELEPHONE (OUTPATIENT)
Age: 69
End: 2024-01-19

## 2024-02-12 ENCOUNTER — TELEPHONE (OUTPATIENT)
Age: 69
End: 2024-02-12

## 2024-02-12 NOTE — TELEPHONE ENCOUNTER
Patient drop off clearance form for [facility] [place] ..   Put into Dr. Martinez in bin

## 2024-02-22 ENCOUNTER — PROCEDURE VISIT (OUTPATIENT)
Dept: NEUROLOGY | Facility: CLINIC | Age: 69
End: 2024-02-22
Payer: COMMERCIAL

## 2024-02-22 DIAGNOSIS — G56.03 BILATERAL CARPAL TUNNEL SYNDROME: Chronic | ICD-10-CM

## 2024-02-22 PROCEDURE — 95910 NRV CNDJ TEST 7-8 STUDIES: CPT | Performed by: PSYCHIATRY & NEUROLOGY

## 2024-02-22 PROCEDURE — 95886 MUSC TEST DONE W/N TEST COMP: CPT | Performed by: PSYCHIATRY & NEUROLOGY

## 2024-02-22 NOTE — PROGRESS NOTES
EMG 2 Limb Upper Extremity     Date/Time  2/22/2024 10:20 AM     Performed by  Kennedy Harmon MD   Authorized by  Larry Davila MD           EMG BILATERAL UPPER EXTREMITIES    Patient reports symptoms of numbness and tingling in the hands    Motor and sensory conduction studies were performed on the median and ulnar nerves and radial sensory nerves bilaterally. The distal motor latencies were normal. The motor action potential amplitudes were normal. Motor conduction velocities were normal including conduction of the ulnar nerve across the elbow.    The median and ulnar F waves were normal bilaterally.    Median, radial and ulnar sensory latencies were normal bilaterally including median palmar stimulation with normal sensory action potential amplitudes.    Concentric needle EMG was performed in various distal and proximal muscles of the upper extremities bilaterally including APB, FDI, EDC, brachioradialis, biceps, triceps in the low cervical paraspinal region. There was no evidence of spontaneous activity seen. The compound motor unit potentials were of normal configuration and interference patterns were full or full for effort    IMPRESSION: This is a normal EMG of the bilateral upper extremities    Kennedy Harmon M.D.      [facility] [id number] [phone]    Electromyography & Nerve Conduction Studies Report          Full Name: TRACEE KLINE Gender: Female  Patient ID: [id number] YOB: 1955      Visit Date: 2/22/2024 10:23 AM  Age: 69 Years  Examining Physician: DR. HARMON  Referring Physician: DR. DAVILA  Technologist: MAYCOL  Temperature: 33  Height: 5 feet 4 inch      Sensory Nerve Conduction Study       Nerve / Sites Rec. Site Onset Lat Peak Lat  Amp Segments Distance Peak Diff Velocity     ms ms µV  cm ms m/s   R Median - Dig II (Antidromic).      Wrist Index 2.2 3.0 29.3 Wrist - Index 14  63      Ref.  ?3.4   ?20.0 Ref.   ?50   L Median - Dig II (Antidromic).      Wrist Index 2.0 2.7 34.8 Wrist - Index 14  71      Ref.  ?3.4  ?20.0 Ref.   ?50   R Ulnar - Dig V (Antidromic).      Wrist Dig V 1.9 2.6 24.3 Wrist - Dig V 12  62      Ref.  ?2.9  ?17.0 Ref.   ?50   L Ulnar - Dig V (Antidromic).      Wrist Dig V 1.7 2.4 27.5 Wrist - Dig V 12  70      Ref.  ?2.9  ?17.0 Ref.   ?50   R Median, Ulnar - Palmar      Median Palm Wrist 1.3 1.7 79.7 Median Palm - Wrist 8  64      Ref.   ?2.2 ?50.0 Ref.   ?50      Ulnar Palm Wrist 1.1 1.5 33.6 Ulnar Palm - Wrist 8  73      Ref.   ?2.2 ?12.0 Ref.   ?50        Median Palm - Ulnar Palm  0.2         Ref.  ?0.4    L Median, Ulnar - Palmar      Median Palm Wrist 1.3 1.6 54.0 Median Palm - Wrist 8  64      Ref.   ?2.2 ?50.0 Ref.   ?50      Ulnar Palm Wrist 1.1 1.4 49.9 Ulnar Palm - Wrist 8  73      Ref.   ?2.2 ?12.0 Ref.   ?50        Median Palm - Ulnar Palm  0.2         Ref.  ?0.4    R Radial - Superficial (Antidromic)      Forearm Wrist 1.5 2.1 20.8 Forearm - Wrist 10  66      Ref.   ?2.9 ?15.0 Ref.   ?50   L Radial - Superficial (Antidromic)      Forearm Wrist 1.6 2.3 22.4 Forearm - Wrist 10  62      Ref.   ?2.9 ?15.0 Ref.   ?50       Motor Nerve Conduction Study       Nerve / Sites Muscle Latency Ref. Amplitude Ref. Segments Distance Lat Diff Velocity Ref.     ms ms mV mV  cm ms m/s m/s   R Median - APB      Wrist APB 3.3 ?4.4 7.3 ?4.0 Wrist - APB 7         Elbow APB 7.4  6.5  Elbow - Wrist 21 4.10 51 ?49   L Median - APB      Wrist APB 2.7 ?4.4 12.3 ?4.0 Wrist - APB 7         Elbow APB 6.3  10.9  Elbow - Wrist 22 3.56 62 ?49   R Ulnar - ADM      Wrist ADM 2.0 ?3.3 9.6 ?6.0 Wrist - ADM 7         B.Elbow ADM 5.0  9.5  B.Elbow - Wrist 20 2.98 67 ?49      A.Elbow ADM 7.0  9.1  A.Elbow - B.Elbow 10 2.00 50 ?49   L Ulnar - ADM      Wrist ADM 2.0 ?3.3 10.5 ?6.0 Wrist - ADM 7         B.Elbow ADM 5.5  10.4  B.Elbow - Wrist 20 3.48 57 ?49      A.Elbow ADM 7.0  10.2  A.Elbow - B.Elbow 10 1.52 66 ?49        F Waves       Nerve F Latency M Latency F - M Lat    ms ms ms   R Median - APB 24.1 3.3 20.8   R Ulnar - ADM 24.8 2.4 22.4   L Median - APB 22.9 3.1 19.7   L Ulnar - ADM 24.5 2.6 22.0       Needle EMG Examination     Insertional Spontaneous MUAP   Muscle Nerve Roots Activity Fib PSW Fasc Dur. Amp Poly Config Recruitment   L. First dorsal interosseous Ulnar C8-T1 Normal None None None Normal Normal None Normal Normal   R. First dorsal interosseous Ulnar C8-T1 Normal None None None Normal Normal None Normal Normal   R. Abductor pollicis longus Radial C7-C8 Normal None None None Normal Normal None Normal Normal   L. Abductor pollicis longus Radial C7-C8 Normal None None None Normal Normal None Normal Normal   R. Brachioradialis Radial C5-C6 Normal None None None Normal Normal None Normal Normal   L. Brachioradialis Radial C5-C6 Normal None None None Normal Normal None Normal Normal   R. Extensor digitorum communis Radial C7-C8 Normal None None None Normal Normal None Normal Normal   L. Extensor digitorum communis Radial C7-C8 Normal None None None Normal Normal None Normal Normal   R. Biceps brachii Musculocutaneous C5-C6 Normal None None None Normal Normal None Normal Normal   L. Biceps brachii Musculocutaneous C5-C6 Normal None None None Normal Normal None Normal Normal   R. Triceps brachii Radial C6-C8 Normal None None None Normal Normal None Normal Normal   L. Triceps brachii Radial C6-C8 Normal None None None Normal Normal None Normal Normal   R. Cervical paraspinals Spinal C4-C8 Normal None None None Normal Normal None Normal Normal   L. Cervical paraspinals Spinal C4-C8 Normal None None None Normal Normal None Normal Normal

## 2024-02-23 ENCOUNTER — TELEPHONE (OUTPATIENT)
Age: 69
End: 2024-02-23

## 2024-02-23 NOTE — TELEPHONE ENCOUNTER
Called and spoke with patient. Discussed EMG results were normal. Discussed options to proceed with carpal tunnel release or try injection. Patient stated she will reach out to schedule follow-up next month.

## 2024-02-23 NOTE — TELEPHONE ENCOUNTER
Caller: Patient    Doctor: Sadaf    Reason for call: patient would like a phone call with the results of her EMG.    Call back#: [phone] or [phone]

## 2024-03-15 ENCOUNTER — APPOINTMENT (OUTPATIENT)
Age: 69
End: 2024-03-15
Payer: COMMERCIAL

## 2024-03-15 DIAGNOSIS — R73.03 PREDIABETES: Chronic | ICD-10-CM

## 2024-03-15 DIAGNOSIS — E78.00 HYPERCHOLESTEROLEMIA: Chronic | ICD-10-CM

## 2024-03-15 LAB
ALBUMIN SERPL BCP-MCNC: 4.1 G/DL (ref 3.5–5)
ALP SERPL-CCNC: 82 U/L (ref 34–104)
ALT SERPL W P-5'-P-CCNC: 16 U/L (ref 7–52)
ANION GAP SERPL CALCULATED.3IONS-SCNC: 7 MMOL/L (ref 4–13)
AST SERPL W P-5'-P-CCNC: 18 U/L (ref 13–39)
BILIRUB SERPL-MCNC: 0.52 MG/DL (ref 0.2–1)
BUN SERPL-MCNC: 16 MG/DL (ref 5–25)
CALCIUM SERPL-MCNC: 9 MG/DL (ref 8.4–10.2)
CHLORIDE SERPL-SCNC: 108 MMOL/L (ref 96–108)
CHOLEST SERPL-MCNC: 156 MG/DL
CO2 SERPL-SCNC: 27 MMOL/L (ref 21–32)
CREAT SERPL-MCNC: 0.99 MG/DL (ref 0.6–1.3)
CREAT UR-MCNC: 163.1 MG/DL
ERYTHROCYTE [DISTWIDTH] IN BLOOD BY AUTOMATED COUNT: 13.4 % (ref 11.6–15.1)
EST. AVERAGE GLUCOSE BLD GHB EST-MCNC: 128 MG/DL
GFR SERPL CREATININE-BSD FRML MDRD: 58 ML/MIN/1.73SQ M
GLUCOSE P FAST SERPL-MCNC: 92 MG/DL (ref 65–99)
HBA1C MFR BLD: 6.1 %
HCT VFR BLD AUTO: 38.5 % (ref 34.8–46.1)
HDLC SERPL-MCNC: 57 MG/DL
HGB BLD-MCNC: 12.2 G/DL (ref 11.5–15.4)
LDLC SERPL CALC-MCNC: 87 MG/DL (ref 0–100)
MCH RBC QN AUTO: 30 PG (ref 26.8–34.3)
MCHC RBC AUTO-ENTMCNC: 31.7 G/DL (ref 31.4–37.4)
MCV RBC AUTO: 95 FL (ref 82–98)
MICROALBUMIN UR-MCNC: 7.6 MG/L
MICROALBUMIN/CREAT 24H UR: 5 MG/G CREATININE (ref 0–30)
PLATELET # BLD AUTO: 328 THOUSANDS/UL (ref 149–390)
PMV BLD AUTO: 10.4 FL (ref 8.9–12.7)
POTASSIUM SERPL-SCNC: 4.3 MMOL/L (ref 3.5–5.3)
PROT SERPL-MCNC: 6.5 G/DL (ref 6.4–8.4)
RBC # BLD AUTO: 4.06 MILLION/UL (ref 3.81–5.12)
SODIUM SERPL-SCNC: 142 MMOL/L (ref 135–147)
TRIGL SERPL-MCNC: 62 MG/DL
WBC # BLD AUTO: 4.48 THOUSAND/UL (ref 4.31–10.16)

## 2024-03-15 PROCEDURE — 80053 COMPREHEN METABOLIC PANEL: CPT

## 2024-03-15 PROCEDURE — 82043 UR ALBUMIN QUANTITATIVE: CPT

## 2024-03-15 PROCEDURE — 85027 COMPLETE CBC AUTOMATED: CPT

## 2024-03-15 PROCEDURE — 83036 HEMOGLOBIN GLYCOSYLATED A1C: CPT

## 2024-03-15 PROCEDURE — 82570 ASSAY OF URINE CREATININE: CPT

## 2024-03-15 PROCEDURE — 80061 LIPID PANEL: CPT

## 2024-03-15 PROCEDURE — 36415 COLL VENOUS BLD VENIPUNCTURE: CPT

## 2024-04-01 ENCOUNTER — OFFICE VISIT (OUTPATIENT)
Age: 69
End: 2024-04-01
Payer: COMMERCIAL

## 2024-04-01 ENCOUNTER — PREP FOR PROCEDURE (OUTPATIENT)
Age: 69
End: 2024-04-01

## 2024-04-01 ENCOUNTER — APPOINTMENT (OUTPATIENT)
Age: 69
End: 2024-04-01
Payer: COMMERCIAL

## 2024-04-01 ENCOUNTER — TELEPHONE (OUTPATIENT)
Age: 69
End: 2024-04-01

## 2024-04-01 VITALS
BODY MASS INDEX: 29.72 KG/M2 | TEMPERATURE: 97.3 F | HEIGHT: 61 IN | RESPIRATION RATE: 17 BRPM | OXYGEN SATURATION: 96 % | HEART RATE: 73 BPM | SYSTOLIC BLOOD PRESSURE: 114 MMHG | DIASTOLIC BLOOD PRESSURE: 72 MMHG | WEIGHT: 157.4 LBS

## 2024-04-01 DIAGNOSIS — M54.50 CHRONIC BILATERAL LOW BACK PAIN WITHOUT SCIATICA: ICD-10-CM

## 2024-04-01 DIAGNOSIS — N18.31 STAGE 3A CHRONIC KIDNEY DISEASE (HCC): ICD-10-CM

## 2024-04-01 DIAGNOSIS — E78.00 HYPERCHOLESTEROLEMIA: Chronic | ICD-10-CM

## 2024-04-01 DIAGNOSIS — R73.03 PREDIABETES: Primary | Chronic | ICD-10-CM

## 2024-04-01 DIAGNOSIS — Z00.00 MEDICARE ANNUAL WELLNESS VISIT, SUBSEQUENT: ICD-10-CM

## 2024-04-01 DIAGNOSIS — Z86.010 PERSONAL HISTORY OF COLONIC POLYPS: Primary | ICD-10-CM

## 2024-04-01 DIAGNOSIS — G89.29 CHRONIC BILATERAL LOW BACK PAIN WITHOUT SCIATICA: ICD-10-CM

## 2024-04-01 DIAGNOSIS — Z86.010 PERSONAL HISTORY OF COLONIC POLYPS: ICD-10-CM

## 2024-04-01 PROCEDURE — 99214 OFFICE O/P EST MOD 30 MIN: CPT | Performed by: INTERNAL MEDICINE

## 2024-04-01 PROCEDURE — G0136 PR ADM OF SOC DTR ASSESS 5-15 M: HCPCS | Performed by: INTERNAL MEDICINE

## 2024-04-01 PROCEDURE — G0439 PPPS, SUBSEQ VISIT: HCPCS | Performed by: INTERNAL MEDICINE

## 2024-04-01 PROCEDURE — 72110 X-RAY EXAM L-2 SPINE 4/>VWS: CPT

## 2024-04-01 NOTE — TELEPHONE ENCOUNTER
Scheduled date of colonoscopy (as of today): 05/07/2024  Physician performing colonoscopy: ALBERT  Location of colonoscopy: [facility]  Bowel prep reviewed with patient: EMIL/CECIL  Instructions reviewed with patient by: KYLAH  Clearances: N/A  : MOLLY [phone]

## 2024-04-01 NOTE — PATIENT INSTRUCTIONS
Medicare Preventive Visit Patient Instructions  Thank you for completing your Welcome to Medicare Visit or Medicare Annual Wellness Visit today. Your next wellness visit will be due in one year (4/2/2025).  The screening/preventive services that you may require over the next 5-10 years are detailed below. Some tests may not apply to you based off risk factors and/or age. Screening tests ordered at today's visit but not completed yet may show as past due. Also, please note that scanned in results may not display below.  Preventive Screenings:  Service Recommendations Previous Testing/Comments   Colorectal Cancer Screening  * Colonoscopy    * Fecal Occult Blood Test (FOBT)/Fecal Immunochemical Test (FIT)  * Fecal DNA/Cologuard Test  * Flexible Sigmoidoscopy Age: 45-75 years old   Colonoscopy: every 10 years (may be performed more frequently if at higher risk)  OR  FOBT/FIT: every 1 year  OR  Cologuard: every 3 years  OR  Sigmoidoscopy: every 5 years  Screening may be recommended earlier than age 45 if at higher risk for colorectal cancer. Also, an individualized decision between you and your healthcare provider will decide whether screening between the ages of 76-85 would be appropriate. Colonoscopy: 02/20/2019  FOBT/FIT: Not on file  Cologuard: Not on file  Sigmoidoscopy: Not on file    Screening Current     Breast Cancer Screening Age: 40+ years old  Frequency: every 1-2 years  Not required if history of left and right mastectomy Mammogram: 06/23/2023    Screening Current   Cervical Cancer Screening Between the ages of 21-29, pap smear recommended once every 3 years.   Between the ages of 30-65, can perform pap smear with HPV co-testing every 5 years.   Recommendations may differ for women with a history of total hysterectomy, cervical cancer, or abnormal pap smears in past. Pap Smear: 06/26/2023    Screening Not Indicated   Hepatitis C Screening Once for adults born between 1945 and 1965  More frequently in  patients at high risk for Hepatitis C Hep C Antibody: 03/29/2021    Screening Current   Diabetes Screening 1-2 times per year if you're at risk for diabetes or have pre-diabetes Fasting glucose: 92 mg/dL (3/15/2024)  A1C: 6.1 % (3/15/2024)  Screening Current   Cholesterol Screening Once every 5 years if you don't have a lipid disorder. May order more often based on risk factors. Lipid panel: 03/15/2024    Screening Not Indicated  History Lipid Disorder     Other Preventive Screenings Covered by Medicare:  Abdominal Aortic Aneurysm (AAA) Screening: covered once if your at risk. You're considered to be at risk if you have a family history of AAA.  Lung Cancer Screening: covers low dose CT scan once per year if you meet all of the following conditions: (1) Age 55-77; (2) No signs or symptoms of lung cancer; (3) Current smoker or have quit smoking within the last 15 years; (4) You have a tobacco smoking history of at least 20 pack years (packs per day multiplied by number of years you smoked); (5) You get a written order from a healthcare provider.  Glaucoma Screening: covered annually if you're considered high risk: (1) You have diabetes OR (2) Family history of glaucoma OR (3)  aged 50 and older OR (4)  American aged 65 and older  Osteoporosis Screening: covered every 2 years if you meet one of the following conditions: (1) You're estrogen deficient and at risk for osteoporosis based off medical history and other findings; (2) Have a vertebral abnormality; (3) On glucocorticoid therapy for more than 3 months; (4) Have primary hyperparathyroidism; (5) On osteoporosis medications and need to assess response to drug therapy.   Last bone density test (DXA Scan): 05/20/2021.  HIV Screening: covered annually if you're between the age of 15-65. Also covered annually if you are younger than 15 and older than 65 with risk factors for HIV infection. For pregnant patients, it is covered up to 3 times per  pregnancy.    Immunizations:  Immunization Recommendations   Influenza Vaccine Annual influenza vaccination during flu season is recommended for all persons aged >= 6 months who do not have contraindications   Pneumococcal Vaccine   * Pneumococcal conjugate vaccine = PCV13 (Prevnar 13), PCV15 (Vaxneuvance), PCV20 (Prevnar 20)  * Pneumococcal polysaccharide vaccine = PPSV23 (Pneumovax) Adults 19-63 yo with certain risk factors or if 65+ yo  If never received any pneumonia vaccine: recommend Prevnar 20 (PCV20)  Give PCV20 if previously received 1 dose of PCV13 or PPSV23   Hepatitis B Vaccine 3 dose series if at intermediate or high risk (ex: diabetes, end stage renal disease, liver disease)   Respiratory syncytial virus (RSV) Vaccine - COVERED BY MEDICARE PART D  * RSVPreF3 (Arexvy) CDC recommends that adults 60 years of age and older may receive a single dose of RSV vaccine using shared clinical decision-making (SCDM)   Tetanus (Td) Vaccine - COST NOT COVERED BY MEDICARE PART B Following completion of primary series, a booster dose should be given every 10 years to maintain immunity against tetanus. Td may also be given as tetanus wound prophylaxis.   Tdap Vaccine - COST NOT COVERED BY MEDICARE PART B Recommended at least once for all adults. For pregnant patients, recommended with each pregnancy.   Shingles Vaccine (Shingrix) - COST NOT COVERED BY MEDICARE PART B  2 shot series recommended in those 19 years and older who have or will have weakened immune systems or those 50 years and older     Health Maintenance Due:      Topic Date Due    Colorectal Cancer Screening  02/20/2024    Breast Cancer Screening: Mammogram  06/23/2024    DXA SCAN  05/20/2031    Hepatitis C Screening  Completed     Immunizations Due:      Topic Date Due    COVID-19 Vaccine (4 - 2023-24 season) 09/01/2023     Advance Directives   What are advance directives?  Advance directives are legal documents that state your wishes and plans for medical  care. These plans are made ahead of time in case you lose your ability to make decisions for yourself. Advance directives can apply to any medical decision, such as the treatments you want, and if you want to donate organs.   What are the types of advance directives?  There are many types of advance directives, and each state has rules about how to use them. You may choose a combination of any of the following:  Living will:  This is a written record of the treatment you want. You can also choose which treatments you do not want, which to limit, and which to stop at a certain time. This includes surgery, medicine, IV fluid, and tube feedings.   Durable power of  for healthcare (DPAHC):  This is a written record that states who you want to make healthcare choices for you when you are unable to make them for yourself. This person, called a proxy, is usually a family member or a friend. You may choose more than 1 proxy.  Do not resuscitate (DNR) order:  A DNR order is used in case your heart stops beating or you stop breathing. It is a request not to have certain forms of treatment, such as CPR. A DNR order may be included in other types of advance directives.  Medical directive:  This covers the care that you want if you are in a coma, near death, or unable to make decisions for yourself. You can list the treatments you want for each condition. Treatment may include pain medicine, surgery, blood transfusions, dialysis, IV or tube feedings, and a ventilator (breathing machine).  Values history:  This document has questions about your views, beliefs, and how you feel and think about life. This information can help others choose the care that you would choose.  Why are advance directives important?  An advance directive helps you control your care. Although spoken wishes may be used, it is better to have your wishes written down. Spoken wishes can be misunderstood, or not followed. Treatments may be given even if  you do not want them. An advance directive may make it easier for your family to make difficult choices about your care.   Weight Management   Why it is important to manage your weight:  Being overweight increases your risk of health conditions such as heart disease, high blood pressure, type 2 diabetes, and certain types of cancer. It can also increase your risk for osteoarthritis, sleep apnea, and other respiratory problems. Aim for a slow, steady weight loss. Even a small amount of weight loss can lower your risk of health problems.  How to lose weight safely:  A safe and healthy way to lose weight is to eat fewer calories and get regular exercise. You can lose up about 1 pound a week by decreasing the number of calories you eat by 500 calories each day.   Healthy meal plan for weight management:  A healthy meal plan includes a variety of foods, contains fewer calories, and helps you stay healthy. A healthy meal plan includes the following:  Eat whole-grain foods more often.  A healthy meal plan should contain fiber. Fiber is the part of grains, fruits, and vegetables that is not broken down by your body. Whole-grain foods are healthy and provide extra fiber in your diet. Some examples of whole-grain foods are whole-wheat breads and pastas, oatmeal, brown rice, and bulgur.  Eat a variety of vegetables every day.  Include dark, leafy greens such as spinach, kale, natalie greens, and mustard greens. Eat yellow and orange vegetables such as carrots, sweet potatoes, and winter squash.   Eat a variety of fruits every day.  Choose fresh or canned fruit (canned in its own juice or light syrup) instead of juice. Fruit juice has very little or no fiber.  Eat low-fat dairy foods.  Drink fat-free (skim) milk or 1% milk. Eat fat-free yogurt and low-fat cottage cheese. Try low-fat cheeses such as mozzarella and other reduced-fat cheeses.  Choose meat and other protein foods that are low in fat.  Choose beans or other legumes  such as split peas or lentils. Choose fish, skinless poultry (chicken or turkey), or lean cuts of red meat (beef or pork). Before you cook meat or poultry, cut off any visible fat.   Use less fat and oil.  Try baking foods instead of frying them. Add less fat, such as margarine, sour cream, regular salad dressing and mayonnaise to foods. Eat fewer high-fat foods. Some examples of high-fat foods include french fries, doughnuts, ice cream, and cakes.  Eat fewer sweets.  Limit foods and drinks that are high in sugar. This includes candy, cookies, regular soda, and sweetened drinks.  Exercise:  Exercise at least 30 minutes per day on most days of the week. Some examples of exercise include walking, biking, dancing, and swimming. You can also fit in more physical activity by taking the stairs instead of the elevator or parking farther away from stores. Ask your healthcare provider about the best exercise plan for you.    © Copyright [employer] 2018 Information is for End User's use only and may not be sold, redistributed or otherwise used for commercial purposes. All illustrations and images included in CareNotes® are the copyrighted property of [employer]M., Inc. or [employer]

## 2024-04-01 NOTE — PROGRESS NOTES
Assessment and Plan:     1. Prediabetes    Stable. Monitor carbohydrate intake. Increase cardiovascular exercise. Add on strength training. Get enough fiber in diet.    - Hemoglobin A1C; Future    2. Hypercholesterolemia    Cholesterol is well controlled. Continue statin.    - Lipid Panel with Direct LDL reflex; Future    3. Personal history of colonic polyps    Due for surveillance colonoscopy.    - Ambulatory referral to Gastroenterology; Future    4. Stage 3a chronic kidney disease (HCC)        Lab Units 03/15/24  0947 08/14/23  0943 02/20/23  1114   CREATININE mg/dL 0.99 1.15 0.92   EGFR ml/min/1.73sq m 58 49 64     Discussed CKD with patient. Blood pressure is well controlled. No underlying diabetes, but does have pre-diabetes. Stay well hydrated and avoid nephrotoxins. Monitor labs. Check US kidney and bladder.    - US kidney and bladder; Future  - CBC; Future  - Basic metabolic panel; Future  - Vitamin D 25 hydroxy; Future  - PTH, intact; Future  - Phosphorus; Future  - Urinalysis with microscopic; Future    5. Chronic bilateral low back pain without sciatica    Likely musculoskeletal back pain and no red flag symptoms. Check lumbar x-ray. Recommend back and core strengthening exercises.    - XR spine lumbar minimum 4 views non injury; Future    6. Medicare annual wellness visit, subsequent        Depression Screening and Follow-up Plan: Patient was screened for depression during today's encounter. They screened negative with a PHQ-2 score of 0.    Preventive health issues were discussed with patient, and age appropriate screening tests were ordered as noted in patient's After Visit Summary.  Personalized health advice and appropriate referrals for health education or preventive services given if needed, as noted in patient's After Visit Summary.     History of Present Illness:     History of Present Illness  The patient presents for follow-up and medicare wellness visit.    The patient reports experiencing  severe, forceful back pain, which has been ongoing for over 6 months. The pain intensifies during nocturnal hours, particularly after urination and ambulation. Despite the pain, she does not experience stiffness upon waking. The pain occasionally subsides as the day progresses, but recurs with prolonged periods of sitting. The pain does not radiate to her legs. She has not sought physical therapy or chiropractic treatment for her back pain. She engages in daily back exercises, stretching, and strengthening exercises. She manages her pain with over-the-counter medications such as Aleve, ibuprofen, or Tylenol.       Patient Care Team:  John Martinez DO as PCP - General (Internal Medicine)  Ky Keenan MD as Endoscopist     Review of Systems:     Review of Systems   Constitutional:  Negative for activity change, appetite change and fatigue.   Respiratory:  Negative for apnea, cough, chest tightness, shortness of breath and wheezing.    Cardiovascular:  Negative for chest pain, palpitations and leg swelling.   Gastrointestinal:  Negative for abdominal distention, abdominal pain, blood in stool, constipation, diarrhea, nausea and vomiting.   Musculoskeletal:  Positive for back pain. Negative for arthralgias, gait problem, joint swelling and myalgias.   Neurological:  Positive for headaches (intermittent). Negative for dizziness, weakness, light-headedness and numbness.   Psychiatric/Behavioral:  Negative for behavioral problems, confusion, hallucinations, sleep disturbance and suicidal ideas. The patient is not nervous/anxious.        Social History     Occupational History    Not on file   Tobacco Use    Smoking status: Never    Smokeless tobacco: Never   Vaping Use    Vaping status: Never Used   Substance and Sexual Activity    Alcohol use: No    Drug use: No    Sexual activity: Yes     Partners: Male     Birth control/protection: Surgical     Social Determinants of Health (SDOH)      Flowsheet Row Most  Recent Value   Housing Stability    In the last 12 months, was there a time when you were not able to pay the mortgage or rent on time? Yes   In the last 12 months, how many places have you lived? 1   In the last 12 months, was there a time when you did not have a steady place to sleep or slept in a shelter (including now)? No   Transportation Needs    In the past 12 months, has lack of transportation kept you from medical appointments or from getting medications? No   In the past 12 months, has lack of transportation kept you from meetings, work, or from getting things needed for daily living? No   Food Insecurity    Within the past 12 months, you worried that your food would run out before you got the money to buy more. Sometimes   Within the past 12 months, the food you bought just didn't last and you didn't have money to get more. Sometimes   Utilities    In the past 12 months has the electric, gas, oil, or water company threatened to shut off services in your home? No      Medicare Screening Tests and Risk Assessments:     Renetta is here for her Subsequent Wellness visit. Last Medicare Wellness visit information reviewed, patient interviewed and updates made to the record today.      Health Risk Assessment:   Patient rates overall health as good. Patient feels that their physical health rating is same. Patient is satisfied with their life. Eyesight was rated as slightly worse. Hearing was rated as same. Patient feels that their emotional and mental health rating is same. Patients states they are sometimes angry. Patient states they are sometimes unusually tired/fatigued. Pain experienced in the last 7 days has been none. Patient states that she has experienced no weight loss or gain in last 6 months.     Depression Screening:   PHQ-2 Score: 0      Fall Risk Screening:   In the past year, patient has experienced: no history of falling in past year      Urinary Incontinence Screening:   Patient has not leaked  urine accidently in the last six months.     Home Safety:  Patient does not have trouble with stairs inside or outside of their home. Patient has working smoke alarms and has working carbon monoxide detector. Home safety hazards include: none.     Nutrition:   Current diet is Regular.     Medications:   Patient is currently taking over-the-counter supplements. OTC medications include: see medication list. Patient is able to manage medications.     Activities of Daily Living (ADLs)/Instrumental Activities of Daily Living (IADLs):   Walk and transfer into and out of bed and chair?: Yes  Dress and groom yourself?: Yes    Bathe or shower yourself?: Yes    Feed yourself? Yes  Do your laundry/housekeeping?: Yes  Manage your money, pay your bills and track your expenses?: Yes  Make your own meals?: Yes    Do your own shopping?: Yes    Previous Hospitalizations:   Any hospitalizations or ED visits within the last 12 months?: No      Advance Care Planning:   Living will: No    Durable POA for healthcare: No    Advanced directive: No    ACP document given: Yes      Cognitive Screening:   Provider or family/friend/caregiver concerned regarding cognition?: No    PREVENTIVE SCREENINGS      Cardiovascular Screening:    General: Screening Not Indicated and History Lipid Disorder      Diabetes Screening:     General: Screening Current      Colorectal Cancer Screening:     General: Screening Current    Due for: Colonoscopy - High Risk      Breast Cancer Screening:     General: Screening Current      Cervical Cancer Screening:    General: Screening Not Indicated      Osteoporosis Screening:    General: Screening Current      Abdominal Aortic Aneurysm (AAA) Screening:        General: Screening Not Indicated      Lung Cancer Screening:     General: Screening Not Indicated      Hepatitis C Screening:    General: Screening Current    Screening, Brief Intervention, and Referral to Treatment (SBIRT)    Screening  Typical number of drinks  "in a day: 0  Typical number of drinks in a week: 0  Interpretation: Low risk drinking behavior.    AUDIT-C Screenin) How often did you have a drink containing alcohol in the past year? never  2) How many drinks did you have on a typical day when you were drinking in the past year? 0  3) How often did you have 6 or more drinks on one occasion in the past year? never    AUDIT-C Score: 0  Interpretation: Score 0-2 (female): Negative screen for alcohol misuse    Single Item Drug Screening:  How often have you used an illegal drug (including marijuana) or a prescription medication for non-medical reasons in the past year? never    Single Item Drug Screen Score: 0  Interpretation: Negative screen for possible drug use disorder    Brief Intervention  Alcohol & drug use screenings were reviewed. No concerns regarding substance use disorder identified.     SDOH Risk Assessment  Social determinants of health (SDOH) risk assesment tool was completed. The tool at a minimum covered housing stability, food insecurity, transportation needs, and utility difficulty. Patient had at risk responses for the following SDOH domains: food insecurity and housing stability.     Time spent regarding SDOH risk assessment was 5 minutes. The patient has unmet SDOH needs that are interfering with the diagnosis or treatment of their illness.    Other Counseling Topics:   Car/seat belt/driving safety, skin self-exam, sunscreen and regular weightbearing exercise.      Physical Exam:     /72 (BP Location: Left arm, Patient Position: Sitting, Cuff Size: Standard)   Pulse 73   Temp (!) 97.3 °F (36.3 °C) (Tympanic)   Resp 17   Ht 5' 1\" (1.549 m)   Wt 71.4 kg (157 lb 6.4 oz)   SpO2 96%   BMI 29.74 kg/m²     Physical Exam  Constitutional:       General: She is not in acute distress.     Appearance: She is not ill-appearing.   HENT:      Head: Normocephalic and atraumatic.   Cardiovascular:      Rate and Rhythm: Normal rate and regular " rhythm.      Heart sounds: No murmur heard.  Pulmonary:      Effort: Pulmonary effort is normal. No respiratory distress.      Breath sounds: No wheezing.   Abdominal:      General: Bowel sounds are normal. There is no distension.      Tenderness: There is no abdominal tenderness.   Musculoskeletal:      Right lower leg: No edema.      Left lower leg: No edema.   Neurological:      General: No focal deficit present.      Mental Status: She is alert. Mental status is at baseline.      Motor: No weakness.      Gait: Gait normal.   Psychiatric:         Mood and Affect: Mood normal.         Behavior: Behavior normal.        John Holcombbernardo,

## 2024-04-02 DIAGNOSIS — M47.816 ARTHROPATHY OF LUMBAR FACET JOINT: ICD-10-CM

## 2024-04-02 DIAGNOSIS — M54.50 CHRONIC BILATERAL LOW BACK PAIN WITHOUT SCIATICA: Primary | ICD-10-CM

## 2024-04-02 DIAGNOSIS — G89.29 CHRONIC BILATERAL LOW BACK PAIN WITHOUT SCIATICA: Primary | ICD-10-CM

## 2024-04-15 ENCOUNTER — HOSPITAL ENCOUNTER (OUTPATIENT)
Dept: ULTRASOUND IMAGING | Facility: CLINIC | Age: 69
Discharge: HOME | End: 2024-04-15
Payer: COMMERCIAL

## 2024-04-15 ENCOUNTER — TELEPHONE (OUTPATIENT)
Age: 69
End: 2024-04-15

## 2024-04-15 DIAGNOSIS — N18.31 STAGE 3A CHRONIC KIDNEY DISEASE (HCC): ICD-10-CM

## 2024-04-15 PROCEDURE — 76775 US EXAM ABDO BACK WALL LIM: CPT

## 2024-04-22 ENCOUNTER — TELEPHONE (OUTPATIENT)
Age: 69
End: 2024-04-22

## 2024-04-22 NOTE — TELEPHONE ENCOUNTER
----- Message from [facility] sent at 4/22/2024  6:44 AM EDT -----  US of the kidney and bladder is normal

## 2024-05-06 ENCOUNTER — TELEPHONE (OUTPATIENT)
Age: 69
End: 2024-05-06

## 2024-05-06 NOTE — TELEPHONE ENCOUNTER
Patient contacted office requesting miralax/dulcolax procedure prep directions. Directions sent to [email or web address] as per patient request. Procedure directions also sent via [employer].

## 2024-05-07 ENCOUNTER — PREP FOR PROCEDURE (OUTPATIENT)
Age: 69
End: 2024-05-07

## 2024-05-07 ENCOUNTER — HOSPITAL ENCOUNTER (OUTPATIENT)
Dept: GASTROENTEROLOGY | Facility: HOSPITAL | Age: 69
Setting detail: OUTPATIENT SURGERY
Discharge: HOME | End: 2024-05-07
Attending: INTERNAL MEDICINE
Payer: COMMERCIAL

## 2024-05-07 ENCOUNTER — ANESTHESIA EVENT (OUTPATIENT)
Dept: GASTROENTEROLOGY | Facility: HOSPITAL | Age: 69
End: 2024-05-07

## 2024-05-07 ENCOUNTER — ANESTHESIA (OUTPATIENT)
Dept: GASTROENTEROLOGY | Facility: HOSPITAL | Age: 69
End: 2024-05-07

## 2024-05-07 VITALS
HEIGHT: 61 IN | RESPIRATION RATE: 14 BRPM | HEART RATE: 77 BPM | OXYGEN SATURATION: 99 % | BODY MASS INDEX: 29.51 KG/M2 | TEMPERATURE: 97.8 F | WEIGHT: 156.31 LBS | SYSTOLIC BLOOD PRESSURE: 110 MMHG | DIASTOLIC BLOOD PRESSURE: 76 MMHG

## 2024-05-07 DIAGNOSIS — Z86.010 PERSONAL HISTORY OF COLONIC POLYPS: ICD-10-CM

## 2024-05-07 PROCEDURE — 45385 COLONOSCOPY W/LESION REMOVAL: CPT | Performed by: INTERNAL MEDICINE

## 2024-05-07 PROCEDURE — 88305 TISSUE EXAM BY PATHOLOGIST: CPT | Performed by: STUDENT IN AN ORGANIZED HEALTH CARE EDUCATION/TRAINING PROGRAM

## 2024-05-07 RX ORDER — PROPOFOL 10 MG/ML
INJECTION, EMULSION INTRAVENOUS AS NEEDED
Status: DISCONTINUED | OUTPATIENT
Start: 2024-05-07 | End: 2024-05-07

## 2024-05-07 RX ORDER — LIDOCAINE HYDROCHLORIDE 20 MG/ML
INJECTION, SOLUTION EPIDURAL; INFILTRATION; INTRACAUDAL; PERINEURAL AS NEEDED
Status: DISCONTINUED | OUTPATIENT
Start: 2024-05-07 | End: 2024-05-07

## 2024-05-07 RX ORDER — SODIUM CHLORIDE, SODIUM LACTATE, POTASSIUM CHLORIDE, CALCIUM CHLORIDE 600; 310; 30; 20 MG/100ML; MG/100ML; MG/100ML; MG/100ML
INJECTION, SOLUTION INTRAVENOUS CONTINUOUS PRN
Status: DISCONTINUED | OUTPATIENT
Start: 2024-05-07 | End: 2024-05-07

## 2024-05-07 RX ADMIN — LIDOCAINE HYDROCHLORIDE 50 MG: 20 INJECTION, SOLUTION EPIDURAL; INFILTRATION; INTRACAUDAL; PERINEURAL at 07:59

## 2024-05-07 RX ADMIN — SODIUM CHLORIDE, SODIUM LACTATE, POTASSIUM CHLORIDE, AND CALCIUM CHLORIDE: .6; .31; .03; .02 INJECTION, SOLUTION INTRAVENOUS at 07:53

## 2024-05-07 RX ADMIN — PROPOFOL 100 MG: 10 INJECTION, EMULSION INTRAVENOUS at 07:59

## 2024-05-07 RX ADMIN — PROPOFOL 100 MG: 10 INJECTION, EMULSION INTRAVENOUS at 08:09

## 2024-05-07 NOTE — ANESTHESIA PREPROCEDURE EVALUATION
Procedure:  COLONOSCOPY    Relevant Problems   CARDIO   (+) Hypercholesterolemia      /RENAL   (+) Stage 3a chronic kidney disease (HCC)      MUSCULOSKELETAL   (+) Primary osteoarthritis of both first carpometacarpal joints      PULMONARY   (+) MARGARITA (obstructive sleep apnea)      IMPRESSIONS:  Normal study after maximal exercise. Left ventricular systolic function was normal.        Physical Exam    Airway    Mallampati score: II  TM Distance: >3 FB  Neck ROM: full     Dental       Cardiovascular  Cardiovascular exam normal    Pulmonary  Pulmonary exam normal     Other Findings  post-pubertal.    Postmenopausal atrophic vaginitis    Uterine prolapse    Ovarian cyst    Sleep apnea, obstructive    Cervical high risk HPV (human papillomavirus) test positive    Colon polyp    Hyperlipidemia        Anesthesia Plan  ASA Score- 2     Anesthesia Type- IV sedation with anesthesia with ASA Monitors.         Additional Monitors:     Airway Plan:            Plan Factors-Exercise tolerance (METS): >4 METS.    Chart reviewed. EKG reviewed. Imaging results reviewed. Existing labs reviewed. Patient summary reviewed.                  Induction- intravenous.    Postoperative Plan-     Informed Consent- Anesthetic plan and risks discussed with patient.  I personally reviewed this patient with the CRNA. Discussed and agreed on the Anesthesia Plan with the CRNA..

## 2024-05-07 NOTE — H&P
History and Physical -  [facility]  Renetta Barrett 69 y.o. female MRN: [id number]                  HPI: Renetta Barrett is a 69 y.o. year old female who presents for colonoscopy for history of colon polyps.  Last colonoscopy 5 years ago      REVIEW OF SYSTEMS: Per the HPI, and otherwise unremarkable.    Historical Information   Past Medical History:   Diagnosis Date    Cervical high risk HPV (human papillomavirus) test positive 8/10/2017    Ovarian cyst     Postmenopausal atrophic vaginitis     Sleep apnea, obstructive     Uterine prolapse      Past Surgical History:   Procedure Laterality Date    BREAST BIOPSY Left     benign- does not recall when    COLONOSCOPY  2014    HYSTERECTOMY  1986    PARTIAL HYSTERECTOMY      supracervical    MI COLONOSCOPY FLX DX W/COLLJ SPEC WHEN PFRMD N/A 2/20/2019    Procedure: COLONOSCOPY;  Surgeon: Ky Keenan MD;  Location: [facility];  Service: Gastroenterology     Social History   Social History     Substance and Sexual Activity   Alcohol Use No     Social History     Substance and Sexual Activity   Drug Use No     Social History     Tobacco Use   Smoking Status Never   Smokeless Tobacco Never     Family History   Problem Relation Age of Onset    No Known Problems Mother     No Known Problems Father     Breast cancer Sister 53    No Known Problems Sister     No Known Problems Maternal Grandmother     No Known Problems Paternal Grandmother     No Known Problems Maternal Aunt     No Known Problems Maternal Aunt     No Known Problems Maternal Aunt     No Known Problems Paternal Aunt     No Known Problems Paternal Aunt     Diabetes Other     Hypertension Other     Diabetes Family     Hypertension Family     Colon cancer Neg Hx     Ovarian cancer Neg Hx     Uterine cancer Neg Hx     Cervical cancer Neg Hx        Meds/Allergies     (Not in a hospital admission)      No Known Allergies    Objective     not currently breastfeeding.      PHYSICAL EXAM    Gen: NAD  CV:  RRR  CHEST: Clear  ABD: soft, NT/ND  EXT: no edema  Neuro: AAO      ASSESSMENT/PLAN:  This is a 69 y.o. year old female here for history of colon polyps    PLAN:   Procedure: Colonoscopy

## 2024-05-07 NOTE — ANESTHESIA POSTPROCEDURE EVALUATION
Post-Op Assessment Note    CV Status:  Stable  Pain Score: 0    Pain management: adequate       Mental Status:  Sleepy   Hydration Status:  Stable   PONV Controlled:  None   Airway Patency:  Patent     Post Op Vitals Reviewed: Yes    No anethesia notable event occurred.    Staff: CRNA               BP   110/68   Temp   97.8   Pulse  67   Resp   16   SpO2   98

## 2024-05-07 NOTE — INTERVAL H&P NOTE
H&P reviewed. After examining the patient I find no changes in the patients condition since the H&P had been written.    Vitals:    05/07/24 0701   BP: 128/70   Pulse: 90   Resp: 16   Temp: (!) 97.4 °F (36.3 °C)   SpO2: 99%

## 2024-05-09 PROCEDURE — 88305 TISSUE EXAM BY PATHOLOGIST: CPT | Performed by: STUDENT IN AN ORGANIZED HEALTH CARE EDUCATION/TRAINING PROGRAM

## 2024-06-21 DIAGNOSIS — E78.00 HYPERCHOLESTEROLEMIA: Chronic | ICD-10-CM

## 2024-06-21 RX ORDER — ATORVASTATIN CALCIUM 20 MG/1
TABLET, FILM COATED ORAL
Qty: 90 TABLET | Refills: 1 | Status: SHIPPED | OUTPATIENT
Start: 2024-06-21

## 2024-06-24 ENCOUNTER — HOSPITAL ENCOUNTER (OUTPATIENT)
Age: 69
Discharge: HOME | End: 2024-06-24
Payer: COMMERCIAL

## 2024-06-24 DIAGNOSIS — Z12.31 ENCOUNTER FOR SCREENING MAMMOGRAM FOR MALIGNANT NEOPLASM OF BREAST: ICD-10-CM

## 2024-06-24 PROCEDURE — 77063 BREAST TOMOSYNTHESIS BI: CPT

## 2024-06-24 PROCEDURE — 77067 SCR MAMMO BI INCL CAD: CPT

## 2024-06-25 ENCOUNTER — TELEPHONE (OUTPATIENT)
Age: 69
End: 2024-06-25

## 2024-06-25 NOTE — TELEPHONE ENCOUNTER
----- Message from John HolcombDO bernardo sent at 6/25/2024  9:20 AM EDT -----  Let patient know mammogram was normal

## 2024-06-27 ENCOUNTER — ANNUAL EXAM (OUTPATIENT)
Age: 69
End: 2024-06-27
Payer: COMMERCIAL

## 2024-06-27 VITALS
BODY MASS INDEX: 29.07 KG/M2 | WEIGHT: 154 LBS | HEIGHT: 61 IN | SYSTOLIC BLOOD PRESSURE: 110 MMHG | DIASTOLIC BLOOD PRESSURE: 60 MMHG

## 2024-06-27 DIAGNOSIS — R87.810 CERVICAL HIGH RISK HPV (HUMAN PAPILLOMAVIRUS) TEST POSITIVE: ICD-10-CM

## 2024-06-27 DIAGNOSIS — Z12.31 SCREENING MAMMOGRAM, ENCOUNTER FOR: ICD-10-CM

## 2024-06-27 DIAGNOSIS — Z01.419 ENCOUNTER FOR ANNUAL ROUTINE GYNECOLOGICAL EXAMINATION: Primary | ICD-10-CM

## 2024-06-27 DIAGNOSIS — Z78.0 ASYMPTOMATIC POSTMENOPAUSAL STATUS: ICD-10-CM

## 2024-06-27 PROCEDURE — G0476 HPV COMBO ASSAY CA SCREEN: HCPCS | Performed by: NURSE PRACTITIONER

## 2024-06-27 PROCEDURE — G0101 CA SCREEN;PELVIC/BREAST EXAM: HCPCS | Performed by: NURSE PRACTITIONER

## 2024-06-27 PROCEDURE — G0145 SCR C/V CYTO,THINLAYER,RESCR: HCPCS | Performed by: NURSE PRACTITIONER

## 2024-06-27 RX ORDER — ERYTHROMYCIN 5 MG/G
OINTMENT OPHTHALMIC
COMMUNITY
Start: 2024-06-24

## 2024-06-27 NOTE — PROGRESS NOTES
Diagnoses and all orders for this visit:    Encounter for annual routine gynecological examination  -     Liquid-based pap, screening    Asymptomatic postmenopausal status  -     DXA bone density spine hip and pelvis; Future    Cervical high risk HPV (human papillomavirus) test positive  -     Liquid-based pap, screening    Screening mammogram, encounter for  -     Mammo screening bilateral w 3d & cad; Future    Other orders  -     Information given for Dr Elena per pt request    Call as needed, encouraged calcium/vit D in her diet, call with any PMB, all questions answered          Pleasant 69 y.o. postmenopausal female here for annual exam. She denies postmenopausal bleeding. +history of HPV+ 2018, 2019 and 2020, last colpo 2018 nml, pap/hpv on 06/27/2024 neg/neg. A pap with cotest was repeated per ASCCP guidelines. She denies vaginal issues. Denies pelvic pain except for an occasional LLQ pain that she has had all her life. Pelvic u/s nml 11/2017. Denies postmenopausal issues. She is NOT Sexually active for years. Colonoscopy due 2024. DXA ordered. Mammogram 06/24/2024 BR2 normal    Past Medical History:   Diagnosis Date    Cervical high risk HPV (human papillomavirus) test positive 08/10/2017    Colon polyp     Hyperlipidemia     Ovarian cyst     Postmenopausal atrophic vaginitis     Sleep apnea, obstructive     Uterine prolapse      Past Surgical History:   Procedure Laterality Date    BREAST BIOPSY Left     benign- does not recall when    COLONOSCOPY  2014    HYSTERECTOMY  1986    PARTIAL HYSTERECTOMY      supracervical    ND COLONOSCOPY FLX DX W/COLLJ SPEC WHEN PFRMD N/A 2/20/2019    Procedure: COLONOSCOPY;  Surgeon: Ky Keenan MD;  Location: [facility];  Service: Gastroenterology     Family History   Problem Relation Age of Onset    No Known Problems Mother     No Known Problems Father     Breast cancer Sister 53        Passed away    No Known Problems Sister     No Known Problems Maternal  "Grandmother     No Known Problems Paternal Grandmother     No Known Problems Maternal Aunt     No Known Problems Maternal Aunt     No Known Problems Maternal Aunt     No Known Problems Paternal Aunt     No Known Problems Paternal Aunt     Diabetes Other     Hypertension Other     Diabetes Family     Hypertension Family     Colon cancer Neg Hx     Ovarian cancer Neg Hx     Uterine cancer Neg Hx     Cervical cancer Neg Hx      Social History     Tobacco Use    Smoking status: Never    Smokeless tobacco: Never   Vaping Use    Vaping status: Never Used   Substance Use Topics    Alcohol use: No    Drug use: No       Current Outpatient Medications:     atorvastatin (LIPITOR) 20 mg tablet, TAKE 1 TABLET BY MOUTH EVERY DAY WITH DINNER, Disp: 90 tablet, Rfl: 1    cholecalciferol (VITAMIN D3) 1,000 units tablet, Take 1,000 Units by mouth daily, Disp: , Rfl:     erythromycin (ILOTYCIN) ophthalmic ointment, APPLY TO INCISIONS TWICE A DAY., Disp: , Rfl:   Patient Active Problem List    Diagnosis Date Noted    Stage 3a chronic kidney disease (HCC) 2024    Cataracts, bilateral 2023    CMC arthritis 2023    Bilateral carpal tunnel syndrome 06/15/2023    Primary osteoarthritis of both first carpometacarpal joints 06/15/2023    MARGARITA (obstructive sleep apnea)     Hypercholesterolemia 02/15/2018    Prediabetes 02/15/2018       No Known Allergies    OB History    Para Term  AB Living   1 1 1     1   SAB IAB Ectopic Multiple Live Births           1      # Outcome Date GA Lbr Piero/2nd Weight Sex Type Anes PTL Lv   1 Term              Son has 4 grandchildren, oldest is 30 and youngest is 17, 3 great grands  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    Review of Systems   Constitutional: Negative for chills, fatigue, fever and unexpected weight " change.   Respiratory: Negative for shortness of breath.    Gastrointestinal: Negative for anal bleeding, blood in stool, constipation and diarrhea.   Genitourinary: Negative for difficulty urinating, dysuria and hematuria.     Physical Exam   Constitutional: She appears well-developed and well-nourished. No distress.   HENT:   Head: Normocephalic.   Neck: Normal range of motion. Neck supple.   Pulmonary: Effort normal.  Breasts: bilateral without masses, skin changes or nipple discharge. Bilaterally soft and warm to touch. No areas of erythema or pain.  Abdominal: Soft.   Pelvic exam was performed with patient supine. No labial fusion. There is no rash, tenderness, lesion or injury on the right labia. There is no rash, tenderness, lesion or injury on the left labia. Uterus is surgically absent. Cervix exhibits no motion tenderness, no discharge and no friability. Right adnexum displays no mass, no tenderness and no fullness. Left adnexum displays no mass, no tenderness and no fullness. No erythema or tenderness in the vagina. No foreign body in the vagina. No signs of injury around the vagina. No vaginal discharge found.   Lymphadenopathy:        Right: No inguinal adenopathy present.        Left: No inguinal adenopathy present.

## 2024-07-03 LAB
LAB AP GYN PRIMARY INTERPRETATION: NORMAL
Lab: NORMAL

## 2024-09-20 ENCOUNTER — APPOINTMENT (OUTPATIENT)
Age: 69
End: 2024-09-20
Payer: COMMERCIAL

## 2024-09-20 DIAGNOSIS — R73.03 PREDIABETES: Chronic | ICD-10-CM

## 2024-09-20 DIAGNOSIS — E78.00 HYPERCHOLESTEROLEMIA: Chronic | ICD-10-CM

## 2024-09-20 DIAGNOSIS — N18.31 STAGE 3A CHRONIC KIDNEY DISEASE (HCC): ICD-10-CM

## 2024-09-20 LAB
25(OH)D3 SERPL-MCNC: 37.7 NG/ML (ref 30–100)
ANION GAP SERPL CALCULATED.3IONS-SCNC: 7 MMOL/L (ref 4–13)
BACTERIA UR QL AUTO: NORMAL /HPF
BILIRUB UR QL STRIP: NEGATIVE
BUN SERPL-MCNC: 16 MG/DL (ref 5–25)
CALCIUM SERPL-MCNC: 9.2 MG/DL (ref 8.4–10.2)
CHLORIDE SERPL-SCNC: 107 MMOL/L (ref 96–108)
CHOLEST SERPL-MCNC: 168 MG/DL
CLARITY UR: CLEAR
CO2 SERPL-SCNC: 28 MMOL/L (ref 21–32)
COLOR UR: COLORLESS
CREAT SERPL-MCNC: 1.02 MG/DL (ref 0.6–1.3)
ERYTHROCYTE [DISTWIDTH] IN BLOOD BY AUTOMATED COUNT: 13.1 % (ref 11.6–15.1)
EST. AVERAGE GLUCOSE BLD GHB EST-MCNC: 123 MG/DL
GFR SERPL CREATININE-BSD FRML MDRD: 56 ML/MIN/1.73SQ M
GLUCOSE P FAST SERPL-MCNC: 90 MG/DL (ref 65–99)
GLUCOSE UR STRIP-MCNC: NEGATIVE MG/DL
HBA1C MFR BLD: 5.9 %
HCT VFR BLD AUTO: 40 % (ref 34.8–46.1)
HDLC SERPL-MCNC: 60 MG/DL
HGB BLD-MCNC: 12.9 G/DL (ref 11.5–15.4)
HGB UR QL STRIP.AUTO: NEGATIVE
KETONES UR STRIP-MCNC: NEGATIVE MG/DL
LDLC SERPL CALC-MCNC: 93 MG/DL (ref 0–100)
LEUKOCYTE ESTERASE UR QL STRIP: NEGATIVE
MCH RBC QN AUTO: 30.3 PG (ref 26.8–34.3)
MCHC RBC AUTO-ENTMCNC: 32.3 G/DL (ref 31.4–37.4)
MCV RBC AUTO: 94 FL (ref 82–98)
NITRITE UR QL STRIP: NEGATIVE
NON-SQ EPI CELLS URNS QL MICRO: NORMAL /HPF
PH UR STRIP.AUTO: 6 [PH]
PHOSPHATE SERPL-MCNC: 3.3 MG/DL (ref 2.3–4.1)
PLATELET # BLD AUTO: 341 THOUSANDS/UL (ref 149–390)
PMV BLD AUTO: 10.2 FL (ref 8.9–12.7)
POTASSIUM SERPL-SCNC: 4.3 MMOL/L (ref 3.5–5.3)
PROT UR STRIP-MCNC: NEGATIVE MG/DL
PTH-INTACT SERPL-MCNC: 78.8 PG/ML (ref 12–88)
RBC # BLD AUTO: 4.26 MILLION/UL (ref 3.81–5.12)
RBC #/AREA URNS AUTO: NORMAL /HPF
SODIUM SERPL-SCNC: 142 MMOL/L (ref 135–147)
SP GR UR STRIP.AUTO: 1.02 (ref 1–1.03)
TRIGL SERPL-MCNC: 77 MG/DL
UROBILINOGEN UR STRIP-ACNC: <2 MG/DL
WBC # BLD AUTO: 4.69 THOUSAND/UL (ref 4.31–10.16)
WBC #/AREA URNS AUTO: NORMAL /HPF

## 2024-09-20 PROCEDURE — 83970 ASSAY OF PARATHORMONE: CPT

## 2024-09-20 PROCEDURE — 85027 COMPLETE CBC AUTOMATED: CPT

## 2024-09-20 PROCEDURE — 84100 ASSAY OF PHOSPHORUS: CPT

## 2024-09-20 PROCEDURE — 82306 VITAMIN D 25 HYDROXY: CPT

## 2024-09-20 PROCEDURE — 83036 HEMOGLOBIN GLYCOSYLATED A1C: CPT

## 2024-09-20 PROCEDURE — 80061 LIPID PANEL: CPT

## 2024-09-20 PROCEDURE — 80048 BASIC METABOLIC PNL TOTAL CA: CPT

## 2024-09-20 PROCEDURE — 36415 COLL VENOUS BLD VENIPUNCTURE: CPT

## 2024-09-20 PROCEDURE — 81001 URINALYSIS AUTO W/SCOPE: CPT

## 2024-09-27 DIAGNOSIS — E78.00 HYPERCHOLESTEROLEMIA: Chronic | ICD-10-CM

## 2024-09-27 RX ORDER — ATORVASTATIN CALCIUM 20 MG/1
20 TABLET, FILM COATED ORAL
Qty: 90 TABLET | Refills: 1 | Status: SHIPPED | OUTPATIENT
Start: 2024-09-27

## 2024-10-07 ENCOUNTER — OFFICE VISIT (OUTPATIENT)
Age: 69
End: 2024-10-07
Payer: COMMERCIAL

## 2024-10-07 VITALS
DIASTOLIC BLOOD PRESSURE: 72 MMHG | RESPIRATION RATE: 18 BRPM | BODY MASS INDEX: 29.79 KG/M2 | WEIGHT: 157.8 LBS | TEMPERATURE: 98 F | OXYGEN SATURATION: 98 % | HEIGHT: 61 IN | SYSTOLIC BLOOD PRESSURE: 122 MMHG | HEART RATE: 80 BPM

## 2024-10-07 DIAGNOSIS — R73.03 PREDIABETES: Chronic | ICD-10-CM

## 2024-10-07 DIAGNOSIS — E78.00 HYPERCHOLESTEROLEMIA: Chronic | ICD-10-CM

## 2024-10-07 DIAGNOSIS — N18.31 STAGE 3A CHRONIC KIDNEY DISEASE (HCC): Primary | ICD-10-CM

## 2024-10-07 PROBLEM — G56.03 BILATERAL CARPAL TUNNEL SYNDROME: Chronic | Status: RESOLVED | Noted: 2023-06-15 | Resolved: 2024-10-07

## 2024-10-07 PROCEDURE — 99214 OFFICE O/P EST MOD 30 MIN: CPT | Performed by: INTERNAL MEDICINE

## 2024-10-07 PROCEDURE — G2211 COMPLEX E/M VISIT ADD ON: HCPCS | Performed by: INTERNAL MEDICINE

## 2024-10-07 NOTE — PATIENT INSTRUCTIONS
"Patient Education     High cholesterol   The Basics   Written by the doctors and editors at [facility]   What is cholesterol? -- Cholesterol is a substance found in blood. Everyone has some. It is needed for good health. But people sometimes have too much cholesterol.  Compared with people with normal cholesterol, people with high cholesterol have a higher risk of heart attack, stroke, and other health problems. The higher your cholesterol, the higher your risk of these problems.  Are there different types of cholesterol? -- Yes, there are a few different types. If you get a cholesterol test, you might hear your doctor or nurse talk about:   Total cholesterol   LDL cholesterol - Some people call this the \"bad\" cholesterol. That's because having high LDL levels raises your risk of heart attack, stroke, and other health problems.   HDL cholesterol - Some people call this the \"good\" cholesterol. That's because people with high HDL levels tend to have a lower risk of heart attack, stroke, and other health problems.   Non-HDL cholesterol - Non-HDL cholesterol is your total cholesterol minus your HDL cholesterol.   Triglycerides - Triglycerides are not cholesterol. They are another type of fat. But they often get measured when cholesterol is measured. (Having high triglycerides also seems to increase the risk of heart attack and stroke.)  What should my numbers be? -- Ask your doctor or nurse what your numbers should be. Different people need different goals. If you live outside of the [place], see the table (table 1).  In general, people who do not already have heart disease should aim for:   Total cholesterol below 200   LDL cholesterol below 130, or much lower if they are at risk of heart attack or stroke   HDL cholesterol above 60   Non-HDL cholesterol below 160, or lower if they are at risk of heart attack or stroke   Triglycerides below 150  Remember, though, that many people who cannot meet these goals still have a low " "risk of heart attack and stroke.  What should I do if I have high cholesterol? -- Ask your doctor what your overall risk of heart attack and stroke is. Just having high cholesterol is not always a reason to worry. Having high cholesterol is just one of many things that can increase your risk of heart attack and stroke.  Other things that increase your risk include:   Smoking   High blood pressure   Having a parent or sibling who got heart disease at a young age - Young, in this case, means younger than 55 for males and younger than 65 for females.   A diet that is not heart healthy - A \"heart-healthy\" diet includes lots of fruits and vegetables, fiber, and healthy fats (like those found in fish, nuts, and certain oils). It also means limiting sugar and unhealthy fats.   Older age  If you are at high risk of heart attack and stroke, having high cholesterol is a problem. But if you are at low risk, high cholesterol might not need treatment.  Should I take medicine to lower cholesterol? -- Not everyone who has high cholesterol needs medicines. Your doctor or nurse will decide if you need them based on your age, family history, and other health concerns.  There are many different medicines used to lower cholesterol (table 2). Some help your body make less cholesterol. Some keep your body from absorbing cholesterol from foods. Some help your body get rid of cholesterol faster. The medicines most often used to treat high cholesterol are called \"statins.\"  You should probably take a statin if you:   Already had a heart attack or stroke   Have known heart disease   Have diabetes   Have a condition called \"peripheral artery disease,\" which makes it painful to walk, and happens when the arteries in your legs get clogged with fatty deposits   Have an \"abdominal aortic aneurysm,\" which is a widening of the main artery in the belly  Most people with any of the conditions listed above should take a statin no matter what their " "cholesterol level is. If your doctor or nurse prescribes a statin, it's important to keep taking it. The medicine might not make you feel any different. But it can help prevent heart attack, stroke, and death.  If your doctor or nurse recommends taking medicine to help lower your cholesterol, make sure that you know what it is called. Follow all the instructions for how to take it. For example, some medicines work better when you take them in the evening. Some need to be taken with food.  Tell your doctor or nurse if your medicine causes any side effects that bother you. They might be able to switch you to a different medicine.  Can I lower my cholesterol without medicines? -- Yes. You can help lower your cholesterol by doing these things:   You can lower your LDL, or \"bad,\" cholesterol by avoiding red meat, butter, fried foods, cheese, and other foods that have a lot of saturated fat.   You can lower triglycerides by avoiding sugary foods, fried foods, and excess alcohol.   If you have excess weight, it can help to lose weight. Your doctor or nurse can help you do this in a healthy way.   Try to get regular physical activity. Even gentle forms of exercise, like walking, are good for your health.  Even if these steps don't change your cholesterol very much, they can improve your health in many other ways.  All topics are updated as new evidence becomes available and our peer review process is complete.  This topic retrieved from [employer] on: Mar 01, 2024.  Topic 51693 Version 25.0  Release: 32.2.4 - C32.59  © 2024 UpToDate, Inc. and/or its affiliates. All rights reserved.  table 1: Cholesterol and triglyceride measurements in the [place] and elsewhere     Measurement used within the [place] Milligrams/deciliter (mg/dL)  Measurement used most places outside of the [place] Millimoles/liter (mmol/Liter)     Level to aim for  Level to aim for    Total cholesterol  Below 200 Below 5.17   LDL cholesterol  Below 130, or much lower if at " risk of heart attack and stroke Below 3.36, or much lower if at risk of heart attack and stroke   HDL cholesterol  Above 60 Above 1.55   Triglycerides  Below 150 Below 1.7   Cholesterol is measured differently in the [place] than it is in most other countries. This table shows values used within and outside of the [place]. It includes the cholesterol and triglyceride levels that most people who do not have heart disease should aim for.  Graphic 34143 Version 5.0  table 2: Lipid-lowering medicines  Generic name  Brand name    Statins    Atorvastatin Lipitor   Fluvastatin Lescol, Lescol XL   Lovastatin Mevacor, Altoprev   Pitavastatin Livalo   Pravastatin Pravachol   Rosuvastatin Crestor   Simvastatin Zocor   PCSK9 inhibitors    Alirocumab Praluent   Evolocumab Repatha, Repatha SureClick   Cholesterol absorption inhibitors    Ezetimibe Zetia   Bile acid sequestrants    Cholestyramine Prevalite, Questran, Questran Light   Colesevelam Welchol   Colestipol Colestid   Niacin (nicotinic acid)    Niacin immediate release     Niacin extended release Niaspan   Fibrates    Fenofibrate Fenoglide, Tricor, Triglide, others   Gemfibrozil Lopid   Brand names listed are for medicines available in the [place] and some other countries.  Graphic 50807 Version 7.0  Consumer Information Use and Disclaimer   Disclaimer: This generalized information is a limited summary of diagnosis, treatment, and/or medication information. It is not meant to be comprehensive and should be used as a tool to help the user understand and/or assess potential diagnostic and treatment options. It does NOT include all information about conditions, treatments, medications, side effects, or risks that may apply to a specific patient. It is not intended to be medical advice or a substitute for the medical advice, diagnosis, or treatment of a health care provider based on the health care provider's examination and assessment of a patient's specific and unique circumstances.  Patients must speak with a health care provider for complete information about their health, medical questions, and treatment options, including any risks or benefits regarding use of medications. This information does not endorse any treatments or medications as safe, effective, or approved for treating a specific patient. UpToDate, Inc. and its affiliates disclaim any warranty or liability relating to this information or the use thereof.The use of this information is governed by the Terms of Use, available at https://www.[employer].com/en/know/clinical-effectiveness-terms. 2024© UpToDate, Inc. and its affiliates and/or licensors. All rights reserved.  Copyright   © 2024 UpToDate, Inc. and/or its affiliates. All rights reserved.

## 2024-10-07 NOTE — ASSESSMENT & PLAN NOTE
Cholesterol is controlled and at goal. Continue statin.    Orders:    Lipid Panel with Direct LDL reflex; Future

## 2024-10-07 NOTE — ASSESSMENT & PLAN NOTE
Most recent A1c was 5.9 % on 9/20/2024. A1c has decreased. Continue diet and lifestyle.    Orders:    Hemoglobin A1C; Future

## 2024-10-07 NOTE — PROGRESS NOTES
Ambulatory Visit  Name: Renetta Barrett      : 1955      MRN: [id number]  Encounter Provider: John Martinez DO  Encounter Date: 10/7/2024   Encounter department: [facility] [place]    Assessment & Plan  Stage 3a chronic kidney disease (HCC)      Lab Units 24  0748 03/15/24  0947 23  0943   CREATININE mg/dL 1.02 0.99 1.15   EGFR ml/min/1.73sq m 56 58 49     Renal function is stable. Continue to stay well hydrated and avoid nephrotoxins. Low risk for future dialysis. No underlying HTN or DM2.    Orders:    Comprehensive metabolic panel; Future    CBC and differential; Future    Albumin / creatinine urine ratio; Future    Hypercholesterolemia    Cholesterol is controlled and at goal. Continue statin.    Orders:    Lipid Panel with Direct LDL reflex; Future    Prediabetes    Most recent A1c was 5.9 % on 2024. A1c has decreased. Continue diet and lifestyle.    Orders:    Hemoglobin A1C; Future         History of Present Illness     History of Present Illness  The patient presents for follow-up.    She experiences back pain at night, which she suspects may be related to her urinary habits as she wakes up 3 to 4 times during the night to urinate.    She reports intermittent head pain, distinct from a headache or migraine, and is concerned about its cause.    She experiences daily arm numbness and tingling, which can sometimes prevent her from moving her hand due to a sensation akin to needle pricks. Additionally, she reports itching in her arm and difficulty performing tasks such as wringing out a washcloth or using a spray deodorant with her left hand, which she identifies as her weaker hand.    She is considering a reduction in her vitamin D dosage from 20 mg to 10 mg, expressing a general aversion to medication.     Review of Systems   Constitutional: Negative.    Respiratory: Negative.     Cardiovascular: Negative.    Gastrointestinal: Negative.    Musculoskeletal:  Positive  "for back pain.   Neurological:  Positive for numbness.     Objective     /72 (BP Location: Left arm, Patient Position: Sitting, Cuff Size: Standard)   Pulse 80   Temp 98 °F (36.7 °C) (Tympanic)   Resp 18   Ht 5' 1\" (1.549 m)   Wt 71.6 kg (157 lb 12.8 oz)   SpO2 98%   BMI 29.82 kg/m²     Physical Exam  Constitutional:       General: She is not in acute distress.     Appearance: She is not ill-appearing.   Cardiovascular:      Rate and Rhythm: Normal rate and regular rhythm.      Heart sounds: No murmur heard.  Pulmonary:      Effort: Pulmonary effort is normal. No respiratory distress.      Breath sounds: No wheezing.   Abdominal:      General: Bowel sounds are normal. There is no distension.      Tenderness: There is no abdominal tenderness.   Musculoskeletal:      Right lower leg: No edema.      Left lower leg: No edema.   Neurological:      Mental Status: She is alert.       John Martinez, DO  "

## 2024-10-07 NOTE — ASSESSMENT & PLAN NOTE
Lab Units 09/20/24  0748 03/15/24  0947 08/14/23  0943   CREATININE mg/dL 1.02 0.99 1.15   EGFR ml/min/1.73sq m 56 58 49     Renal function is stable. Continue to stay well hydrated and avoid nephrotoxins. Low risk for future dialysis. No underlying HTN or DM2.    Orders:    Comprehensive metabolic panel; Future    CBC and differential; Future    Albumin / creatinine urine ratio; Future

## 2025-01-24 DIAGNOSIS — R05.1 ACUTE COUGH: Primary | ICD-10-CM

## 2025-01-24 RX ORDER — PROMETHAZINE HYDROCHLORIDE AND CODEINE PHOSPHATE 6.25; 1 MG/5ML; MG/5ML
5 SYRUP ORAL EVERY 4 HOURS PRN
Qty: 240 ML | Refills: 0 | Status: SHIPPED | OUTPATIENT
Start: 2025-01-24

## 2025-01-24 NOTE — TELEPHONE ENCOUNTER
Patient called the RX Refill Line. Message is being forwarded to the office.     Patient is requesting a refill of the phenergan with codeine, stated that since last weekend she's had a cough that is keeping her up at night  Pt stated no other symptoms, just the cough    She's asking if the cough medicine can be sent to the [facility] on [address]    Please contact patient at

## 2025-03-31 ENCOUNTER — APPOINTMENT (OUTPATIENT)
Age: 70
End: 2025-03-31
Payer: COMMERCIAL

## 2025-03-31 DIAGNOSIS — E78.00 HYPERCHOLESTEROLEMIA: Chronic | ICD-10-CM

## 2025-03-31 DIAGNOSIS — N18.31 STAGE 3A CHRONIC KIDNEY DISEASE (HCC): ICD-10-CM

## 2025-03-31 DIAGNOSIS — R73.03 PREDIABETES: Chronic | ICD-10-CM

## 2025-03-31 LAB
ALBUMIN SERPL BCG-MCNC: 4.2 G/DL (ref 3.5–5)
ALP SERPL-CCNC: 83 U/L (ref 34–104)
ALT SERPL W P-5'-P-CCNC: 22 U/L (ref 7–52)
ANION GAP SERPL CALCULATED.3IONS-SCNC: 8 MMOL/L (ref 4–13)
AST SERPL W P-5'-P-CCNC: 24 U/L (ref 13–39)
BASOPHILS # BLD AUTO: 0.03 THOUSANDS/ÂΜL (ref 0–0.1)
BASOPHILS NFR BLD AUTO: 1 % (ref 0–1)
BILIRUB SERPL-MCNC: 0.47 MG/DL (ref 0.2–1)
BUN SERPL-MCNC: 17 MG/DL (ref 5–25)
CALCIUM SERPL-MCNC: 9 MG/DL (ref 8.4–10.2)
CHLORIDE SERPL-SCNC: 108 MMOL/L (ref 96–108)
CHOLEST SERPL-MCNC: 149 MG/DL (ref ?–200)
CO2 SERPL-SCNC: 26 MMOL/L (ref 21–32)
CREAT SERPL-MCNC: 0.97 MG/DL (ref 0.6–1.3)
CREAT UR-MCNC: 174.5 MG/DL
EOSINOPHIL # BLD AUTO: 0.11 THOUSAND/ÂΜL (ref 0–0.61)
EOSINOPHIL NFR BLD AUTO: 2 % (ref 0–6)
ERYTHROCYTE [DISTWIDTH] IN BLOOD BY AUTOMATED COUNT: 13.2 % (ref 11.6–15.1)
EST. AVERAGE GLUCOSE BLD GHB EST-MCNC: 126 MG/DL
GFR SERPL CREATININE-BSD FRML MDRD: 59 ML/MIN/1.73SQ M
GLUCOSE P FAST SERPL-MCNC: 91 MG/DL (ref 65–99)
HBA1C MFR BLD: 6 %
HCT VFR BLD AUTO: 37.6 % (ref 34.8–46.1)
HDLC SERPL-MCNC: 67 MG/DL
HGB BLD-MCNC: 12.5 G/DL (ref 11.5–15.4)
IMM GRANULOCYTES # BLD AUTO: 0.02 THOUSAND/UL (ref 0–0.2)
IMM GRANULOCYTES NFR BLD AUTO: 0 % (ref 0–2)
LDLC SERPL CALC-MCNC: 70 MG/DL (ref 0–100)
LYMPHOCYTES # BLD AUTO: 1.62 THOUSANDS/ÂΜL (ref 0.6–4.47)
LYMPHOCYTES NFR BLD AUTO: 30 % (ref 14–44)
MCH RBC QN AUTO: 30.9 PG (ref 26.8–34.3)
MCHC RBC AUTO-ENTMCNC: 33.2 G/DL (ref 31.4–37.4)
MCV RBC AUTO: 93 FL (ref 82–98)
MICROALBUMIN UR-MCNC: 7.6 MG/L
MICROALBUMIN/CREAT 24H UR: 4 MG/G CREATININE (ref 0–30)
MONOCYTES # BLD AUTO: 0.65 THOUSAND/ÂΜL (ref 0.17–1.22)
MONOCYTES NFR BLD AUTO: 12 % (ref 4–12)
NEUTROPHILS # BLD AUTO: 2.91 THOUSANDS/ÂΜL (ref 1.85–7.62)
NEUTS SEG NFR BLD AUTO: 55 % (ref 43–75)
NRBC BLD AUTO-RTO: 0 /100 WBCS
PLATELET # BLD AUTO: 325 THOUSANDS/UL (ref 149–390)
PMV BLD AUTO: 10 FL (ref 8.9–12.7)
POTASSIUM SERPL-SCNC: 4 MMOL/L (ref 3.5–5.3)
PROT SERPL-MCNC: 6.5 G/DL (ref 6.4–8.4)
RBC # BLD AUTO: 4.04 MILLION/UL (ref 3.81–5.12)
SODIUM SERPL-SCNC: 142 MMOL/L (ref 135–147)
TRIGL SERPL-MCNC: 58 MG/DL (ref ?–150)
WBC # BLD AUTO: 5.34 THOUSAND/UL (ref 4.31–10.16)

## 2025-03-31 PROCEDURE — 82043 UR ALBUMIN QUANTITATIVE: CPT

## 2025-03-31 PROCEDURE — 85025 COMPLETE CBC W/AUTO DIFF WBC: CPT

## 2025-03-31 PROCEDURE — 80061 LIPID PANEL: CPT

## 2025-03-31 PROCEDURE — 80053 COMPREHEN METABOLIC PANEL: CPT

## 2025-03-31 PROCEDURE — 82570 ASSAY OF URINE CREATININE: CPT

## 2025-03-31 PROCEDURE — 36415 COLL VENOUS BLD VENIPUNCTURE: CPT

## 2025-03-31 PROCEDURE — 83036 HEMOGLOBIN GLYCOSYLATED A1C: CPT

## 2025-04-14 ENCOUNTER — OFFICE VISIT (OUTPATIENT)
Age: 70
End: 2025-04-14
Payer: COMMERCIAL

## 2025-04-14 VITALS
DIASTOLIC BLOOD PRESSURE: 70 MMHG | HEART RATE: 84 BPM | TEMPERATURE: 94.9 F | SYSTOLIC BLOOD PRESSURE: 112 MMHG | BODY MASS INDEX: 28.89 KG/M2 | OXYGEN SATURATION: 99 % | WEIGHT: 153 LBS | HEIGHT: 61 IN

## 2025-04-14 DIAGNOSIS — Z00.00 MEDICARE ANNUAL WELLNESS VISIT, SUBSEQUENT: ICD-10-CM

## 2025-04-14 DIAGNOSIS — N18.31 STAGE 3A CHRONIC KIDNEY DISEASE (HCC): Primary | ICD-10-CM

## 2025-04-14 DIAGNOSIS — R73.03 PREDIABETES: Chronic | ICD-10-CM

## 2025-04-14 DIAGNOSIS — E78.00 HYPERCHOLESTEROLEMIA: Chronic | ICD-10-CM

## 2025-04-14 PROCEDURE — 99214 OFFICE O/P EST MOD 30 MIN: CPT | Performed by: INTERNAL MEDICINE

## 2025-04-14 PROCEDURE — G0537 PR RISK ASCVD TST ONCE PR 12 MO: HCPCS | Performed by: INTERNAL MEDICINE

## 2025-04-14 PROCEDURE — G2211 COMPLEX E/M VISIT ADD ON: HCPCS | Performed by: INTERNAL MEDICINE

## 2025-04-14 PROCEDURE — 99397 PER PM REEVAL EST PAT 65+ YR: CPT | Performed by: INTERNAL MEDICINE

## 2025-04-14 PROCEDURE — G0439 PPPS, SUBSEQ VISIT: HCPCS | Performed by: INTERNAL MEDICINE

## 2025-04-14 NOTE — ASSESSMENT & PLAN NOTE
Lab Results   Component Value Date    EGFR 59 03/31/2025    EGFR 56 09/20/2024    EGFR 58 03/15/2024    CREATININE 0.97 03/31/2025    CREATININE 1.02 09/20/2024    CREATININE 0.99 03/15/2024     Renal function is stable. Stay well hydrated and avoid nephrotoxins. Blood pressure is controlled.    Orders:  •  CBC; Future  •  Basic metabolic panel; Future  •  Lipid Panel with Direct LDL reflex; Future

## 2025-04-14 NOTE — PATIENT INSTRUCTIONS
Medicare Preventive Visit Patient Instructions  Thank you for completing your Welcome to Medicare Visit or Medicare Annual Wellness Visit today. Your next wellness visit will be due in one year (4/15/2026).  The screening/preventive services that you may require over the next 5-10 years are detailed below. Some tests may not apply to you based off risk factors and/or age. Screening tests ordered at today's visit but not completed yet may show as past due. Also, please note that scanned in results may not display below.  Preventive Screenings:  Service Recommendations Previous Testing/Comments   Colorectal Cancer Screening  * Colonoscopy    * Fecal Occult Blood Test (FOBT)/Fecal Immunochemical Test (FIT)  * Fecal DNA/Cologuard Test  * Flexible Sigmoidoscopy Age: 45-75 years old   Colonoscopy: every 10 years (may be performed more frequently if at higher risk)  OR  FOBT/FIT: every 1 year  OR  Cologuard: every 3 years  OR  Sigmoidoscopy: every 5 years  Screening may be recommended earlier than age 45 if at higher risk for colorectal cancer. Also, an individualized decision between you and your healthcare provider will decide whether screening between the ages of 76-85 would be appropriate. Colonoscopy: 05/07/2024  FOBT/FIT: Not on file  Cologuard: Not on file  Sigmoidoscopy: Not on file    Screening Current     Breast Cancer Screening Age: 40+ years old  Frequency: every 1-2 years  Not required if history of left and right mastectomy Mammogram: 06/24/2024    Screening Current   Cervical Cancer Screening Between the ages of 21-29, pap smear recommended once every 3 years.   Between the ages of 30-65, can perform pap smear with HPV co-testing every 5 years.   Recommendations may differ for women with a history of total hysterectomy, cervical cancer, or abnormal pap smears in past. Pap Smear: 06/27/2024    Screening Not Indicated   Hepatitis C Screening Once for adults born between 1945 and 1965  More frequently in  patients at high risk for Hepatitis C Hep C Antibody: 03/29/2021    Screening Current   Diabetes Screening 1-2 times per year if you're at risk for diabetes or have pre-diabetes Fasting glucose: 91 mg/dL (3/31/2025)  A1C: 6.0 % (3/31/2025)  Screening Current   Cholesterol Screening Once every 5 years if you don't have a lipid disorder. May order more often based on risk factors. Lipid panel: 03/31/2025    Screening Not Indicated  History Lipid Disorder     Other Preventive Screenings Covered by Medicare:  Abdominal Aortic Aneurysm (AAA) Screening: covered once if your at risk. You're considered to be at risk if you have a family history of AAA.  Lung Cancer Screening: covers low dose CT scan once per year if you meet all of the following conditions: (1) Age 55-77; (2) No signs or symptoms of lung cancer; (3) Current smoker or have quit smoking within the last 15 years; (4) You have a tobacco smoking history of at least 20 pack years (packs per day multiplied by number of years you smoked); (5) You get a written order from a healthcare provider.  Glaucoma Screening: covered annually if you're considered high risk: (1) You have diabetes OR (2) Family history of glaucoma OR (3)  aged 50 and older OR (4)  American aged 65 and older  Osteoporosis Screening: covered every 2 years if you meet one of the following conditions: (1) You're estrogen deficient and at risk for osteoporosis based off medical history and other findings; (2) Have a vertebral abnormality; (3) On glucocorticoid therapy for more than 3 months; (4) Have primary hyperparathyroidism; (5) On osteoporosis medications and need to assess response to drug therapy.   Last bone density test (DXA Scan): 05/20/2021.  HIV Screening: covered annually if you're between the age of 15-65. Also covered annually if you are younger than 15 and older than 65 with risk factors for HIV infection. For pregnant patients, it is covered up to 3 times per  pregnancy.    Immunizations:  Immunization Recommendations   Influenza Vaccine Annual influenza vaccination during flu season is recommended for all persons aged >= 6 months who do not have contraindications   Pneumococcal Vaccine   * Pneumococcal conjugate vaccine = PCV13 (Prevnar 13), PCV15 (Vaxneuvance), PCV20 (Prevnar 20)  * Pneumococcal polysaccharide vaccine = PPSV23 (Pneumovax) Adults 19-65 yo with certain risk factors or if 65+ yo  If never received any pneumonia vaccine: recommend Prevnar 20 (PCV20)  Give PCV20 if previously received 1 dose of PCV13 or PPSV23   Hepatitis B Vaccine 3 dose series if at intermediate or high risk (ex: diabetes, end stage renal disease, liver disease)   Respiratory syncytial virus (RSV) Vaccine - COVERED BY MEDICARE PART D  * RSVPreF3 (Arexvy) CDC recommends that adults 60 years of age and older may receive a single dose of RSV vaccine using shared clinical decision-making (SCDM)   Tetanus (Td) Vaccine - COST NOT COVERED BY MEDICARE PART B Following completion of primary series, a booster dose should be given every 10 years to maintain immunity against tetanus. Td may also be given as tetanus wound prophylaxis.   Tdap Vaccine - COST NOT COVERED BY MEDICARE PART B Recommended at least once for all adults. For pregnant patients, recommended with each pregnancy.   Shingles Vaccine (Shingrix) - COST NOT COVERED BY MEDICARE PART B  2 shot series recommended in those 19 years and older who have or will have weakened immune systems or those 50 years and older     Health Maintenance Due:      Topic Date Due    Breast Cancer Screening: Mammogram  06/24/2025    Colorectal Cancer Screening  05/06/2029    DXA SCAN  05/20/2031    Hepatitis C Screening  Completed     Immunizations Due:      Topic Date Due    Influenza Vaccine (1) 09/01/2024    COVID-19 Vaccine (4 - 2024-25 season) 09/01/2024     Advance Directives   What are advance directives?  Advance directives are legal documents that  state your wishes and plans for medical care. These plans are made ahead of time in case you lose your ability to make decisions for yourself. Advance directives can apply to any medical decision, such as the treatments you want, and if you want to donate organs.   What are the types of advance directives?  There are many types of advance directives, and each state has rules about how to use them. You may choose a combination of any of the following:  Living will:  This is a written record of the treatment you want. You can also choose which treatments you do not want, which to limit, and which to stop at a certain time. This includes surgery, medicine, IV fluid, and tube feedings.   Durable power of  for healthcare (DPAHC):  This is a written record that states who you want to make healthcare choices for you when you are unable to make them for yourself. This person, called a proxy, is usually a family member or a friend. You may choose more than 1 proxy.  Do not resuscitate (DNR) order:  A DNR order is used in case your heart stops beating or you stop breathing. It is a request not to have certain forms of treatment, such as CPR. A DNR order may be included in other types of advance directives.  Medical directive:  This covers the care that you want if you are in a coma, near death, or unable to make decisions for yourself. You can list the treatments you want for each condition. Treatment may include pain medicine, surgery, blood transfusions, dialysis, IV or tube feedings, and a ventilator (breathing machine).  Values history:  This document has questions about your views, beliefs, and how you feel and think about life. This information can help others choose the care that you would choose.  Why are advance directives important?  An advance directive helps you control your care. Although spoken wishes may be used, it is better to have your wishes written down. Spoken wishes can be misunderstood, or not  followed. Treatments may be given even if you do not want them. An advance directive may make it easier for your family to make difficult choices about your care.   Weight Management   Why it is important to manage your weight:  Being overweight increases your risk of health conditions such as heart disease, high blood pressure, type 2 diabetes, and certain types of cancer. It can also increase your risk for osteoarthritis, sleep apnea, and other respiratory problems. Aim for a slow, steady weight loss. Even a small amount of weight loss can lower your risk of health problems.  How to lose weight safely:  A safe and healthy way to lose weight is to eat fewer calories and get regular exercise. You can lose up about 1 pound a week by decreasing the number of calories you eat by 500 calories each day.   Healthy meal plan for weight management:  A healthy meal plan includes a variety of foods, contains fewer calories, and helps you stay healthy. A healthy meal plan includes the following:  Eat whole-grain foods more often.  A healthy meal plan should contain fiber. Fiber is the part of grains, fruits, and vegetables that is not broken down by your body. Whole-grain foods are healthy and provide extra fiber in your diet. Some examples of whole-grain foods are whole-wheat breads and pastas, oatmeal, brown rice, and bulgur.  Eat a variety of vegetables every day.  Include dark, leafy greens such as spinach, kale, natalie greens, and mustard greens. Eat yellow and orange vegetables such as carrots, sweet potatoes, and winter squash.   Eat a variety of fruits every day.  Choose fresh or canned fruit (canned in its own juice or light syrup) instead of juice. Fruit juice has very little or no fiber.  Eat low-fat dairy foods.  Drink fat-free (skim) milk or 1% milk. Eat fat-free yogurt and low-fat cottage cheese. Try low-fat cheeses such as mozzarella and other reduced-fat cheeses.  Choose meat and other protein foods that are  low in fat.  Choose beans or other legumes such as split peas or lentils. Choose fish, skinless poultry (chicken or turkey), or lean cuts of red meat (beef or pork). Before you cook meat or poultry, cut off any visible fat.   Use less fat and oil.  Try baking foods instead of frying them. Add less fat, such as margarine, sour cream, regular salad dressing and mayonnaise to foods. Eat fewer high-fat foods. Some examples of high-fat foods include french fries, doughnuts, ice cream, and cakes.  Eat fewer sweets.  Limit foods and drinks that are high in sugar. This includes candy, cookies, regular soda, and sweetened drinks.  Exercise:  Exercise at least 30 minutes per day on most days of the week. Some examples of exercise include walking, biking, dancing, and swimming. You can also fit in more physical activity by taking the stairs instead of the elevator or parking farther away from stores. Ask your healthcare provider about the best exercise plan for you.    © Copyright [employer] 2018 Information is for End User's use only and may not be sold, redistributed or otherwise used for commercial purposes. All illustrations and images included in CareNotes® are the copyrighted property of [employer] Inc. or [employer]

## 2025-04-14 NOTE — ASSESSMENT & PLAN NOTE
Most recent A1c was 6.0 % on 3/31/2025. Stable over the years. Discussed optimal protein intake. Discussed decreasing carbohydrate intake.    Orders:  •  Hemoglobin A1C; Future

## 2025-04-14 NOTE — PROGRESS NOTES
Name: Renetta Barrett      : 1955      MRN: [id number]  Encounter Provider: John Martinez DO  Encounter Date: 2025   Encounter department: [facility]  :  Assessment & Plan  Stage 3a chronic kidney disease (HCC)  Lab Results   Component Value Date    EGFR 59 2025    EGFR 56 2024    EGFR 58 03/15/2024    CREATININE 0.97 2025    CREATININE 1.02 2024    CREATININE 0.99 03/15/2024     Renal function is stable. Stay well hydrated and avoid nephrotoxins. Blood pressure is controlled.    Orders:  •  CBC; Future  •  Basic metabolic panel; Future  •  Lipid Panel with Direct LDL reflex; Future    Prediabetes    Most recent A1c was 6.0 % on 3/31/2025. Stable over the years. Discussed optimal protein intake. Discussed decreasing carbohydrate intake.    Orders:  •  Hemoglobin A1C; Future    Hypercholesterolemia    Stable and very well controlled. Continue statin therapy.    Medicare annual wellness visit, subsequent        Depression Screening and Follow-up Plan: Patient was screened for depression during today's encounter. They screened negative with a PHQ-2 score of 0.      Preventive health issues were discussed with patient, and age appropriate screening tests were ordered as noted in patient's After Visit Summary. Personalized health advice and appropriate referrals for health education or preventive services given if needed, as noted in patient's After Visit Summary.    History of Present Illness     Renetta presents for follow up with labs and medicare wellness visit       Patient Care Team:  John Martinez DO as PCP - General (Internal Medicine)  Ky Keenan MD as Endoscopist    Review of Systems   Constitutional: Negative.    Respiratory: Negative.     Cardiovascular: Negative.    Gastrointestinal: Negative.    Musculoskeletal:  Positive for neck pain. Negative for back pain and gait problem.   Neurological:  Positive for headaches.     Medical  History Reviewed by provider this encounter:  Tobacco  Allergies  Meds  Problems  Med Hx  Surg Hx  Fam Hx       Annual Wellness Visit Questionnaire   Renetta is here for her Subsequent Wellness visit. Last Medicare Wellness visit information reviewed, patient interviewed and updates made to the record today.      Health Risk Assessment:   Patient rates overall health as good. Patient feels that their physical health rating is same. Patient is satisfied with their life. Eyesight was rated as slightly worse. Hearing was rated as same. Patient feels that their emotional and mental health rating is same. Patients states they are never, rarely angry. Patient states they are sometimes unusually tired/fatigued. Pain experienced in the last 7 days has been some. Patient's pain rating has been 5/10. Patient states that she has experienced no weight loss or gain in last 6 months.     Depression Screening:   PHQ-2 Score: 0      Fall Risk Screening:   In the past year, patient has experienced: no history of falling in past year      Urinary Incontinence Screening:   Patient has not leaked urine accidently in the last six months.     Home Safety:  Patient does not have trouble with stairs inside or outside of their home. Patient has working smoke alarms and has working carbon monoxide detector. Home safety hazards include: none.     Nutrition:   Current diet is Regular.     Medications:   Patient is not currently taking any over-the-counter supplements. Patient is able to manage medications.     Activities of Daily Living (ADLs)/Instrumental Activities of Daily Living (IADLs):   Walk and transfer into and out of bed and chair?: Yes  Dress and groom yourself?: Yes    Bathe or shower yourself?: Yes    Feed yourself? Yes  Do your laundry/housekeeping?: Yes  Manage your money, pay your bills and track your expenses?: Yes  Make your own meals?: Yes    Do your own shopping?: Yes    Previous Hospitalizations:   Any  hospitalizations or ED visits within the last 12 months?: No      Advance Care Planning:   Living will: No    Durable POA for healthcare: No    Advanced directive: No    Five wishes given: No      Cognitive Screening:   Provider or family/friend/caregiver concerned regarding cognition?: No    Preventive Screenings      Cardiovascular Screening:    General: Screening Not Indicated and History Lipid Disorder      Diabetes Screening:     General: Screening Current      Colorectal Cancer Screening:     General: Screening Current      Breast Cancer Screening:     General: Screening Current      Cervical Cancer Screening:    General: Screening Not Indicated      Osteoporosis Screening:    General: Screening Current      Abdominal Aortic Aneurysm (AAA) Screening:        General: Screening Not Indicated      Lung Cancer Screening:     General: Screening Not Indicated      Hepatitis C Screening:    General: Screening Current    Immunizations:  - Immunizations due: Influenza    Cardiovascular Risk Assessment:  Patient does not have underlying ASCVD and their cardiovascular risk was assessed today. Their cardiovascular risk factors include: hyperlipidemia, overweight/obesity, pre-diabetes or diabetes and CKD/proteinuria.     The 10-year ASCVD risk score (Kierra MACE, et al., 2019) is: 7.1%    Values used to calculate the score:      Age: 70 years      Sex: Female      Is Non- : Yes      Diabetic: No      Tobacco smoker: No      Systolic Blood Pressure: 112 mmHg      Is BP treated: No      HDL Cholesterol: 67 mg/dL      Total Cholesterol: 149 mg/dL    Time spent assessing cardiovascular risk: 5 minutes.     Screening, Brief Intervention, and Referral to Treatment (SBIRT)     Screening  Typical number of drinks in a day: 0  Typical number of drinks in a week: 0  Interpretation: Low risk drinking behavior.    AUDIT-C Screenin) How often did you have a drink containing alcohol in the past year? never  2)  "How many drinks did you have on a typical day when you were drinking in the past year? 0  3) How often did you have 6 or more drinks on one occasion in the past year? never    AUDIT-C Score: 0  Interpretation: Score 0-2 (female): Negative screen for alcohol misuse    Single Item Drug Screening:  How often have you used an illegal drug (including marijuana) or a prescription medication for non-medical reasons in the past year? never    Single Item Drug Screen Score: 0  Interpretation: Negative screen for possible drug use disorder    Brief Intervention  Alcohol & drug use screenings were reviewed. No concerns regarding substance use disorder identified.     Other Counseling Topics:   Car/seat belt/driving safety, skin self-exam, sunscreen and regular weightbearing exercise.     Social Drivers of Health     Financial Resource Strain: Low Risk  (3/24/2023)    Overall Financial Resource Strain (CARDIA)    • Difficulty of Paying Living Expenses: Not hard at all   Food Insecurity: No Food Insecurity (4/14/2025)    Hunger Vital Sign    • Worried About Running Out of Food in the Last Year: Never true    • Ran Out of Food in the Last Year: Never true   Transportation Needs: No Transportation Needs (4/14/2025)    PRAPARE - Transportation    • Lack of Transportation (Medical): No    • Lack of Transportation (Non-Medical): No   Housing Stability: Low Risk  (4/14/2025)    Housing Stability Vital Sign    • Unable to Pay for Housing in the Last Year: No    • Number of Times Moved in the Last Year: 0    • Homeless in the Last Year: No   Utilities: Not At Risk (4/14/2025)    [facility] Utilities    • Threatened with loss of utilities: No     Objective   /70 (Patient Position: Sitting, Cuff Size: Standard)   Pulse 84   Temp (!) 94.9 °F (34.9 °C)   Ht 5' 1\" (1.549 m)   Wt 69.4 kg (153 lb)   SpO2 99%   BMI 28.91 kg/m²     Physical Exam  Constitutional:       General: She is not in acute distress.     Appearance: She is not " ill-appearing.   Cardiovascular:      Rate and Rhythm: Normal rate and regular rhythm.      Heart sounds: No murmur heard.  Pulmonary:      Effort: Pulmonary effort is normal. No respiratory distress.      Breath sounds: No wheezing.   Abdominal:      General: Bowel sounds are normal. There is no distension.      Tenderness: There is no abdominal tenderness.   Musculoskeletal:      Right lower leg: No edema.      Left lower leg: No edema.   Neurological:      Mental Status: She is alert.       [facility], DO

## 2025-06-12 DIAGNOSIS — E78.00 HYPERCHOLESTEROLEMIA: Chronic | ICD-10-CM

## 2025-06-13 RX ORDER — ATORVASTATIN CALCIUM 20 MG/1
20 TABLET, FILM COATED ORAL
Qty: 90 TABLET | Refills: 1 | Status: SHIPPED | OUTPATIENT
Start: 2025-06-13

## 2025-06-26 ENCOUNTER — HOSPITAL ENCOUNTER (OUTPATIENT)
Age: 70
Discharge: HOME | End: 2025-06-26
Payer: COMMERCIAL

## 2025-06-26 VITALS — HEIGHT: 61 IN | BODY MASS INDEX: 28.89 KG/M2 | WEIGHT: 153 LBS

## 2025-06-26 DIAGNOSIS — Z12.31 SCREENING MAMMOGRAM, ENCOUNTER FOR: ICD-10-CM

## 2025-06-26 PROCEDURE — 77063 BREAST TOMOSYNTHESIS BI: CPT

## 2025-06-26 PROCEDURE — 77067 SCR MAMMO BI INCL CAD: CPT

## 2025-06-30 ENCOUNTER — TELEPHONE (OUTPATIENT)
Dept: OBGYN CLINIC | Facility: CLINIC | Age: 70
End: 2025-06-30

## 2025-06-30 NOTE — TELEPHONE ENCOUNTER
Please let patient know she received a bill because she was seen in 2023 for yearly and 2024 for yearly.  Medicare only allows one visit every other year unless history personal of breast cancer endocervical cancal endometrial cancer abnormal pap history etc.    Todays visit would have most likely been covered as an annual.    Let patient know that we did indicate all this on her ABN and explained as well.

## 2025-06-30 NOTE — TELEPHONE ENCOUNTER
Patient was given chelsea number but she did receive a bill for 2024 annual.    She had annual scheduled for today we cancelled and rescheduled for 6/2026.          She did sign abn for 2024.    Please advise?

## 2025-07-03 NOTE — TELEPHONE ENCOUNTER
I spoke with patient and relayed the message from Jessica.        She says she has come every year and never had any issue until now and was told she could come in due to hpv and this being her underlying condition.            Please advise

## 2025-08-18 ENCOUNTER — OFFICE VISIT (OUTPATIENT)
Age: 70
End: 2025-08-18
Payer: COMMERCIAL

## 2025-08-18 ENCOUNTER — APPOINTMENT (OUTPATIENT)
Age: 70
End: 2025-08-18
Attending: STUDENT IN AN ORGANIZED HEALTH CARE EDUCATION/TRAINING PROGRAM
Payer: COMMERCIAL

## 2025-08-18 VITALS
SYSTOLIC BLOOD PRESSURE: 128 MMHG | HEIGHT: 61 IN | DIASTOLIC BLOOD PRESSURE: 80 MMHG | WEIGHT: 150 LBS | RESPIRATION RATE: 18 BRPM | HEART RATE: 78 BPM | OXYGEN SATURATION: 97 % | BODY MASS INDEX: 28.32 KG/M2 | TEMPERATURE: 97.9 F

## 2025-08-18 DIAGNOSIS — R22.1 LUMP IN NECK: Primary | ICD-10-CM

## 2025-08-18 DIAGNOSIS — R22.1 LUMP IN NECK: ICD-10-CM

## 2025-08-18 LAB
ANION GAP SERPL CALCULATED.3IONS-SCNC: 7 MMOL/L (ref 4–13)
BUN SERPL-MCNC: 12 MG/DL (ref 5–25)
CALCIUM SERPL-MCNC: 9.4 MG/DL (ref 8.4–10.2)
CHLORIDE SERPL-SCNC: 106 MMOL/L (ref 96–108)
CO2 SERPL-SCNC: 28 MMOL/L (ref 21–32)
CREAT SERPL-MCNC: 0.94 MG/DL (ref 0.6–1.3)
GFR SERPL CREATININE-BSD FRML MDRD: 61 ML/MIN/1.73SQ M
GLUCOSE SERPL-MCNC: 86 MG/DL (ref 65–140)
POTASSIUM SERPL-SCNC: 4.5 MMOL/L (ref 3.5–5.3)
SODIUM SERPL-SCNC: 141 MMOL/L (ref 135–147)

## 2025-08-18 PROCEDURE — G2211 COMPLEX E/M VISIT ADD ON: HCPCS | Performed by: STUDENT IN AN ORGANIZED HEALTH CARE EDUCATION/TRAINING PROGRAM

## 2025-08-18 PROCEDURE — 99213 OFFICE O/P EST LOW 20 MIN: CPT | Performed by: STUDENT IN AN ORGANIZED HEALTH CARE EDUCATION/TRAINING PROGRAM

## 2025-08-18 PROCEDURE — 36415 COLL VENOUS BLD VENIPUNCTURE: CPT

## 2025-08-18 PROCEDURE — 80048 BASIC METABOLIC PNL TOTAL CA: CPT

## 2025-08-19 ENCOUNTER — HOSPITAL ENCOUNTER (OUTPATIENT)
Dept: RADIOLOGY | Facility: MEDICAL CENTER | Age: 70
Discharge: HOME | End: 2025-08-19
Attending: STUDENT IN AN ORGANIZED HEALTH CARE EDUCATION/TRAINING PROGRAM
Payer: COMMERCIAL

## 2025-08-19 ENCOUNTER — RESULTS FOLLOW-UP (OUTPATIENT)
Age: 70
End: 2025-08-19

## 2025-08-19 ENCOUNTER — TELEPHONE (OUTPATIENT)
Age: 70
End: 2025-08-19

## 2025-08-19 DIAGNOSIS — Q89.2 THYROGLOSSAL DUCT CYST: Primary | ICD-10-CM

## 2025-08-19 DIAGNOSIS — R22.1 LUMP IN NECK: ICD-10-CM

## 2025-08-19 PROCEDURE — 70491 CT SOFT TISSUE NECK W/DYE: CPT

## 2025-08-19 RX ADMIN — IOHEXOL 85 ML: 350 INJECTION, SOLUTION INTRAVENOUS at 11:08
